# Patient Record
Sex: FEMALE | Race: WHITE | Employment: FULL TIME | URBAN - NONMETROPOLITAN AREA
[De-identification: names, ages, dates, MRNs, and addresses within clinical notes are randomized per-mention and may not be internally consistent; named-entity substitution may affect disease eponyms.]

---

## 2017-08-18 ENCOUNTER — APPOINTMENT (OUTPATIENT)
Dept: GENERAL RADIOLOGY | Age: 37
End: 2017-08-18
Payer: COMMERCIAL

## 2017-08-18 ENCOUNTER — HOSPITAL ENCOUNTER (EMERGENCY)
Age: 37
Discharge: HOME | End: 2017-08-18
Attending: EMERGENCY MEDICINE
Payer: COMMERCIAL

## 2017-08-18 VITALS
WEIGHT: 275 LBS | DIASTOLIC BLOOD PRESSURE: 100 MMHG | HEART RATE: 72 BPM | BODY MASS INDEX: 38.5 KG/M2 | HEIGHT: 71 IN | TEMPERATURE: 98.4 F | RESPIRATION RATE: 18 BRPM | OXYGEN SATURATION: 98 % | SYSTOLIC BLOOD PRESSURE: 160 MMHG

## 2017-08-18 DIAGNOSIS — S60.10XA SUBUNGUAL HEMATOMA OF DIGIT OF HAND, INITIAL ENCOUNTER: Primary | ICD-10-CM

## 2017-08-18 PROCEDURE — 11740 EVACUATION SUBUNGUAL HMTMA: CPT

## 2017-08-18 PROCEDURE — 99283 EMERGENCY DEPT VISIT LOW MDM: CPT

## 2017-08-18 PROCEDURE — 73140 X-RAY EXAM OF FINGER(S): CPT

## 2017-08-18 ASSESSMENT — PAIN DESCRIPTION - DESCRIPTORS: DESCRIPTORS: THROBBING

## 2017-08-18 ASSESSMENT — PAIN SCALES - GENERAL: PAINLEVEL_OUTOF10: 9

## 2017-08-18 ASSESSMENT — PAIN DESCRIPTION - ORIENTATION: ORIENTATION: RIGHT

## 2017-08-18 ASSESSMENT — PAIN DESCRIPTION - LOCATION: LOCATION: FINGER (COMMENT WHICH ONE)

## 2017-08-18 ASSESSMENT — PAIN DESCRIPTION - PAIN TYPE: TYPE: ACUTE PAIN

## 2017-08-18 ASSESSMENT — PAIN DESCRIPTION - ONSET: ONSET: SUDDEN

## 2018-01-30 ENCOUNTER — APPOINTMENT (OUTPATIENT)
Dept: GENERAL RADIOLOGY | Age: 38
End: 2018-01-30
Payer: COMMERCIAL

## 2018-01-30 ENCOUNTER — HOSPITAL ENCOUNTER (EMERGENCY)
Age: 38
Discharge: HOME | End: 2018-01-30
Attending: EMERGENCY MEDICINE
Payer: COMMERCIAL

## 2018-01-30 VITALS
HEIGHT: 71 IN | HEART RATE: 84 BPM | SYSTOLIC BLOOD PRESSURE: 172 MMHG | DIASTOLIC BLOOD PRESSURE: 91 MMHG | BODY MASS INDEX: 38.5 KG/M2 | RESPIRATION RATE: 20 BRPM | OXYGEN SATURATION: 100 % | TEMPERATURE: 98.1 F | WEIGHT: 275 LBS

## 2018-01-30 DIAGNOSIS — J10.1 INFLUENZA B: Primary | ICD-10-CM

## 2018-01-30 LAB
ALBUMIN SERPL-MCNC: 4 G/DL (ref 3.5–5.1)
ALP BLD-CCNC: 80 U/L (ref 38–126)
ALT SERPL-CCNC: 24 U/L (ref 11–66)
ANION GAP SERPL CALCULATED.3IONS-SCNC: 18 MEQ/L (ref 8–16)
AST SERPL-CCNC: 26 U/L (ref 5–40)
BASOPHILS # BLD: 0.3 %
BASOPHILS ABSOLUTE: 0 THOU/MM3 (ref 0–0.1)
BILIRUB SERPL-MCNC: 0.4 MG/DL (ref 0.3–1.2)
BILIRUBIN DIRECT: < 0.2 MG/DL (ref 0–0.3)
BUN BLDV-MCNC: 14 MG/DL (ref 7–22)
CALCIUM SERPL-MCNC: 9.3 MG/DL (ref 8.5–10.5)
CHLORIDE BLD-SCNC: 92 MEQ/L (ref 98–111)
CO2: 24 MEQ/L (ref 23–33)
CREAT SERPL-MCNC: 0.8 MG/DL (ref 0.4–1.2)
D-DIMER QUANTITATIVE: 458 NG/ML FEU (ref 0–500)
EKG ATRIAL RATE: 84 BPM
EKG P AXIS: 49 DEGREES
EKG P-R INTERVAL: 160 MS
EKG Q-T INTERVAL: 358 MS
EKG QRS DURATION: 82 MS
EKG QTC CALCULATION (BAZETT): 423 MS
EKG R AXIS: -10 DEGREES
EKG T AXIS: 38 DEGREES
EKG VENTRICULAR RATE: 84 BPM
EOSINOPHIL # BLD: 0.7 %
EOSINOPHILS ABSOLUTE: 0 THOU/MM3 (ref 0–0.4)
FLU A ANTIGEN: NEGATIVE
FLU B ANTIGEN: POSITIVE
GFR SERPL CREATININE-BSD FRML MDRD: 80 ML/MIN/1.73M2
GLUCOSE BLD-MCNC: 101 MG/DL (ref 70–108)
HCT VFR BLD CALC: 42.9 % (ref 37–47)
HEMOGLOBIN: 13.9 GM/DL (ref 12–16)
HYPOCHROMIA: ABNORMAL
LIPASE: 41.2 U/L (ref 5.6–51.3)
LYMPHOCYTES # BLD: 16.9 %
LYMPHOCYTES ABSOLUTE: 0.8 THOU/MM3 (ref 1–4.8)
MAGNESIUM: 2 MG/DL (ref 1.6–2.4)
MCH RBC QN AUTO: 26.6 PG (ref 27–31)
MCHC RBC AUTO-ENTMCNC: 32.3 GM/DL (ref 33–37)
MCV RBC AUTO: 82.4 FL (ref 81–99)
MONOCYTES # BLD: 9.6 %
MONOCYTES ABSOLUTE: 0.4 THOU/MM3 (ref 0.4–1.3)
NUCLEATED RED BLOOD CELLS: 0 /100 WBC
OSMOLALITY CALCULATION: 268.9 MOSMOL/KG (ref 275–300)
PDW BLD-RTO: 13.5 % (ref 11.5–14.5)
PLATELET # BLD: 135 THOU/MM3 (ref 130–400)
PMV BLD AUTO: 10.9 MCM (ref 7.4–10.4)
POTASSIUM SERPL-SCNC: 3.8 MEQ/L (ref 3.5–5.2)
RBC # BLD: 5.21 MILL/MM3 (ref 4.2–5.4)
SEG NEUTROPHILS: 72.5 %
SEGMENTED NEUTROPHILS ABSOLUTE COUNT: 3.3 THOU/MM3 (ref 1.8–7.7)
SODIUM BLD-SCNC: 134 MEQ/L (ref 135–145)
TOTAL PROTEIN: 7.3 G/DL (ref 6.1–8)
TROPONIN T: < 0.01 NG/ML
WBC # BLD: 4.5 THOU/MM3 (ref 4.8–10.8)

## 2018-01-30 PROCEDURE — 93000 ELECTROCARDIOGRAM COMPLETE: CPT | Performed by: EMERGENCY MEDICINE

## 2018-01-30 PROCEDURE — 82248 BILIRUBIN DIRECT: CPT

## 2018-01-30 PROCEDURE — 87804 INFLUENZA ASSAY W/OPTIC: CPT

## 2018-01-30 PROCEDURE — 36415 COLL VENOUS BLD VENIPUNCTURE: CPT

## 2018-01-30 PROCEDURE — 2580000003 HC RX 258: Performed by: EMERGENCY MEDICINE

## 2018-01-30 PROCEDURE — 99284 EMERGENCY DEPT VISIT MOD MDM: CPT

## 2018-01-30 PROCEDURE — 6370000000 HC RX 637 (ALT 250 FOR IP): Performed by: EMERGENCY MEDICINE

## 2018-01-30 PROCEDURE — 80053 COMPREHEN METABOLIC PANEL: CPT

## 2018-01-30 PROCEDURE — 83690 ASSAY OF LIPASE: CPT

## 2018-01-30 PROCEDURE — 85025 COMPLETE CBC W/AUTO DIFF WBC: CPT

## 2018-01-30 PROCEDURE — 83735 ASSAY OF MAGNESIUM: CPT

## 2018-01-30 PROCEDURE — 85379 FIBRIN DEGRADATION QUANT: CPT

## 2018-01-30 PROCEDURE — 71046 X-RAY EXAM CHEST 2 VIEWS: CPT

## 2018-01-30 PROCEDURE — 93005 ELECTROCARDIOGRAM TRACING: CPT

## 2018-01-30 PROCEDURE — 84484 ASSAY OF TROPONIN QUANT: CPT

## 2018-01-30 RX ORDER — GUAIFENESIN/DEXTROMETHORPHAN 100-10MG/5
5 SYRUP ORAL 3 TIMES DAILY PRN
Qty: 120 ML | Refills: 0 | Status: SHIPPED | OUTPATIENT
Start: 2018-01-30 | End: 2018-02-09

## 2018-01-30 RX ORDER — 0.9 % SODIUM CHLORIDE 0.9 %
1000 INTRAVENOUS SOLUTION INTRAVENOUS ONCE
Status: COMPLETED | OUTPATIENT
Start: 2018-01-30 | End: 2018-01-30

## 2018-01-30 RX ORDER — IBUPROFEN 400 MG/1
400 TABLET ORAL EVERY 6 HOURS PRN
COMMUNITY
End: 2018-03-05 | Stop reason: SDUPTHER

## 2018-01-30 RX ORDER — OSELTAMIVIR PHOSPHATE 75 MG/1
75 CAPSULE ORAL ONCE
Status: COMPLETED | OUTPATIENT
Start: 2018-01-30 | End: 2018-01-30

## 2018-01-30 RX ORDER — OSELTAMIVIR PHOSPHATE 75 MG/1
75 CAPSULE ORAL 2 TIMES DAILY
Qty: 10 CAPSULE | Refills: 0 | Status: SHIPPED | OUTPATIENT
Start: 2018-01-30 | End: 2018-02-04

## 2018-01-30 RX ADMIN — SODIUM CHLORIDE 1000 ML: 9 INJECTION, SOLUTION INTRAVENOUS at 03:17

## 2018-01-30 RX ADMIN — OSELTAMIVIR PHOSPHATE 75 MG: 75 CAPSULE ORAL at 04:56

## 2018-01-30 ASSESSMENT — ENCOUNTER SYMPTOMS
VOICE CHANGE: 0
PHOTOPHOBIA: 0
CHOKING: 0
WHEEZING: 0
ABDOMINAL PAIN: 0
NAUSEA: 0
VOMITING: 0
SINUS PRESSURE: 0
SORE THROAT: 1
TROUBLE SWALLOWING: 0
EYE PAIN: 0
CONSTIPATION: 0
BLOOD IN STOOL: 0
SHORTNESS OF BREATH: 0
EYE ITCHING: 0
BACK PAIN: 0
EYE REDNESS: 0
COUGH: 1
ABDOMINAL DISTENTION: 0
DIARRHEA: 1
CHEST TIGHTNESS: 0
RHINORRHEA: 0
EYE DISCHARGE: 0

## 2018-01-30 ASSESSMENT — PAIN DESCRIPTION - FREQUENCY: FREQUENCY: INTERMITTENT

## 2018-01-30 ASSESSMENT — PAIN SCALES - GENERAL
PAINLEVEL_OUTOF10: 8
PAINLEVEL_OUTOF10: 8

## 2018-01-30 ASSESSMENT — PAIN DESCRIPTION - ONSET: ONSET: GRADUAL

## 2018-01-30 ASSESSMENT — PAIN DESCRIPTION - PROGRESSION: CLINICAL_PROGRESSION: GRADUALLY WORSENING

## 2018-01-30 ASSESSMENT — PAIN DESCRIPTION - LOCATION: LOCATION: THROAT;CHEST

## 2018-01-30 ASSESSMENT — PAIN DESCRIPTION - PAIN TYPE
TYPE: ACUTE PAIN
TYPE: ACUTE PAIN

## 2018-01-30 ASSESSMENT — PAIN DESCRIPTION - DESCRIPTORS: DESCRIPTORS: SHARP

## 2018-01-30 NOTE — ED PROVIDER NOTES
[facility]  eMERGENCY dEPARTMENT eNCOUnter          CHIEF COMPLAINT       Chief Complaint   Patient presents with    Chest Pain    Dizziness       Nurses Notes reviewed and I agree except as noted in the HPI. HISTORY OF PRESENT ILLNESS    Onesimo Estrada is a 42 y.o. female who presents to the Emergency Department for the evaluation of syncope last night. The patient also complains of dizziness, chest pain (only with cough), cough (unproductive), sore throat, fever (102 degrees 3 days ago), urinary frequency, and diarrhea. The patient denies shortness of breath, abdominal pain, or headache. She reports 3 days ago she developed a fever, then yesterday she notices a sore throat and unproductive cough. Tonight, she reports she was just getting out of the shower when she felt dizzy, had chest pain, and passed out. The patient reports that her boyfriend is also sick with similar symptoms. He recently had an EGD exam. She denies any significant past medical history. The patient denies any other complaints or concerns at this time. All questions were addressed. Primary Care Provider: Meredith Branch DO    The HPI was provided by the patient. REVIEW OF SYSTEMS     Review of Systems   Constitutional: Positive for fever (102 degrees 2 days ago). Negative for activity change, appetite change, diaphoresis, fatigue and unexpected weight change. HENT: Positive for sore throat. Negative for congestion, ear discharge, ear pain, hearing loss, rhinorrhea, sinus pressure, trouble swallowing and voice change. Eyes: Negative for photophobia, pain, discharge, redness and itching. Respiratory: Positive for cough (unproductive). Negative for choking, chest tightness, shortness of breath and wheezing. Cardiovascular: Positive for chest pain (only with cough). Negative for palpitations and leg swelling. Gastrointestinal: Positive for diarrhea.  Negative for abdominal distention, abdominal pain, other components within normal limits   ANION GAP - Abnormal; Notable for the following: Anion Gap 18.0 (*)     All other components within normal limits   GLOMERULAR FILTRATION RATE, ESTIMATED - Abnormal; Notable for the following:     Est, Glom Filt Rate 80 (*)     All other components within normal limits   OSMOLALITY - Abnormal; Notable for the following:     Osmolality Calc 268.9 (*)     All other components within normal limits   HEPATIC FUNCTION PANEL   LIPASE   TROPONIN   MAGNESIUM   D-DIMER, QUANTITATIVE       EMERGENCY DEPARTMENT COURSE:   Vitals:    Vitals:    01/30/18 0251 01/30/18 0351 01/30/18 0455   BP: (!) 122/94  (!) 172/91   Pulse: 93  84   Resp: 20 20 20   Temp: 98.1 °F (36.7 °C)     TempSrc: Oral     SpO2: 100% 100% 100%   Weight: 275 lb (124.7 kg)     Height: 5' 11\" (1.803 m)       2:58 AM: The patient was seen and evaluated. Appropriate labs were ordered and reviewed. At bedside, the patient was treated with a sodium chloride bolus. The patient is positive for influenza. A negative troponin and d-dimer were reassuring for rule out of ACS and PE. Her chest x-ray showed no acute findings, further ruling out ACS and Pe. Based on these findings,as well as their stable and non-emergent condition, I will discharge the patient. Patient has what appears to be influenza B. Patient has been given a prescription for Tamiflu and cough medication she is instructed to take it as prescribed. She is instructed to take Motrin and Tylenol for any fevers or pain. She is instructed to follow-up with her primary care physician and to do so within the next 1-2 days and to return to the emergency room immediately for any new or worsening complaints. CRITICAL CARE:   None    CONSULTS:  None    PROCEDURES:  None    FINAL IMPRESSION      1.  Influenza B          DISPOSITION/PLAN   Discharge    PATIENT REFERRED TO:  Selena Mann [address]  [address] [address] [phone]    Call in 2 days  As needed      DISCHARGE MEDICATIONS:  New Prescriptions    GUAIFENESIN-DEXTROMETHORPHAN (ROBITUSSIN DM) 100-10 MG/5ML SYRUP    Take 5 mLs by mouth 3 times daily as needed for Cough    OSELTAMIVIR (TAMIFLU) 75 MG CAPSULE    Take 1 capsule by mouth 2 times daily for 5 days       (Please note that portions of this note were completed with a voice recognition program.  Efforts were made to edit the dictations but occasionally words are mis-transcribed.)    Scribe: Kimberli Cardoso [facility] 1/30/18 2:58 AM Scribing for and in the presence of Carrie Hernandez DO. Scribe: Kimberli Cardoso [facility] 1/30/18 2:58 AM    Provider:  I personally performed the services described in the documentation, reviewed and edited the documentation which was dictated to the scribe in my presence, and it accurately records my words and actions.     Carrie Hernandez DO 1/30/18 5:00 AM        Carrie Hernandez DO  01/30/18 [id number]

## 2018-03-01 ENCOUNTER — OFFICE VISIT (OUTPATIENT)
Dept: FAMILY MEDICINE CLINIC | Age: 38
End: 2018-03-01
Payer: COMMERCIAL

## 2018-03-01 VITALS
HEIGHT: 69 IN | HEART RATE: 80 BPM | SYSTOLIC BLOOD PRESSURE: 124 MMHG | WEIGHT: 278 LBS | RESPIRATION RATE: 14 BRPM | DIASTOLIC BLOOD PRESSURE: 87 MMHG | TEMPERATURE: 98.4 F | BODY MASS INDEX: 41.18 KG/M2

## 2018-03-01 DIAGNOSIS — R22.1 NECK MASS: Primary | ICD-10-CM

## 2018-03-01 PROCEDURE — 99213 OFFICE O/P EST LOW 20 MIN: CPT | Performed by: FAMILY MEDICINE

## 2018-03-01 ASSESSMENT — PATIENT HEALTH QUESTIONNAIRE - PHQ9
SUM OF ALL RESPONSES TO PHQ QUESTIONS 1-9: 0
SUM OF ALL RESPONSES TO PHQ9 QUESTIONS 1 & 2: 0
1. LITTLE INTEREST OR PLEASURE IN DOING THINGS: 0
2. FEELING DOWN, DEPRESSED OR HOPELESS: 0

## 2018-03-02 ENCOUNTER — TELEPHONE (OUTPATIENT)
Dept: FAMILY MEDICINE CLINIC | Age: 38
End: 2018-03-02

## 2018-03-05 ENCOUNTER — HOSPITAL ENCOUNTER (EMERGENCY)
Age: 38
Discharge: HOME | End: 2018-03-05
Attending: EMERGENCY MEDICINE
Payer: COMMERCIAL

## 2018-03-05 ENCOUNTER — APPOINTMENT (OUTPATIENT)
Dept: CT IMAGING | Age: 38
End: 2018-03-05
Payer: COMMERCIAL

## 2018-03-05 VITALS
SYSTOLIC BLOOD PRESSURE: 162 MMHG | OXYGEN SATURATION: 100 % | BODY MASS INDEX: 39.87 KG/M2 | WEIGHT: 270 LBS | HEART RATE: 75 BPM | RESPIRATION RATE: 20 BRPM | TEMPERATURE: 97.8 F | DIASTOLIC BLOOD PRESSURE: 95 MMHG

## 2018-03-05 DIAGNOSIS — I88.0 MESENTERIC ADENITIS: ICD-10-CM

## 2018-03-05 DIAGNOSIS — R10.31 RLQ ABDOMINAL PAIN: Primary | ICD-10-CM

## 2018-03-05 LAB
ALBUMIN SERPL-MCNC: 3.9 G/DL (ref 3.5–5.1)
ALP BLD-CCNC: 86 U/L (ref 38–126)
ALT SERPL-CCNC: 16 U/L (ref 11–66)
ANION GAP SERPL CALCULATED.3IONS-SCNC: 15 MEQ/L (ref 8–16)
AST SERPL-CCNC: 14 U/L (ref 5–40)
BASOPHILS # BLD: 0.6 %
BASOPHILS ABSOLUTE: 0.1 THOU/MM3 (ref 0–0.1)
BILIRUB SERPL-MCNC: 0.2 MG/DL (ref 0.3–1.2)
BILIRUBIN DIRECT: < 0.2 MG/DL (ref 0–0.3)
BILIRUBIN URINE: NEGATIVE
BLOOD, URINE: NEGATIVE
BUN BLDV-MCNC: 19 MG/DL (ref 7–22)
C-REACTIVE PROTEIN: 1.31 MG/DL (ref 0–1)
CALCIUM SERPL-MCNC: 9.6 MG/DL (ref 8.5–10.5)
CHARACTER, URINE: CLEAR
CHLORIDE BLD-SCNC: 99 MEQ/L (ref 98–111)
CO2: 26 MEQ/L (ref 23–33)
COLOR: YELLOW
CREAT SERPL-MCNC: 0.7 MG/DL (ref 0.4–1.2)
EOSINOPHIL # BLD: 1.8 %
EOSINOPHILS ABSOLUTE: 0.2 THOU/MM3 (ref 0–0.4)
GFR SERPL CREATININE-BSD FRML MDRD: > 90 ML/MIN/1.73M2
GLUCOSE BLD-MCNC: 113 MG/DL (ref 70–108)
GLUCOSE URINE: NEGATIVE MG/DL
HCT VFR BLD CALC: 38.9 % (ref 37–47)
HEMOGLOBIN: 13.2 GM/DL (ref 12–16)
KETONES, URINE: NEGATIVE
LACTIC ACID: 1.8 MMOL/L (ref 0.5–2.2)
LEUKOCYTE ESTERASE, URINE: NEGATIVE
LIPASE: 51.2 U/L (ref 5.6–51.3)
LYMPHOCYTES # BLD: 26.6 %
LYMPHOCYTES ABSOLUTE: 3.2 THOU/MM3 (ref 1–4.8)
MAGNESIUM: 2.1 MG/DL (ref 1.6–2.4)
MCH RBC QN AUTO: 28.1 PG (ref 27–31)
MCHC RBC AUTO-ENTMCNC: 33.9 GM/DL (ref 33–37)
MCV RBC AUTO: 82.8 FL (ref 81–99)
MONOCYTES # BLD: 5.8 %
MONOCYTES ABSOLUTE: 0.7 THOU/MM3 (ref 0.4–1.3)
NITRITE, URINE: NEGATIVE
NUCLEATED RED BLOOD CELLS: 0 /100 WBC
OSMOLALITY CALCULATION: 282.5 MOSMOL/KG (ref 275–300)
PDW BLD-RTO: 13.6 % (ref 11.5–14.5)
PH UA: 5.5
PHOSPHORUS: 4.4 MG/DL (ref 2.4–4.7)
PLATELET # BLD: 186 THOU/MM3 (ref 130–400)
PMV BLD AUTO: 10.2 FL (ref 7.4–10.4)
POTASSIUM SERPL-SCNC: 4.4 MEQ/L (ref 3.5–5.2)
PROTEIN UA: NEGATIVE
RBC # BLD: 4.69 MILL/MM3 (ref 4.2–5.4)
SEG NEUTROPHILS: 65.2 %
SEGMENTED NEUTROPHILS ABSOLUTE COUNT: 7.9 THOU/MM3 (ref 1.8–7.7)
SODIUM BLD-SCNC: 140 MEQ/L (ref 135–145)
SPECIFIC GRAVITY, URINE: 1.02 (ref 1–1.03)
TOTAL PROTEIN: 6.9 G/DL (ref 6.1–8)
UROBILINOGEN, URINE: 0.2 EU/DL
WBC # BLD: 12.1 THOU/MM3 (ref 4.8–10.8)

## 2018-03-05 PROCEDURE — 96376 TX/PRO/DX INJ SAME DRUG ADON: CPT

## 2018-03-05 PROCEDURE — 85025 COMPLETE CBC W/AUTO DIFF WBC: CPT

## 2018-03-05 PROCEDURE — 83690 ASSAY OF LIPASE: CPT

## 2018-03-05 PROCEDURE — 6360000004 HC RX CONTRAST MEDICATION: Performed by: PHYSICIAN ASSISTANT

## 2018-03-05 PROCEDURE — 86140 C-REACTIVE PROTEIN: CPT

## 2018-03-05 PROCEDURE — 99284 EMERGENCY DEPT VISIT MOD MDM: CPT

## 2018-03-05 PROCEDURE — 96374 THER/PROPH/DIAG INJ IV PUSH: CPT

## 2018-03-05 PROCEDURE — 83735 ASSAY OF MAGNESIUM: CPT

## 2018-03-05 PROCEDURE — 6360000002 HC RX W HCPCS: Performed by: PHYSICIAN ASSISTANT

## 2018-03-05 PROCEDURE — 36415 COLL VENOUS BLD VENIPUNCTURE: CPT

## 2018-03-05 PROCEDURE — 80053 COMPREHEN METABOLIC PANEL: CPT

## 2018-03-05 PROCEDURE — 96375 TX/PRO/DX INJ NEW DRUG ADDON: CPT

## 2018-03-05 PROCEDURE — 81003 URINALYSIS AUTO W/O SCOPE: CPT

## 2018-03-05 PROCEDURE — 74177 CT ABD & PELVIS W/CONTRAST: CPT

## 2018-03-05 PROCEDURE — 84100 ASSAY OF PHOSPHORUS: CPT

## 2018-03-05 PROCEDURE — 82248 BILIRUBIN DIRECT: CPT

## 2018-03-05 PROCEDURE — 83605 ASSAY OF LACTIC ACID: CPT

## 2018-03-05 RX ORDER — IBUPROFEN 600 MG/1
600 TABLET ORAL EVERY 6 HOURS PRN
Qty: 30 TABLET | Refills: 0 | Status: SHIPPED | OUTPATIENT
Start: 2018-03-05 | End: 2019-10-28

## 2018-03-05 RX ORDER — MORPHINE SULFATE 2 MG/ML
2 INJECTION, SOLUTION INTRAMUSCULAR; INTRAVENOUS ONCE
Status: COMPLETED | OUTPATIENT
Start: 2018-03-05 | End: 2018-03-05

## 2018-03-05 RX ORDER — ONDANSETRON 4 MG/1
4 TABLET, ORALLY DISINTEGRATING ORAL EVERY 8 HOURS PRN
Qty: 20 TABLET | Refills: 0 | Status: SHIPPED | OUTPATIENT
Start: 2018-03-05 | End: 2018-03-29

## 2018-03-05 RX ORDER — ONDANSETRON 2 MG/ML
4 INJECTION INTRAMUSCULAR; INTRAVENOUS ONCE
Status: COMPLETED | OUTPATIENT
Start: 2018-03-05 | End: 2018-03-05

## 2018-03-05 RX ORDER — DICYCLOMINE HYDROCHLORIDE 10 MG/1
10 CAPSULE ORAL EVERY 6 HOURS PRN
Qty: 20 CAPSULE | Refills: 0 | Status: SHIPPED | OUTPATIENT
Start: 2018-03-05 | End: 2018-03-29

## 2018-03-05 RX ADMIN — MORPHINE SULFATE 2 MG: 2 INJECTION, SOLUTION INTRAMUSCULAR; INTRAVENOUS at 21:08

## 2018-03-05 RX ADMIN — ONDANSETRON 4 MG: 2 INJECTION INTRAMUSCULAR; INTRAVENOUS at 21:08

## 2018-03-05 RX ADMIN — MORPHINE SULFATE 2 MG: 2 INJECTION, SOLUTION INTRAMUSCULAR; INTRAVENOUS at 23:15

## 2018-03-05 RX ADMIN — IOPAMIDOL 80 ML: 755 INJECTION, SOLUTION INTRAVENOUS at 22:51

## 2018-03-05 ASSESSMENT — PAIN DESCRIPTION - LOCATION: LOCATION: ABDOMEN

## 2018-03-05 ASSESSMENT — ENCOUNTER SYMPTOMS
COLOR CHANGE: 0
BACK PAIN: 0
EYE REDNESS: 0
VOMITING: 0
RHINORRHEA: 0
SORE THROAT: 0
ABDOMINAL PAIN: 1
WHEEZING: 0
DIARRHEA: 0
NAUSEA: 1
SHORTNESS OF BREATH: 0
CONSTIPATION: 0
EYE DISCHARGE: 0

## 2018-03-05 ASSESSMENT — PAIN DESCRIPTION - FREQUENCY: FREQUENCY: CONTINUOUS

## 2018-03-05 ASSESSMENT — PAIN SCALES - GENERAL
PAINLEVEL_OUTOF10: 8
PAINLEVEL_OUTOF10: 7
PAINLEVEL_OUTOF10: 6

## 2018-03-05 ASSESSMENT — PAIN DESCRIPTION - PAIN TYPE: TYPE: ACUTE PAIN

## 2018-03-05 ASSESSMENT — PAIN DESCRIPTION - ORIENTATION: ORIENTATION: RIGHT;LOWER

## 2018-03-05 ASSESSMENT — PAIN DESCRIPTION - ONSET: ONSET: ON-GOING

## 2018-03-05 ASSESSMENT — PAIN DESCRIPTION - DESCRIPTORS: DESCRIPTORS: SHARP

## 2018-03-06 ENCOUNTER — OFFICE VISIT (OUTPATIENT)
Dept: SURGERY | Age: 38
End: 2018-03-06
Payer: COMMERCIAL

## 2018-03-06 VITALS
BODY MASS INDEX: 39.2 KG/M2 | OXYGEN SATURATION: 97 % | WEIGHT: 280 LBS | HEIGHT: 71 IN | HEART RATE: 90 BPM | DIASTOLIC BLOOD PRESSURE: 84 MMHG | TEMPERATURE: 97.7 F | RESPIRATION RATE: 18 BRPM | SYSTOLIC BLOOD PRESSURE: 120 MMHG

## 2018-03-06 DIAGNOSIS — R10.31 RIGHT LOWER QUADRANT ABDOMINAL PAIN: Primary | ICD-10-CM

## 2018-03-06 PROCEDURE — 99204 OFFICE O/P NEW MOD 45 MIN: CPT | Performed by: SURGERY

## 2018-03-06 ASSESSMENT — ENCOUNTER SYMPTOMS
COUGH: 0
BLOOD IN STOOL: 0

## 2018-03-06 NOTE — ED PROVIDER NOTES
patient's vital signs to be within acceptable range, and she was afebrile. Patient was hypertensive during her stay, but this was believed to be secondary to pain. She will need to follow up with her PCP for further BP monitoring. On exam, I appreciated right lower quadrant abdominal tenderness in the McBurney's point region without guarding, rigidity, rebound tenderness. Mildly positive obturator, iliopsoas, and Rovsing signs. Radiologic studies within the department revealed possible mild right lower quadrant mesenteric adenitis but a normal appendix. Also noted was a mildly dilated stool filled distal terminal ileum, likely due to bowel stasis. Also noted were bilateral adrenal lesions consistent with probable adenomas, for which the radiologist recommends follow-up CT in 6-12 months or adrenal MRI. These findings were discussed with the patient. Laboratory work revealed a white blood cell count of 12.1, CRP of 1.31, and lactic acid of 1.8. Within the department, the patient was treated with Morphine and Zofran. I observed the patient's condition to improve during the duration of the stay. I explained my proposed course of treatment to the patient, who was amenable to my treatment and discharge decisions. She was discharged home in stable condition with prescriptions for Ibuprofen, Bentyl, and Zofran, and the patient will return to the ED if the symptoms become more severe in nature or otherwise change. The patient agreed to call Dr. Michelle Goode office tomorrow morning to schedule a follow-up appointment tomorrow. I estimate there is LOW risk for ACUTE APPENDICITIS, BOWEL OBSTRUCTION, CHOLECYSTITIS, DIVERTICULITIS, INCARCERATED HERNIA, PANCREATITIS, or PERFORATED BOWEL or ULCER, thus I consider the discharge disposition reasonable. Also, there is no evidence of peritonitis, sepsis, or toxicity.  The patient and I have discussed the diagnosis and risks, and we agree with discharging home to follow-up with mis-transcribed.)    Scribe:  Julissa Wilburn 3/5/18 8:53 PM Scribing for and in the presence of Benji Joshua PA-C. Signed by: Juan Morales, 3/5/18 12:06 AM    Provider:  I personally performed the services described in the documentation, reviewed and edited the documentation which was dictated to the scribe in my presence, and it accurately records my words and actions.     Benji Joshua PA-C 03/06/18 12:06 AM    DIANDRA Gaviria PA-C  03/06/18 0005       Benji Joshua PA-C  03/06/18 [id number]

## 2018-03-07 ENCOUNTER — OFFICE VISIT (OUTPATIENT)
Dept: FAMILY MEDICINE CLINIC | Age: 38
End: 2018-03-07
Payer: COMMERCIAL

## 2018-03-07 VITALS
SYSTOLIC BLOOD PRESSURE: 126 MMHG | BODY MASS INDEX: 39.48 KG/M2 | RESPIRATION RATE: 12 BRPM | DIASTOLIC BLOOD PRESSURE: 74 MMHG | HEART RATE: 64 BPM | WEIGHT: 282 LBS | HEIGHT: 71 IN | TEMPERATURE: 97.9 F

## 2018-03-07 DIAGNOSIS — D35.00 ADRENAL ADENOMA, UNSPECIFIED LATERALITY: ICD-10-CM

## 2018-03-07 DIAGNOSIS — R10.31 RLQ ABDOMINAL PAIN: Primary | ICD-10-CM

## 2018-03-07 PROCEDURE — 99213 OFFICE O/P EST LOW 20 MIN: CPT | Performed by: FAMILY MEDICINE

## 2018-03-07 NOTE — PROGRESS NOTES
Visit Information    Have you changed or started any medications since your last visit including any over-the-counter medicines, vitamins, or herbal medicines? yes - see med list    Are you having any side effects from any of your medications? -  no  Have you stopped taking any of your medications? Is so, why? -  yes - see med list     Have you seen any other physician or provider since your last visit? Yes - Records Obtained  Have you had any other diagnostic tests since your last visit? Yes - Records Obtained  Have you been seen in the emergency room and/or had an admission to a hospital since we last saw you? Yes - Records Obtained  Have you had your routine dental cleaning in the past 6 months? no    Have you activated your [employer] account? If not, what are your barriers?  No: pt declined      Patient Care Team:  Cassidy Nguyen DO as PCP - General (Family Medicine)    Medical History Review  Past Medical, Family, and Social History reviewed and does contribute to the patient presenting condition    Health Maintenance   Topic Date Due    HIV screen  04/12/1993    DTaP/Tdap/Td vaccine (1 - Tdap) 04/12/1997    Flu vaccine (1) 09/01/2017

## 2018-03-07 NOTE — PROGRESS NOTES
Chief Complaint   Patient presents with    Follow-up     RLQ pain, adrenal adenomas       History obtained from the patient. SUBJECTIVE:  Cole White is a 42 y.o.  that presents today for     RLQ pain: seen in ER over the weekend. Dx with mesenteric adenitis and sent home. Had f/u with gen surgeon yesterday, Dr. Lolita Carolina, recommending monitor sxs and if no better by tomorrow, he will take her back to the OR. Pain is the same today. Managed with motrin but not better. No fevers. Eating ok. Bowels moving well      Bilat adrenal adenomas: noted incidentally on CT. No flank pain. Age/Gender Health Maintenance    Lipid - age 37  DM Screen - age 37  Colon Cancer Screening - age 46  Lung Cancer Screening (Age 52 to [de-identified] with 30 pack year hx, current smoker or quit within past 15 years) - never smoker. Tetanus - UTD 2007  Influenza Vaccine - Candidate FALL 2018  Pneumonia Vaccine - age 70  Zostavax - age 59     Breast Cancer Screening - age 34  Cervical Cancer Screening - hyst for benign reasons  Osteoporosis Screening - age 59    Falls screening - n/a      Current Outpatient Prescriptions   Medication Sig Dispense Refill    ibuprofen (ADVIL;MOTRIN) 600 MG tablet Take 1 tablet by mouth every 6 hours as needed for Pain 30 tablet 0    dicyclomine (BENTYL) 10 MG capsule Take 1 capsule by mouth every 6 hours as needed (cramps) 20 capsule 0    ondansetron (ZOFRAN ODT) 4 MG disintegrating tablet Take 1 tablet by mouth every 8 hours as needed for Nausea 20 tablet 0    acetaminophen (TYLENOL) 500 MG tablet Take 1,000 mg by mouth every 6 hours as needed for Pain       No current facility-administered medications for this visit. No orders of the defined types were placed in this encounter. All medications reviewed and reconciled, including OTC and herbal medications. Updated list given to patient.        Patient Active Problem List   Diagnosis    Patellofemoral stress syndrome of right knee    return the ER. 2. Adrenal adenoma, bilateral    Will repeat CT in 6 months, in call-back cue. DISPOSITION    Return in 3 weeks (on 3/29/2018) for f/u neck swelling, sooner as needed. Nancy Mead released without restrictions. Future Appointments  Date Time Provider Junior Kim   3/9/2018 2:00 PM STR ULTRASOUND RM 2 STRZ US STR Radiolog   3/29/2018 3:40 PM Chiara Richards DO [address]    Patient given educational materials on: See Attached    Barriers to learning and self management: none    Discussed use, benefit, and side effects of prescribed medications. Barriers to medication compliance addressed. All patient questions answered. Pt voiced understanding.        Electronically signed by Chiara Richards DO on 3/7/2018 at 9:06 AM

## 2018-03-08 ENCOUNTER — TELEPHONE (OUTPATIENT)
Dept: SURGERY | Age: 38
End: 2018-03-08

## 2018-03-09 ENCOUNTER — HOSPITAL ENCOUNTER (OUTPATIENT)
Age: 38
Discharge: HOME | End: 2018-03-10
Attending: SURGERY | Admitting: SURGERY
Payer: COMMERCIAL

## 2018-03-09 ENCOUNTER — ANESTHESIA EVENT (OUTPATIENT)
Dept: OPERATING ROOM | Age: 38
End: 2018-03-09

## 2018-03-09 ENCOUNTER — ANESTHESIA (OUTPATIENT)
Dept: OPERATING ROOM | Age: 38
End: 2018-03-09

## 2018-03-09 VITALS
SYSTOLIC BLOOD PRESSURE: 156 MMHG | DIASTOLIC BLOOD PRESSURE: 93 MMHG | OXYGEN SATURATION: 100 % | TEMPERATURE: 98.6 F | RESPIRATION RATE: 11 BRPM

## 2018-03-09 DIAGNOSIS — Z98.890 S/P LAPAROSCOPY: Primary | ICD-10-CM

## 2018-03-09 PROCEDURE — 2580000003 HC RX 258

## 2018-03-09 PROCEDURE — 3700000001 HC ADD 15 MINUTES (ANESTHESIA): Performed by: SURGERY

## 2018-03-09 PROCEDURE — 7100000001 HC PACU RECOVERY - ADDTL 15 MIN: Performed by: SURGERY

## 2018-03-09 PROCEDURE — 6360000002 HC RX W HCPCS: Performed by: ANESTHESIOLOGY

## 2018-03-09 PROCEDURE — 96360 HYDRATION IV INFUSION INIT: CPT

## 2018-03-09 PROCEDURE — 96365 THER/PROPH/DIAG IV INF INIT: CPT

## 2018-03-09 PROCEDURE — 6360000002 HC RX W HCPCS: Performed by: SURGERY

## 2018-03-09 PROCEDURE — 7100000000 HC PACU RECOVERY - FIRST 15 MIN: Performed by: SURGERY

## 2018-03-09 PROCEDURE — 2700000000 HC OXYGEN THERAPY PER DAY

## 2018-03-09 PROCEDURE — 6370000000 HC RX 637 (ALT 250 FOR IP): Performed by: ANESTHESIOLOGY

## 2018-03-09 PROCEDURE — 2500000003 HC RX 250 WO HCPCS: Performed by: SURGERY

## 2018-03-09 PROCEDURE — 44970 LAPAROSCOPY APPENDECTOMY: CPT | Performed by: SURGERY

## 2018-03-09 PROCEDURE — 6360000002 HC RX W HCPCS: Performed by: NURSE ANESTHETIST, CERTIFIED REGISTERED

## 2018-03-09 PROCEDURE — 2500000003 HC RX 250 WO HCPCS: Performed by: ANESTHESIOLOGY

## 2018-03-09 PROCEDURE — 88304 TISSUE EXAM BY PATHOLOGIST: CPT

## 2018-03-09 PROCEDURE — 3600000014 HC SURGERY LEVEL 4 ADDTL 15MIN: Performed by: SURGERY

## 2018-03-09 PROCEDURE — 96374 THER/PROPH/DIAG INJ IV PUSH: CPT

## 2018-03-09 PROCEDURE — 2500000003 HC RX 250 WO HCPCS: Performed by: NURSE ANESTHETIST, CERTIFIED REGISTERED

## 2018-03-09 PROCEDURE — 96366 THER/PROPH/DIAG IV INF ADDON: CPT

## 2018-03-09 PROCEDURE — 3700000000 HC ANESTHESIA ATTENDED CARE: Performed by: SURGERY

## 2018-03-09 PROCEDURE — 96376 TX/PRO/DX INJ SAME DRUG ADON: CPT

## 2018-03-09 PROCEDURE — 2580000003 HC RX 258: Performed by: SURGERY

## 2018-03-09 PROCEDURE — 3600000004 HC SURGERY LEVEL 4 BASE: Performed by: SURGERY

## 2018-03-09 RX ORDER — SODIUM CHLORIDE 0.9 % (FLUSH) 0.9 %
10 SYRINGE (ML) INJECTION EVERY 12 HOURS SCHEDULED
Status: DISCONTINUED | OUTPATIENT
Start: 2018-03-09 | End: 2018-03-10 | Stop reason: HOSPADM

## 2018-03-09 RX ORDER — LABETALOL HYDROCHLORIDE 5 MG/ML
10 INJECTION, SOLUTION INTRAVENOUS EVERY 10 MIN PRN
Status: DISCONTINUED | OUTPATIENT
Start: 2018-03-09 | End: 2018-03-09 | Stop reason: HOSPADM

## 2018-03-09 RX ORDER — SUCCINYLCHOLINE CHLORIDE 20 MG/ML
INJECTION INTRAMUSCULAR; INTRAVENOUS PRN
Status: DISCONTINUED | OUTPATIENT
Start: 2018-03-09 | End: 2018-03-09 | Stop reason: SDUPTHER

## 2018-03-09 RX ORDER — DICYCLOMINE HYDROCHLORIDE 10 MG/1
10 CAPSULE ORAL EVERY 6 HOURS PRN
Status: DISCONTINUED | OUTPATIENT
Start: 2018-03-09 | End: 2018-03-10 | Stop reason: HOSPADM

## 2018-03-09 RX ORDER — ONDANSETRON 2 MG/ML
4 INJECTION INTRAMUSCULAR; INTRAVENOUS EVERY 6 HOURS PRN
Status: DISCONTINUED | OUTPATIENT
Start: 2018-03-09 | End: 2018-03-10 | Stop reason: HOSPADM

## 2018-03-09 RX ORDER — FENTANYL CITRATE 50 UG/ML
INJECTION, SOLUTION INTRAMUSCULAR; INTRAVENOUS PRN
Status: DISCONTINUED | OUTPATIENT
Start: 2018-03-09 | End: 2018-03-09 | Stop reason: SDUPTHER

## 2018-03-09 RX ORDER — SODIUM CHLORIDE 9 MG/ML
INJECTION, SOLUTION INTRAVENOUS CONTINUOUS
Status: DISCONTINUED | OUTPATIENT
Start: 2018-03-09 | End: 2018-03-10 | Stop reason: HOSPADM

## 2018-03-09 RX ORDER — PROMETHAZINE HYDROCHLORIDE 25 MG/ML
12.5 INJECTION, SOLUTION INTRAMUSCULAR; INTRAVENOUS
Status: DISCONTINUED | OUTPATIENT
Start: 2018-03-09 | End: 2018-03-09 | Stop reason: HOSPADM

## 2018-03-09 RX ORDER — SODIUM CHLORIDE 9 MG/ML
INJECTION, SOLUTION INTRAVENOUS CONTINUOUS
Status: DISCONTINUED | OUTPATIENT
Start: 2018-03-09 | End: 2018-03-09

## 2018-03-09 RX ORDER — LIDOCAINE HYDROCHLORIDE 10 MG/ML
INJECTION, SOLUTION INFILTRATION; PERINEURAL PRN
Status: DISCONTINUED | OUTPATIENT
Start: 2018-03-09 | End: 2018-03-09 | Stop reason: SDUPTHER

## 2018-03-09 RX ORDER — ACETAMINOPHEN 325 MG/1
650 TABLET ORAL EVERY 4 HOURS PRN
Status: DISCONTINUED | OUTPATIENT
Start: 2018-03-09 | End: 2018-03-10 | Stop reason: HOSPADM

## 2018-03-09 RX ORDER — CEFTRIAXONE 1 G/1
INJECTION, POWDER, FOR SOLUTION INTRAMUSCULAR; INTRAVENOUS PRN
Status: DISCONTINUED | OUTPATIENT
Start: 2018-03-09 | End: 2018-03-09 | Stop reason: SDUPTHER

## 2018-03-09 RX ORDER — OXYCODONE HYDROCHLORIDE AND ACETAMINOPHEN 5; 325 MG/1; MG/1
1 TABLET ORAL EVERY 4 HOURS PRN
Status: DISCONTINUED | OUTPATIENT
Start: 2018-03-09 | End: 2018-03-10 | Stop reason: HOSPADM

## 2018-03-09 RX ORDER — KETOROLAC TROMETHAMINE 30 MG/ML
INJECTION, SOLUTION INTRAMUSCULAR; INTRAVENOUS PRN
Status: DISCONTINUED | OUTPATIENT
Start: 2018-03-09 | End: 2018-03-09 | Stop reason: SDUPTHER

## 2018-03-09 RX ORDER — ONDANSETRON 2 MG/ML
INJECTION INTRAMUSCULAR; INTRAVENOUS PRN
Status: DISCONTINUED | OUTPATIENT
Start: 2018-03-09 | End: 2018-03-09 | Stop reason: SDUPTHER

## 2018-03-09 RX ORDER — PROPOFOL 10 MG/ML
INJECTION, EMULSION INTRAVENOUS PRN
Status: DISCONTINUED | OUTPATIENT
Start: 2018-03-09 | End: 2018-03-09 | Stop reason: SDUPTHER

## 2018-03-09 RX ORDER — BUPIVACAINE HYDROCHLORIDE AND EPINEPHRINE 5; 5 MG/ML; UG/ML
INJECTION, SOLUTION EPIDURAL; INTRACAUDAL; PERINEURAL PRN
Status: DISCONTINUED | OUTPATIENT
Start: 2018-03-09 | End: 2018-03-09 | Stop reason: HOSPADM

## 2018-03-09 RX ORDER — MIDAZOLAM HYDROCHLORIDE 1 MG/ML
INJECTION INTRAMUSCULAR; INTRAVENOUS PRN
Status: DISCONTINUED | OUTPATIENT
Start: 2018-03-09 | End: 2018-03-09 | Stop reason: SDUPTHER

## 2018-03-09 RX ORDER — ROCURONIUM BROMIDE 10 MG/ML
INJECTION, SOLUTION INTRAVENOUS PRN
Status: DISCONTINUED | OUTPATIENT
Start: 2018-03-09 | End: 2018-03-09 | Stop reason: SDUPTHER

## 2018-03-09 RX ORDER — CEFTRIAXONE 1 G/1
INJECTION, POWDER, FOR SOLUTION INTRAMUSCULAR; INTRAVENOUS PRN
Status: DISCONTINUED | OUTPATIENT
Start: 2018-03-09 | End: 2018-03-09

## 2018-03-09 RX ORDER — SCOLOPAMINE TRANSDERMAL SYSTEM 1 MG/1
PATCH, EXTENDED RELEASE TRANSDERMAL PRN
Status: DISCONTINUED | OUTPATIENT
Start: 2018-03-09 | End: 2018-03-09 | Stop reason: SDUPTHER

## 2018-03-09 RX ORDER — FENTANYL CITRATE 50 UG/ML
50 INJECTION, SOLUTION INTRAMUSCULAR; INTRAVENOUS EVERY 5 MIN PRN
Status: DISCONTINUED | OUTPATIENT
Start: 2018-03-09 | End: 2018-03-09 | Stop reason: HOSPADM

## 2018-03-09 RX ORDER — DEXAMETHASONE SODIUM PHOSPHATE 4 MG/ML
INJECTION, SOLUTION INTRA-ARTICULAR; INTRALESIONAL; INTRAMUSCULAR; INTRAVENOUS; SOFT TISSUE PRN
Status: DISCONTINUED | OUTPATIENT
Start: 2018-03-09 | End: 2018-03-09 | Stop reason: SDUPTHER

## 2018-03-09 RX ORDER — SODIUM CHLORIDE 0.9 % (FLUSH) 0.9 %
10 SYRINGE (ML) INJECTION PRN
Status: DISCONTINUED | OUTPATIENT
Start: 2018-03-09 | End: 2018-03-10 | Stop reason: HOSPADM

## 2018-03-09 RX ORDER — SCOLOPAMINE TRANSDERMAL SYSTEM 1 MG/1
PATCH, EXTENDED RELEASE TRANSDERMAL
Status: DISPENSED
Start: 2018-03-09 | End: 2018-03-10

## 2018-03-09 RX ADMIN — FENTANYL CITRATE 50 MCG: 50 INJECTION, SOLUTION INTRAMUSCULAR; INTRAVENOUS at 16:00

## 2018-03-09 RX ADMIN — FENTANYL CITRATE 50 MCG: 50 INJECTION INTRAMUSCULAR; INTRAVENOUS at 14:50

## 2018-03-09 RX ADMIN — FENTANYL CITRATE 50 MCG: 50 INJECTION INTRAMUSCULAR; INTRAVENOUS at 14:43

## 2018-03-09 RX ADMIN — SUCCINYLCHOLINE CHLORIDE 120 MG: 20 INJECTION, SOLUTION INTRAMUSCULAR; INTRAVENOUS at 14:43

## 2018-03-09 RX ADMIN — MIDAZOLAM HYDROCHLORIDE 2 MG: 1 INJECTION, SOLUTION INTRAMUSCULAR; INTRAVENOUS at 14:41

## 2018-03-09 RX ADMIN — FENTANYL CITRATE 50 MCG: 50 INJECTION INTRAMUSCULAR; INTRAVENOUS at 15:29

## 2018-03-09 RX ADMIN — SODIUM CHLORIDE: 9 INJECTION, SOLUTION INTRAVENOUS at 12:24

## 2018-03-09 RX ADMIN — SODIUM CHLORIDE: 9 INJECTION, SOLUTION INTRAVENOUS at 18:52

## 2018-03-09 RX ADMIN — HYDROMORPHONE HYDROCHLORIDE 0.5 MG: 1 INJECTION, SOLUTION INTRAMUSCULAR; INTRAVENOUS; SUBCUTANEOUS at 20:20

## 2018-03-09 RX ADMIN — CEFTRIAXONE 1 G: 1 INJECTION, POWDER, FOR SOLUTION INTRAMUSCULAR; INTRAVENOUS at 14:54

## 2018-03-09 RX ADMIN — SCOPALAMINE 1 PATCH: 1 PATCH, EXTENDED RELEASE TRANSDERMAL at 13:11

## 2018-03-09 RX ADMIN — HYDROMORPHONE HYDROCHLORIDE 0.5 MG: 1 INJECTION, SOLUTION INTRAMUSCULAR; INTRAVENOUS; SUBCUTANEOUS at 23:44

## 2018-03-09 RX ADMIN — ONDANSETRON 4 MG: 2 INJECTION INTRAMUSCULAR; INTRAVENOUS at 14:50

## 2018-03-09 RX ADMIN — LIDOCAINE HYDROCHLORIDE 50 MG: 10 INJECTION, SOLUTION INFILTRATION; PERINEURAL at 14:43

## 2018-03-09 RX ADMIN — PROPOFOL 200 MG: 10 INJECTION, EMULSION INTRAVENOUS at 14:43

## 2018-03-09 RX ADMIN — FENTANYL CITRATE 50 MCG: 50 INJECTION, SOLUTION INTRAMUSCULAR; INTRAVENOUS at 15:50

## 2018-03-09 RX ADMIN — HYDROMORPHONE HYDROCHLORIDE 0.5 MG: 1 INJECTION, SOLUTION INTRAMUSCULAR; INTRAVENOUS; SUBCUTANEOUS at 17:51

## 2018-03-09 RX ADMIN — Medication 50 MG: at 14:50

## 2018-03-09 RX ADMIN — HYDROMORPHONE HYDROCHLORIDE 0.25 MG: 1 INJECTION, SOLUTION INTRAMUSCULAR; INTRAVENOUS; SUBCUTANEOUS at 16:14

## 2018-03-09 RX ADMIN — LABETALOL HYDROCHLORIDE 10 MG: 5 INJECTION INTRAVENOUS at 15:58

## 2018-03-09 RX ADMIN — DEXAMETHASONE SODIUM PHOSPHATE 8 MG: 4 INJECTION, SOLUTION INTRAMUSCULAR; INTRAVENOUS at 14:50

## 2018-03-09 RX ADMIN — KETOROLAC TROMETHAMINE 30 MG: 30 INJECTION, SOLUTION INTRAMUSCULAR; INTRAVENOUS at 15:15

## 2018-03-09 RX ADMIN — FENTANYL CITRATE 50 MCG: 50 INJECTION INTRAMUSCULAR; INTRAVENOUS at 15:01

## 2018-03-09 RX ADMIN — SUGAMMADEX 260 MG: 100 INJECTION, SOLUTION INTRAVENOUS at 15:16

## 2018-03-09 ASSESSMENT — PULMONARY FUNCTION TESTS
PIF_VALUE: 22
PIF_VALUE: 28
PIF_VALUE: 24
PIF_VALUE: 29
PIF_VALUE: 23
PIF_VALUE: 19
PIF_VALUE: 20
PIF_VALUE: 29
PIF_VALUE: 20
PIF_VALUE: 21
PIF_VALUE: 0
PIF_VALUE: 22
PIF_VALUE: 30
PIF_VALUE: 1
PIF_VALUE: 25
PIF_VALUE: 19
PIF_VALUE: 19
PIF_VALUE: 22
PIF_VALUE: 26
PIF_VALUE: 26
PIF_VALUE: 4
PIF_VALUE: 25
PIF_VALUE: 28
PIF_VALUE: 29
PIF_VALUE: 30
PIF_VALUE: 29
PIF_VALUE: 20
PIF_VALUE: 28
PIF_VALUE: 28
PIF_VALUE: 27
PIF_VALUE: 20
PIF_VALUE: 26
PIF_VALUE: 22
PIF_VALUE: 27
PIF_VALUE: 19
PIF_VALUE: 3
PIF_VALUE: 22
PIF_VALUE: 28
PIF_VALUE: 18
PIF_VALUE: 22
PIF_VALUE: 21
PIF_VALUE: 30
PIF_VALUE: 22

## 2018-03-09 ASSESSMENT — PAIN DESCRIPTION - PAIN TYPE
TYPE: SURGICAL PAIN

## 2018-03-09 ASSESSMENT — PAIN SCALES - GENERAL
PAINLEVEL_OUTOF10: 6
PAINLEVEL_OUTOF10: 2
PAINLEVEL_OUTOF10: 7
PAINLEVEL_OUTOF10: 8
PAINLEVEL_OUTOF10: 7
PAINLEVEL_OUTOF10: 7
PAINLEVEL_OUTOF10: 6
PAINLEVEL_OUTOF10: 7
PAINLEVEL_OUTOF10: 5
PAINLEVEL_OUTOF10: 7
PAINLEVEL_OUTOF10: 5

## 2018-03-09 ASSESSMENT — PAIN DESCRIPTION - LOCATION
LOCATION: ABDOMEN

## 2018-03-09 ASSESSMENT — PAIN DESCRIPTION - FREQUENCY
FREQUENCY: CONTINUOUS
FREQUENCY: CONTINUOUS

## 2018-03-09 ASSESSMENT — PAIN DESCRIPTION - PROGRESSION
CLINICAL_PROGRESSION: NOT CHANGED
CLINICAL_PROGRESSION: NOT CHANGED

## 2018-03-09 ASSESSMENT — PAIN DESCRIPTION - DESCRIPTORS
DESCRIPTORS: SHARP
DESCRIPTORS: DISCOMFORT
DESCRIPTORS: SHARP
DESCRIPTORS: DISCOMFORT
DESCRIPTORS: DISCOMFORT

## 2018-03-09 ASSESSMENT — PAIN DESCRIPTION - ORIENTATION
ORIENTATION: RIGHT;LOWER
ORIENTATION: RIGHT;LOWER

## 2018-03-09 ASSESSMENT — PAIN DESCRIPTION - ONSET
ONSET: ON-GOING
ONSET: ON-GOING

## 2018-03-09 NOTE — ANESTHESIA POSTPROCEDURE EVALUATION
complications    Additional Follow-Up / Treatment / Comment:  None    Court Spence.  DO Crystal  March 9, 2018   4:33 PM

## 2018-03-09 NOTE — FLOWSHEET NOTE
03/09/18 1714   Vital Signs   Temp 97.6 °F (36.4 °C)   Temp Source Oral   Pulse 72   Heart Rate Source Monitor   Resp 16   /73   BP Location Right Arm   BP Upper/Lower Lower   MAP (mmHg) 94   Pain Assessment   Pain Level 7   Oxygen Therapy   SpO2 98 %   O2 Device Nasal cannula   Returned from [employer] via bed. Alert oriented. Resp easy nonlabored. Lung sounds clear. o2 at 2 l per n/c Bowel sounds active. Skin warm and dry. Abdominal surgical sites times 3 dry and intact. 0.9 infusing at 100 ml hour via left hand with 200 ml to credit. Bilateral scd on.

## 2018-03-09 NOTE — ANESTHESIA PRE PROCEDURE
Department of Anesthesiology  Preprocedure Note       Name:  Cole White   Age:  42 y.o.  :  1980                                          MRN:  [id number]         Date:  3/9/2018      Surgeon: Lucas Elizondo):  Kosta Lewis MD    Procedure: Procedure(s):  DIAGNOSTIC LAPAROSCOPY, APPENDECTOMY, POSSIBLE OPEN    Medications prior to admission:   Prior to Admission medications    Medication Sig Start Date End Date Taking? Authorizing Provider   ibuprofen (ADVIL;MOTRIN) 600 MG tablet Take 1 tablet by mouth every 6 hours as needed for Pain 3/5/18  Yes Magaly Mariano PA-C   dicyclomine (BENTYL) 10 MG capsule Take 1 capsule by mouth every 6 hours as needed (cramps) 3/5/18  Yes Magaly Mariano PA-C   ondansetron (ZOFRAN ODT) 4 MG disintegrating tablet Take 1 tablet by mouth every 8 hours as needed for Nausea 3/5/18  Yes Magaly Mariano PA-C   acetaminophen (TYLENOL) 500 MG tablet Take 1,000 mg by mouth every 6 hours as needed for Pain   Yes Historical Provider, MD       Current medications:    Current Facility-Administered Medications   Medication Dose Route Frequency Provider Last Rate Last Dose    0.9 % sodium chloride infusion   Intravenous Continuous Meredith Reveles  mL/hr at 43/ 1224         Allergies: Allergies   Allergen Reactions    Tape Wileen Maul Tape] Rash       Problem List:    Patient Active Problem List   Diagnosis Code    Patellofemoral stress syndrome of right knee M22.2X1    Right knee meniscal tear S83.206A    Osteochondral defect of patella, right M95.8    S/P laparoscopy D04.301       Past Medical History:  History reviewed. No pertinent past medical history. Past Surgical History:        Procedure Laterality Date    BACK SURGERY      post aa    CARPAL TUNNEL RELEASE Right     DILATION AND CURETTAGE OF UTERUS      KNEE ARTHROSCOPY Right 2015    Medial Menesectomy with Chrondroplasty - Dr Sofia Storm  age 19    endometriosis.      TONSILLECTOMY

## 2018-03-10 VITALS
WEIGHT: 292 LBS | TEMPERATURE: 96.5 F | DIASTOLIC BLOOD PRESSURE: 73 MMHG | RESPIRATION RATE: 16 BRPM | SYSTOLIC BLOOD PRESSURE: 136 MMHG | OXYGEN SATURATION: 97 % | HEART RATE: 66 BPM | BODY MASS INDEX: 40.74 KG/M2

## 2018-03-10 LAB
BASOPHILS # BLD: 0.3 %
BASOPHILS ABSOLUTE: 0 THOU/MM3 (ref 0–0.1)
EOSINOPHIL # BLD: 0 %
EOSINOPHILS ABSOLUTE: 0 THOU/MM3 (ref 0–0.4)
HCT VFR BLD CALC: 36.4 % (ref 37–47)
HEMOGLOBIN: 12.2 GM/DL (ref 12–16)
LYMPHOCYTES # BLD: 7.9 %
LYMPHOCYTES ABSOLUTE: 1.1 THOU/MM3 (ref 1–4.8)
MCH RBC QN AUTO: 27.9 PG (ref 27–31)
MCHC RBC AUTO-ENTMCNC: 33.4 GM/DL (ref 33–37)
MCV RBC AUTO: 83.5 FL (ref 81–99)
MONOCYTES # BLD: 4.2 %
MONOCYTES ABSOLUTE: 0.6 THOU/MM3 (ref 0.4–1.3)
NUCLEATED RED BLOOD CELLS: 0 /100 WBC
PDW BLD-RTO: 13.6 % (ref 11.5–14.5)
PLATELET # BLD: 184 THOU/MM3 (ref 130–400)
PMV BLD AUTO: 10 FL (ref 7.4–10.4)
RBC # BLD: 4.36 MILL/MM3 (ref 4.2–5.4)
SEG NEUTROPHILS: 87.6 %
SEGMENTED NEUTROPHILS ABSOLUTE COUNT: 11.8 THOU/MM3 (ref 1.8–7.7)
WBC # BLD: 13.5 THOU/MM3 (ref 4.8–10.8)

## 2018-03-10 PROCEDURE — 6360000002 HC RX W HCPCS: Performed by: SURGERY

## 2018-03-10 PROCEDURE — 96372 THER/PROPH/DIAG INJ SC/IM: CPT

## 2018-03-10 PROCEDURE — 99024 POSTOP FOLLOW-UP VISIT: CPT | Performed by: SURGERY

## 2018-03-10 PROCEDURE — 96376 TX/PRO/DX INJ SAME DRUG ADON: CPT

## 2018-03-10 PROCEDURE — 96360 HYDRATION IV INFUSION INIT: CPT

## 2018-03-10 PROCEDURE — 96361 HYDRATE IV INFUSION ADD-ON: CPT

## 2018-03-10 PROCEDURE — 85025 COMPLETE CBC W/AUTO DIFF WBC: CPT

## 2018-03-10 PROCEDURE — 2580000003 HC RX 258: Performed by: SURGERY

## 2018-03-10 PROCEDURE — 6370000000 HC RX 637 (ALT 250 FOR IP): Performed by: SURGERY

## 2018-03-10 PROCEDURE — 36415 COLL VENOUS BLD VENIPUNCTURE: CPT

## 2018-03-10 RX ORDER — OXYCODONE HYDROCHLORIDE AND ACETAMINOPHEN 5; 325 MG/1; MG/1
1 TABLET ORAL EVERY 6 HOURS PRN
Qty: 20 TABLET | Refills: 0 | Status: SHIPPED | OUTPATIENT
Start: 2018-03-10 | End: 2018-03-16

## 2018-03-10 RX ADMIN — SODIUM CHLORIDE: 9 INJECTION, SOLUTION INTRAVENOUS at 02:05

## 2018-03-10 RX ADMIN — HYDROMORPHONE HYDROCHLORIDE 0.5 MG: 1 INJECTION, SOLUTION INTRAMUSCULAR; INTRAVENOUS; SUBCUTANEOUS at 03:33

## 2018-03-10 RX ADMIN — OXYCODONE HYDROCHLORIDE AND ACETAMINOPHEN 1 TABLET: 5; 325 TABLET ORAL at 11:54

## 2018-03-10 RX ADMIN — ENOXAPARIN SODIUM 40 MG: 40 INJECTION SUBCUTANEOUS at 08:43

## 2018-03-10 RX ADMIN — OXYCODONE HYDROCHLORIDE AND ACETAMINOPHEN 1 TABLET: 5; 325 TABLET ORAL at 07:39

## 2018-03-10 ASSESSMENT — PAIN DESCRIPTION - FREQUENCY: FREQUENCY: CONTINUOUS

## 2018-03-10 ASSESSMENT — PAIN SCALES - GENERAL
PAINLEVEL_OUTOF10: 6
PAINLEVEL_OUTOF10: 5
PAINLEVEL_OUTOF10: 3
PAINLEVEL_OUTOF10: 7
PAINLEVEL_OUTOF10: 4
PAINLEVEL_OUTOF10: 3
PAINLEVEL_OUTOF10: 5
PAINLEVEL_OUTOF10: 7
PAINLEVEL_OUTOF10: 4

## 2018-03-10 ASSESSMENT — PAIN DESCRIPTION - ONSET: ONSET: ON-GOING

## 2018-03-10 ASSESSMENT — PAIN DESCRIPTION - PAIN TYPE: TYPE: SURGICAL PAIN

## 2018-03-10 ASSESSMENT — PAIN DESCRIPTION - LOCATION: LOCATION: ABDOMEN

## 2018-03-10 ASSESSMENT — PAIN DESCRIPTION - DESCRIPTORS: DESCRIPTORS: ACHING;DISCOMFORT

## 2018-03-10 NOTE — PLAN OF CARE
Problem: Pain:  Goal: Pain level will decrease  Pain level will decrease   Outcome: Ongoing  Patient stated pain goal 4. Patient rating pain 3/10 on 0-10 pain scale, and currently reaching pain goal. Dilaudid given for surgical pain rated 4-6, sharp, intermittent, gradual.    Problem: Cardiovascular  Goal: No DVT, peripheral vascular complications  Outcome: Ongoing  Patient free from DVT and peripheral vascular complications as evidence by no pain, redness, edema, or heat in extremities. Patient ambulating in room, use of bilateral SCD's on. Problem: Respiratory  Goal: O2 Sat > 90%  Outcome: Ongoing  Patient sating >90% on room air, tolerating well. Problem: GI  Goal: No bowel complications  Outcome: Ongoing  Bowel sounds active x4, soft upon palpation, tender, not currently passing flatus. No BM this shift. Problem:   Goal: Adequate urinary output  Outcome: Ongoing  Patient voided 400 clear yellow urine at 2345. No discomfort or burning noted by patient. Problem: Nutrition  Goal: Optimal nutrition therapy  Outcome: Ongoing  Patient tolerated clear liquids, advanced to general diet, tolerating well, no nausea or vomiting reported by patient this shift. Problem: Skin Integrity/Risk  Goal: Wound healing  Outcome: Ongoing  x3 surgical incision sites with steri strips, site on right side has old drainage, reinforced with 2x2    Problem: Musculor/Skeletal Functional Status  Goal: Absence of falls  Outcome: Ongoing  Patient free from falls this shift. Call light within reach, patient voices understanding of use for assistance. 2/4 bed rails up, non-skid socks on patient in bed. Care board updated, labs in the morning, fall prevention reviewed with protocols in place, up with assistance. ID, allergy and fall bands on, Ambulates with a steady gait. Plan of care reviewed with patient.        Problem: DISCHARGE BARRIERS  Goal: Patient's continuum of care needs are met  Outcome: Ongoing  Patient planning

## 2018-03-10 NOTE — OP NOTE
[address] [place] [address]                                 OPERATIVE REPORT    PATIENT NAME: Kiko Stanford                     :        1980  MED REC NO:   [id number]                           ROOM:       0450  ACCOUNT NO:   [de-identified]                           ADMIT DATE: 2018  PROVIDER:     Bakari Palmer MD    DATE OF PROCEDURE:  2018    PREOPERATIVE DIAGNOSIS:  Persistent right lower quadrant pain. POSTOPERATIVE DIAGNOSES:  1. Possible appendicitis. 2.  Infarcted appendix epiploica. SURGEON:  Bakari Lopez. MD Aga    ANESTHESIA:  General.    COMPLICATIONS:  None. INDICATIONS FOR PROCEDURE:  The patient is a 49-year-old white female who  has now had almost a week history of persistent right lower quadrant pain. She had been in the emergency room 4 days ago. A CT scan showed the  appendix to be normal, but did have an elevated white count at 12,000. The  patient was seen in the office, was felt that she might still possibly have  appendicitis but she was directed to call the office if the pain persisted. She basically called last evening stating that her pain was persistent. She is _____ to the surgery scheduled for laparoscopy. FINDINGS:  To me the appendix did appear to be mildly inflamed or  chronically firm, although just outside the cecum on the serosa of the  ascending colon was an infarcted appendix epiploica, it was quite small. Pictures were taken. It was removed. PROCEDURE:  The patient was brought to the operating suite, placed supine  on the operating room table. After adequate inhalational anesthesia was  administered, the patient's abdomen was prepped and draped in usual sterile  fashion. Incision was made above the umbilicus. A Veress needle was  placed and CO2 was insufflated to a pressure of 15. We then removed the  Veress needle.   A trocar was placed and a camera was placed through the  trocar verifying abdominal placement of the trocar. Again just underneath  the umbilicus and at the umbilicus with a loop of small bowel, it was  adhered up and we were fortunate that we had gone above the umbilicus to  place the trocar. We were able to see down in the midline and I placed a  5-mm port in the incision in the lower abdomen and then a 12-mm lateral  abdominal wall port. As reviewed over to the cecum, you could see this  purple structure on the serosa of the colon and initially I thought it  represented a blood clot, on further inspection it was infarcted appendix  epiploica. Pictures were taken. We went down and viewed the appendix. It  appeared to be dilated and firm and we fired an Endo-AP across the base of  the appendix  it from the cecum and two firings of the Endo-AP  divided the mesoappendix and the appendix was delivered out of the  abdominal cavity via the lateral abdominal wall trocar. We then went down,  grasped this appendix epiploica and trimmed it right at the serosa, care  being taken not to injure the bowel. It was likewise retrieved. The area  was irrigated. We went down to view the pelvis that there was some scar  tissue from her prior surgery, could not really see down into the pelvis. At this point in time, trocars were removed as closure was begun. Interrupted 4-0 Vicryl was used to close the skin and Steri-Strips were  applied. The patient tolerated the procedure well. LUDA DIEGO [facility] TREATMENT [facility], MD    D: 03/09/2018 18:02:47       T: 03/09/2018 18:15:06     [email or web address]  Job#: [id number]     Doc#: [id number]    CC:

## 2018-03-12 ENCOUNTER — TELEPHONE (OUTPATIENT)
Dept: SURGERY | Age: 38
End: 2018-03-12

## 2018-03-12 ENCOUNTER — TELEPHONE (OUTPATIENT)
Dept: FAMILY MEDICINE CLINIC | Age: 38
End: 2018-03-12

## 2018-03-14 NOTE — PROGRESS NOTES
[facility] PROFESSIONAL SERVS  [facility]  [address] [address] [address]  [address] [facility]  Dept: [phone]  Dept Fax: [phone]  Loc: [phone]    Visit Date: 3/6/2018    Levan Rubinstein is a 42 y.o. female who presents today for:  Chief Complaint   Patient presents with   [facility] Surgical Consult     new pt-was in ED yesterday-3/5-rt lower abdominal pain-labs and ct scan done        HPI:     HPI. 9 she she has no family history-year-old white female who's had about a one-week history of right lower quadrant pain. It essentially came on rather suddenly and persisted and she went to the emergency room yesterday and had a CT scan showing several findings but no definitive appendicitis. She had bilateral adrenal adenomas less than 2 cm also had stool in the ileum. She denies any change of bowel habits no diarrhea or constipation she has no family history of appendicitis she has had some nausea associated with this she denies any dysuria she also has had a total abdominal hysterectomy with bilateral salpingo-oophorectomy    History reviewed. No pertinent past medical history. Past Surgical History:   Procedure Laterality Date    BACK SURGERY      post aa    CARPAL TUNNEL RELEASE Right     DILATION AND CURETTAGE OF UTERUS      KNEE ARTHROSCOPY Right 11/05/2015    Medial Menesectomy with Chrondroplasty - Dr Eliana Coats N/A 3/9/2018    DIAGNOSTIC LAPAROSCOPY, APPENDECTOMY, infarcted appendix plancha performed by Can Pérez MD at [address] [place] [address] Street  age 19    endometriosis.      TONSILLECTOMY       Family History   Problem Relation Age of Onset    Colon Cancer Paternal Grandmother 80    Breast Cancer Maternal Grandmother 80     Social History   Substance Use Topics    Smoking status: Never Smoker    Smokeless tobacco: Never Used    Alcohol use No       Current Outpatient Prescriptions   Medication Sig Dispense Refill    ibuprofen (ADVIL;MOTRIN) 600 MG myalgias, neck pain and neck stiffness. Skin: Negative for color change, pallor, rash and wound. Allergic/Immunologic: Negative for environmental allergies, food allergies and immunocompromised state. Neurological: Positive for light-headedness. Negative for dizziness, tremors, seizures, syncope, facial asymmetry, speech difficulty, weakness, numbness and headaches. Hematological: Negative for adenopathy. Bruises/bleeds easily. Psychiatric/Behavioral: Negative for agitation, behavioral problems, confusion, decreased concentration, dysphoric mood, hallucinations, self-injury, sleep disturbance and suicidal ideas. The patient is not nervous/anxious and is not hyperactive. Objective:   /84 (Site: Right Arm, Position: Sitting, Cuff Size: Medium Adult)   Pulse 90   Temp 97.7 °F (36.5 °C) (Tympanic)   Resp 18   Ht 5' 11\" (1.803 m)   Wt 280 lb (127 kg)   SpO2 97%   Breastfeeding? No   BMI 39.05 kg/m²     Physical Exam   Constitutional: She is oriented to person, place, and time. She appears well-developed and well-nourished. HENT:   Head: Normocephalic and atraumatic. Eyes: Conjunctivae and EOM are normal. Pupils are equal, round, and reactive to light. Right eye exhibits no discharge. Left eye exhibits no discharge. No scleral icterus. Neck: Normal range of motion. Neck supple. No JVD present. No thyromegaly present. Cardiovascular: Normal rate and regular rhythm. Exam reveals no gallop and no friction rub. No murmur heard. Pulmonary/Chest: Effort normal and breath sounds normal. No stridor. No respiratory distress. She has no wheezes. She exhibits no tenderness. Abdominal: She exhibits no distension. There is tenderness. There is guarding. Musculoskeletal: Normal range of motion. Neurological: She is alert and oriented to person, place, and time. Skin: Skin is warm and dry. No rash noted. No erythema. No pallor. Psychiatric: She has a normal mood and affect.  Her CLEAR-SL C   C-reactive protein   Result Value Ref Range    CRP 1.31 (H) 0.00 - 1.00 mg/dl   Magnesium   Result Value Ref Range    Magnesium 2.1 1.6 - 2.4 mg/dL   Phosphorus   Result Value Ref Range    Phosphorus 4.4 2.4 - 4.7 mg/dL   Lactic acid, plasma   Result Value Ref Range    Lactic Acid 1.8 0.5 - 2.2 mmol/L   Anion Gap   Result Value Ref Range    Anion Gap 15.0 8.0 - 16.0 meq/L   Osmolality   Result Value Ref Range    Osmolality Calc 282.5 275.0 - 300 mOsmol/kg   Glomerular Filtration Rate, Estimated   Result Value Ref Range    Est, Glom Filt Rate >90 ml/min/1.73m2       Assessment:     Persistent right lower quadrant pain over one week with mild leukocytosis. However CT scan was not confirmatory of appendicitis. On examination she does have tenderness in the right lower quadrant we did discuss the adrenal adenomas and need for repeat  CT scan in 6 months to a year.   We did discuss appendicitis and the fact that it can be chronic at this time we will continue to monitor the patient but I informed her that if she continues to hurt over the next 48  hours to call me and we will consider laparoscopy she voices understanding    Plan:     Patient will call if pain persists if she calls we'll consider laparoscopy appendectomy      Electronically signed by Pushpa Sy MD on 3/13/2018 at 10:30 PM

## 2018-03-14 NOTE — H&P
[address] [place] [address]                         PREOPERATIVE HISTORY AND PHYSICAL    PATIENT NAME: Sudhir Drake                     :        1980  MED REC NO:   [id number]                           ROOM:       0224  ACCOUNT NO:   [de-identified]                           ADMIT DATE: 2018  PROVIDER:     Sheri Rios. Homero Charles MD    CHIEF COMPLAINT:  Persistent right lower quadrant pain. HISTORY OF PRESENT ILLNESS:  The patient is a 68-year-old white female who  was just seen in the office on the . She has had basically several day  history of right lower quadrant pain. She presented to the emergency room  on the  with this pain, had a CT scan done that was read as normal.  Did  have a mild elevation of white count and then was seen in my office on the  . The patient was still having some mild pain. No real nausea or  vomiting and it was pain right over McBurney's point. I indicated to the  patient that if it persisted she should call me and we would consider a  laparoscopy. The patient called this morning, is persisting with the pain  in the right lower quadrant. She is being admitted at this time for  laparoscopy, appendectomy. PAST MEDICAL HISTORY:  Negative for any known hypertension, diabetes, or  coronary artery disease. PAST SURGICAL HISTORY:  Includes carpal tunnel. She has had a D and C. She has had a right medial meniscectomy and chondroplasty. She has had a  diagnostic laparoscopy in the past.  She has had total abdominal  hysterectomy, BSO for endometriosis. She has had a tonsillectomy. FAMILY HISTORY:  Positive for colon cancer and breast cancer in  grandparents. SOCIAL HISTORY:  She is a nondrinker, nonsmoker. MEDICATIONS:  Motrin, Bentyl, Zofran, Tylenol. PHYSICAL EXAMINATION:  GENERAL:  The patient is a 68-year-old obese white female, appears her age.   HEAD, EARS, EYES, NOSE AND THROAT:  Show no scleral icterus. CARDIOVASCULAR:  S1, S2.  LUNGS:  The respirations are clear. ABDOMEN:  Soft. Bowel sounds are positive except in the right lower  quadrant where she has clear-cut pain to palpation over the McBurney's  point. ASSESSMENT AND PLAN:  Possible chronic mild appendicitis. The patient is  being admitted for diagnostic laparoscopy, appendectomy. LUDA DIEGO [facility] TREATMENT [facility], MD      D: 03/13/2018 22:50:28       T: 03/13/2018 22:52:04     [email or web address]  Job#: [id number]     Doc#: [id number]

## 2018-03-22 ENCOUNTER — HOSPITAL ENCOUNTER (OUTPATIENT)
Age: 38
Discharge: HOME | End: 2018-03-22

## 2018-03-22 ENCOUNTER — HOSPITAL ENCOUNTER (OUTPATIENT)
Dept: ULTRASOUND IMAGING | Age: 38
Discharge: HOME | End: 2018-03-22

## 2018-03-22 DIAGNOSIS — R22.1 NECK MASS: ICD-10-CM

## 2018-03-22 LAB
ALBUMIN SERPL-MCNC: 3.8 G/DL (ref 3.5–5.1)
ALP BLD-CCNC: 91 U/L (ref 38–126)
ALT SERPL-CCNC: 19 U/L (ref 11–66)
ANION GAP SERPL CALCULATED.3IONS-SCNC: 16 MEQ/L (ref 8–16)
AST SERPL-CCNC: 14 U/L (ref 5–40)
BASOPHILS # BLD: 1 %
BASOPHILS ABSOLUTE: 0.1 THOU/MM3 (ref 0–0.1)
BILIRUB SERPL-MCNC: 0.3 MG/DL (ref 0.3–1.2)
BUN BLDV-MCNC: 15 MG/DL (ref 7–22)
CALCIUM SERPL-MCNC: 9.3 MG/DL (ref 8.5–10.5)
CHLORIDE BLD-SCNC: 102 MEQ/L (ref 98–111)
CO2: 22 MEQ/L (ref 23–33)
CREAT SERPL-MCNC: 0.7 MG/DL (ref 0.4–1.2)
EOSINOPHIL # BLD: 2.3 %
EOSINOPHILS ABSOLUTE: 0.3 THOU/MM3 (ref 0–0.4)
GFR SERPL CREATININE-BSD FRML MDRD: > 90 ML/MIN/1.73M2
GLUCOSE BLD-MCNC: 98 MG/DL (ref 70–108)
HCT VFR BLD CALC: 38.5 % (ref 37–47)
HEMOGLOBIN: 13 GM/DL (ref 12–16)
LYMPHOCYTES # BLD: 30.4 %
LYMPHOCYTES ABSOLUTE: 3.3 THOU/MM3 (ref 1–4.8)
MCH RBC QN AUTO: 27.8 PG (ref 27–31)
MCHC RBC AUTO-ENTMCNC: 33.8 GM/DL (ref 33–37)
MCV RBC AUTO: 82.3 FL (ref 81–99)
MONOCYTES # BLD: 5.3 %
MONOCYTES ABSOLUTE: 0.6 THOU/MM3 (ref 0.4–1.3)
NUCLEATED RED BLOOD CELLS: 0 /100 WBC
PDW BLD-RTO: 13.6 % (ref 11.5–14.5)
PLATELET # BLD: 193 THOU/MM3 (ref 130–400)
PLATELET ESTIMATE: ADEQUATE
PMV BLD AUTO: 10.2 FL (ref 7.4–10.4)
POTASSIUM SERPL-SCNC: 4.2 MEQ/L (ref 3.5–5.2)
RBC # BLD: 4.69 MILL/MM3 (ref 4.2–5.4)
SCAN OF BLOOD SMEAR: NORMAL
SEG NEUTROPHILS: 61 %
SEGMENTED NEUTROPHILS ABSOLUTE COUNT: 6.6 THOU/MM3 (ref 1.8–7.7)
SODIUM BLD-SCNC: 140 MEQ/L (ref 135–145)
TOTAL PROTEIN: 6.8 G/DL (ref 6.1–8)
TSH SERPL DL<=0.05 MIU/L-ACNC: 2.19 UIU/ML (ref 0.4–4.2)
WBC # BLD: 10.9 THOU/MM3 (ref 4.8–10.8)

## 2018-03-22 PROCEDURE — 85025 COMPLETE CBC W/AUTO DIFF WBC: CPT

## 2018-03-22 PROCEDURE — 84443 ASSAY THYROID STIM HORMONE: CPT

## 2018-03-22 PROCEDURE — 76536 US EXAM OF HEAD AND NECK: CPT

## 2018-03-22 PROCEDURE — 36415 COLL VENOUS BLD VENIPUNCTURE: CPT

## 2018-03-22 PROCEDURE — 80053 COMPREHEN METABOLIC PANEL: CPT

## 2018-03-23 ENCOUNTER — TELEPHONE (OUTPATIENT)
Dept: FAMILY MEDICINE CLINIC | Age: 38
End: 2018-03-23

## 2018-03-23 DIAGNOSIS — R22.1 MASS OF THYROID REGION: Primary | ICD-10-CM

## 2018-03-23 ASSESSMENT — ENCOUNTER SYMPTOMS
WHEEZING: 0
EYE PAIN: 0
FACIAL SWELLING: 0
SHORTNESS OF BREATH: 0
STRIDOR: 0
ABDOMINAL PAIN: 1
VOMITING: 1
COUGH: 0
SINUS PAIN: 0
SORE THROAT: 0
EYE DISCHARGE: 0
CHOKING: 0
RHINORRHEA: 0
BACK PAIN: 0
COLOR CHANGE: 0
PHOTOPHOBIA: 0
TROUBLE SWALLOWING: 1
APNEA: 0
NAUSEA: 1
EYE ITCHING: 0
VOICE CHANGE: 0
SINUS PRESSURE: 0
EYE REDNESS: 0
CHEST TIGHTNESS: 0

## 2018-03-23 NOTE — TELEPHONE ENCOUNTER
----- Message from Doretha Brock DO sent at 3/23/2018  6:34 AM EDT -----  Please let pt know that thyroid US showed 2 nodules. Larger one in the R thyroid lobe, which I think correlates to what we were feeling at her office visit. Also there is a much smaller nodule on L side, likely can't feel that one. Radiologist is recommending we get a biopsy of the nodule on the R part of the gland. This is done through radiology, I will order that. The nodule on the L side is too small to biopsy, it is recommended we repeat the US in 6 months. Will call her to schedule when time gets closer. Her labs were normal. Let me know if questions, thanks!

## 2018-03-27 ENCOUNTER — OFFICE VISIT (OUTPATIENT)
Dept: SURGERY | Age: 38
End: 2018-03-27

## 2018-03-27 ENCOUNTER — TELEPHONE (OUTPATIENT)
Dept: SURGERY | Age: 38
End: 2018-03-27

## 2018-03-27 VITALS
WEIGHT: 288 LBS | DIASTOLIC BLOOD PRESSURE: 84 MMHG | SYSTOLIC BLOOD PRESSURE: 124 MMHG | OXYGEN SATURATION: 98 % | HEART RATE: 86 BPM | RESPIRATION RATE: 18 BRPM | TEMPERATURE: 96.9 F | BODY MASS INDEX: 40.32 KG/M2 | HEIGHT: 71 IN

## 2018-03-27 DIAGNOSIS — Z90.49 S/P LAPAROSCOPIC APPENDECTOMY: ICD-10-CM

## 2018-03-27 DIAGNOSIS — Z98.890 S/P EXPLORATORY LAPAROTOMY: Primary | ICD-10-CM

## 2018-03-27 PROCEDURE — 99024 POSTOP FOLLOW-UP VISIT: CPT | Performed by: NURSE PRACTITIONER

## 2018-03-27 ASSESSMENT — ENCOUNTER SYMPTOMS
BLOOD IN STOOL: 0
DIARRHEA: 0
COLOR CHANGE: 0
NAUSEA: 0
TROUBLE SWALLOWING: 0
SINUS PRESSURE: 0
RECTAL PAIN: 0
CONSTIPATION: 0
CHEST TIGHTNESS: 0
ABDOMINAL PAIN: 0
ABDOMINAL DISTENTION: 0
ANAL BLEEDING: 0
VOMITING: 0
EYE REDNESS: 0
RHINORRHEA: 0
SORE THROAT: 0
PHOTOPHOBIA: 0
WHEEZING: 0
COUGH: 0
EYE ITCHING: 0
EYE DISCHARGE: 0
SHORTNESS OF BREATH: 0
VOICE CHANGE: 0
STRIDOR: 0
EYE PAIN: 0
CHOKING: 0
FACIAL SWELLING: 0
APNEA: 0
BACK PAIN: 0

## 2018-03-27 NOTE — PROGRESS NOTES
[facility] PROFESSIONAL SERVS  [facility]  [address] [address]  [address]  Dept: [phone]  Dept Fax: [phone]  Loc: [phone]    Visit Date: 3/27/2018       Kendell Gibbs is a 42 y.o. female who presents today for:  Chief Complaint   Patient presents with    Post-Op Check     s/p diagnostic lap, appendectomy 3/9/18       HPI:     Ivania Merino presents today for a 2 week post op check. Today She complains of increased hot flashes since surgery. Otherwise doing well. She states the abdominal pain she was having prior to the surgery has resolved and she has had no issues since. She is tolerating meals and having bowel movements. The surgical incisions look good. Pathology has been reviewed and discussed. Patient is scheduled to return to work on April 2. No follow up needed at this time. Call if needed. FINAL DIAGNOSIS:  A. Appendix, resection:   Acute appendicitis. B.  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 History reviewed. No pertinent past medical history. Past Surgical History:   Procedure Laterality Date    BACK SURGERY      post aa    CARPAL TUNNEL RELEASE Right     DILATION AND CURETTAGE OF UTERUS      KNEE ARTHROSCOPY Right 11/05/2015    Medial Menesectomy with Chrondroplasty - Dr Salmon Mask N/A 3/9/2018    DIAGNOSTIC LAPAROSCOPY, APPENDECTOMY, infarcted appendix plancha performed by Diandra Perez MD at [address] [place] [address]  age 19    endometriosis.      TONSILLECTOMY       Family History   Problem Relation Age of Onset    Colon Cancer Paternal Grandmother 80    Breast Cancer Maternal Grandmother 80     Social History   Substance Use Topics    Smoking status: Never Smoker    Smokeless tobacco: Never Used    Alcohol use No        Current Outpatient Prescriptions   Medication Sig Dispense Refill    ibuprofen (ADVIL;MOTRIN) 600 MG tablet Take

## 2018-03-28 ENCOUNTER — HOSPITAL ENCOUNTER (OUTPATIENT)
Dept: ULTRASOUND IMAGING | Age: 38
Discharge: HOME | End: 2018-03-28

## 2018-03-28 DIAGNOSIS — R22.1 MASS OF THYROID REGION: ICD-10-CM

## 2018-03-28 PROCEDURE — 88177 CYTP FNA EVAL EA ADDL: CPT

## 2018-03-28 PROCEDURE — 60300 ASPIR/INJ THYROID CYST: CPT

## 2018-03-28 PROCEDURE — 88173 CYTOPATH EVAL FNA REPORT: CPT

## 2018-03-28 PROCEDURE — 88172 CYTP DX EVAL FNA 1ST EA SITE: CPT

## 2018-03-29 ENCOUNTER — OFFICE VISIT (OUTPATIENT)
Dept: FAMILY MEDICINE CLINIC | Age: 38
End: 2018-03-29
Payer: COMMERCIAL

## 2018-03-29 VITALS
DIASTOLIC BLOOD PRESSURE: 75 MMHG | WEIGHT: 291.4 LBS | SYSTOLIC BLOOD PRESSURE: 134 MMHG | TEMPERATURE: 98.5 F | RESPIRATION RATE: 16 BRPM | HEIGHT: 71 IN | HEART RATE: 72 BPM | BODY MASS INDEX: 40.8 KG/M2

## 2018-03-29 DIAGNOSIS — R23.2 HOT FLASHES: ICD-10-CM

## 2018-03-29 DIAGNOSIS — E66.01 MORBID OBESITY (HCC): ICD-10-CM

## 2018-03-29 DIAGNOSIS — K63.89 EPIPLOIC APPENDAGITIS: ICD-10-CM

## 2018-03-29 DIAGNOSIS — E07.89 PAINFUL THYROID: ICD-10-CM

## 2018-03-29 DIAGNOSIS — E04.1 THYROID NODULE: Primary | ICD-10-CM

## 2018-03-29 PROBLEM — R22.1 MASS OF THYROID REGION: Status: RESOLVED | Noted: 2018-03-23 | Resolved: 2018-03-29

## 2018-03-29 PROCEDURE — 99214 OFFICE O/P EST MOD 30 MIN: CPT | Performed by: FAMILY MEDICINE

## 2018-03-29 NOTE — PROGRESS NOTES
13.0  12.0 - 16.0 gm/dl Final    Hematocrit 03/22/2018 38.5  37.0 - 47.0 % Final    MCV 03/22/2018 82.3  81.0 - 99.0 fL Final    MCH 03/22/2018 27.8  27.0 - 31.0 pg Final    MCHC 03/22/2018 33.8  33.0 - 37.0 gm/dl Final    RDW 03/22/2018 13.6  11.5 - 14.5 % Final    Platelets 74/44/0268 193  130 - 400 thou/mm3 Final    MPV 03/22/2018 10.2  7.4 - 10.4 fL Final    Seg Neutrophils 03/22/2018 61.0  % Final    Lymphocytes 03/22/2018 30.4  % Final    Monocytes 03/22/2018 5.3  % Final    Eosinophils 03/22/2018 2.3  % Final    Basophils 03/22/2018 1.0  % Final    nRBC 03/22/2018 0  /100 wbc Final    Platelet Estimate 28/93/2637 ADEQUATE  Adequate Final    Segs Absolute 03/22/2018 6.6  1.8 - 7.7 thou/mm3 Final    Lymphocytes # 03/22/2018 3.3  1.0 - 4.8 thou/mm3 Final    Monocytes # 03/22/2018 0.6  0.4 - 1.3 thou/mm3 Final    Eosinophils # 03/22/2018 0.3  0.0 - 0.4 thou/mm3 Final    Basophils # 03/22/2018 0.1  0.0 - 0.1 thou/mm3 Final    Glucose 03/22/2018 98  70 - 108 mg/dL Final    CREATININE 03/22/2018 0.7  0.4 - 1.2 mg/dL Final    BUN 03/22/2018 15  7 - 22 mg/dL Final    Sodium 03/22/2018 140  135 - 145 meq/L Final    Potassium 03/22/2018 4.2  3.5 - 5.2 meq/L Final    Chloride 03/22/2018 102  98 - 111 meq/L Final    CO2 03/22/2018 22* 23 - 33 meq/L Final    Calcium 03/22/2018 9.3  8.5 - 10.5 mg/dL Final    AST 03/22/2018 14  5 - 40 U/L Final    Alkaline Phosphatase 03/22/2018 91  38 - 126 U/L Final    Total Protein 03/22/2018 6.8  6.1 - 8.0 g/dL Final    Alb 03/22/2018 3.8  3.5 - 5.1 g/dL Final    Total Bilirubin 03/22/2018 0.3  0.3 - 1.2 mg/dL Final    ALT 03/22/2018 19  11 - 66 U/L Final    TSH 03/22/2018 2.190  0.400 - 4.20 uIU/mL Final    Anion Gap 03/22/2018 16.0  8.0 - 16.0 meq/L Final    Est, Glom Filt Rate 03/22/2018 > 90  ml/min/1.73m2 Final    SCAN OF BLOOD SMEAR 03/22/2018 see below   Final         Narrative   PROCEDURE: US HEAD NECK SOFT TISSUE THYROID       CLINICAL INFORMATION: Neck mass .       COMPARISON: No prior study.       TECHNIQUE: Grayscale and color Doppler ultrasound.       FINDINGS: Large right thyroid lobe which contains a 3.5 cm heterogeneous isoechoic and sonolucent mass with the minimal associated vascularity. Mixed density 7 mm ovoid hypoechoic lesion superior mid left thyroid lobe. This lesion is in the intermediate   suspicion category.       Right Thyroid - 5.6 x 1.8 x 2.1 cm   Left Thyroid -4.5 x 1.7 x 1.2 cm   Isthmus -0.2 cm               Impression   3.5 cm mixed density lesion right thyroid lobe. By size criteria alone this fulfills criteria for ultrasound-guided fine-needle aspiration biopsy. 7 mm intermediate suspicion ARIANA category nodule left thyroid lobe. This does not fulfill criteria for biopsy and six-month follow-up is recommended for that lesion.                   **This report has been created using voice recognition software.  It may contain minor errors which are inherent in voice recognition technology. **       Final report electronically signed by Dr. Justin Pool on 3/22/2018 11:10 AM       Narrative   PROCEDURE: US ASP/INJ THYROID CYST       CLINICAL INFORMATION: Mass of thyroid region.       COMPARISON: Ultrasound of the thyroid dated March 22, 2018.       CONSENT: The risks, benefits and alternatives of the procedure were discussed with the patient. Verbal and signed informed consent was obtained.        TIMEOUT: A timeout was performed to confirm the correct patient, procedure and site prior to performing this procedure.        LOCALIZATION: With the patient in the supine position, the target nodule was identified by ultrasound. A skin site was marked and the skin was prepped and draped in the usual sterile fashion.  Local anesthesia was obtained with 2% local lidocaine.       LOCATION: A heterogeneous nodule within the superior right thyroid lobe measuring 3.6 x 2.5 x 2.5 cm was targeted for biopsy.       NEEDLE(s): 25-gauge fine-needle       FINDINGS:       2 needle passes were performed of the target lesion within the superior right thyroid lobe under ultrasound guidance. A cytopathologist was present on-site to determine the sample to be adequate.           Impression       Status post ultrasound-guided fine needle aspiration of a nodule within the right thyroid.                   **This report has been created using voice recognition software. It may contain minor errors which are inherent in voice recognition technology. **       Final report electronically signed by Dr. Elli Ross on 3/28/2018 4:01 PM         ASSESSMENT & PLAN  1. Thyroid nodule    Benign  However, causing pain, so will have her see ENT to see if partial thyroidectomy would be a reasonable option. Labs neg  If no surgery indicated, will repeat thyroid US in 6 months, she is in the call-back cherry Kruse MD    2. Painful thyroid    As above  con't prn motrin    - Yany Kruse MD    3. Epiploic appendagitis    Improved  Doing better  Returned to work  Incision healed well. 4. Hot flashes    Likely d/t healing process  Labs ok  Recommend monitor sxs and f/u in 4 to 6 wks if symptoms do not kulwinder    5. Morbid obesity ([facility] [address]    Discussed wt loss strategies. DISPOSITION    Return if symptoms worsen or fail to improve. Aziza Gonzalez released without restrictions. PATIENT COUNSELING    Patient given educational materials on: See Attached    Barriers to learning and self management: none    Discussed use, benefit, and side effects of prescribed medications. Barriers to medication compliance addressed. All patient questions answered. Pt voiced understanding.        Electronically signed by Wolf Barnes DO on 3/29/2018 at 4:14 PM

## 2018-04-23 ENCOUNTER — TELEPHONE (OUTPATIENT)
Dept: FAMILY MEDICINE CLINIC | Age: 38
End: 2018-04-23

## 2018-04-27 ENCOUNTER — TELEPHONE (OUTPATIENT)
Dept: ENT CLINIC | Age: 38
End: 2018-04-27

## 2018-04-30 ENCOUNTER — TELEPHONE (OUTPATIENT)
Dept: FAMILY MEDICINE CLINIC | Age: 38
End: 2018-04-30

## 2018-05-05 ENCOUNTER — HOSPITAL ENCOUNTER (EMERGENCY)
Age: 38
Discharge: HOME | End: 2018-05-05
Payer: COMMERCIAL

## 2018-05-05 ENCOUNTER — HOSPITAL ENCOUNTER (EMERGENCY)
Dept: GENERAL RADIOLOGY | Age: 38
Discharge: HOME | End: 2018-05-05
Payer: COMMERCIAL

## 2018-05-05 VITALS
TEMPERATURE: 98.2 F | OXYGEN SATURATION: 98 % | DIASTOLIC BLOOD PRESSURE: 98 MMHG | HEART RATE: 75 BPM | WEIGHT: 293 LBS | SYSTOLIC BLOOD PRESSURE: 168 MMHG | RESPIRATION RATE: 16 BRPM | BODY MASS INDEX: 41.56 KG/M2

## 2018-05-05 DIAGNOSIS — Z98.890 HX OF ARTHROSCOPY OF RIGHT KNEE: ICD-10-CM

## 2018-05-05 DIAGNOSIS — M25.561 ACUTE PAIN OF RIGHT KNEE: Primary | ICD-10-CM

## 2018-05-05 PROCEDURE — 99214 OFFICE O/P EST MOD 30 MIN: CPT

## 2018-05-05 PROCEDURE — 73564 X-RAY EXAM KNEE 4 OR MORE: CPT

## 2018-05-05 PROCEDURE — 99213 OFFICE O/P EST LOW 20 MIN: CPT | Performed by: NURSE PRACTITIONER

## 2018-05-05 RX ORDER — METHYLPREDNISOLONE 4 MG/1
TABLET ORAL
Qty: 1 KIT | Refills: 0 | Status: SHIPPED | OUTPATIENT
Start: 2018-05-05 | End: 2018-05-11

## 2018-05-05 ASSESSMENT — PAIN SCALES - GENERAL: PAINLEVEL_OUTOF10: 6

## 2018-05-05 ASSESSMENT — ENCOUNTER SYMPTOMS
STRIDOR: 0
ALLERGIC/IMMUNOLOGIC NEGATIVE: 1
EYES NEGATIVE: 1
CHEST TIGHTNESS: 0
GASTROINTESTINAL NEGATIVE: 1
COUGH: 0

## 2018-05-05 ASSESSMENT — PAIN DESCRIPTION - DESCRIPTORS: DESCRIPTORS: ACHING

## 2018-05-05 ASSESSMENT — PAIN DESCRIPTION - PAIN TYPE: TYPE: ACUTE PAIN

## 2018-05-05 ASSESSMENT — PAIN DESCRIPTION - ORIENTATION: ORIENTATION: RIGHT

## 2018-05-05 ASSESSMENT — PAIN DESCRIPTION - LOCATION: LOCATION: LEG

## 2018-05-14 ENCOUNTER — HOSPITAL ENCOUNTER (EMERGENCY)
Age: 38
Discharge: HOME | End: 2018-05-14
Payer: COMMERCIAL

## 2018-05-14 ENCOUNTER — APPOINTMENT (OUTPATIENT)
Dept: GENERAL RADIOLOGY | Age: 38
End: 2018-05-14
Payer: COMMERCIAL

## 2018-05-14 VITALS
SYSTOLIC BLOOD PRESSURE: 148 MMHG | DIASTOLIC BLOOD PRESSURE: 98 MMHG | HEIGHT: 71 IN | TEMPERATURE: 98.5 F | RESPIRATION RATE: 16 BRPM | OXYGEN SATURATION: 98 % | BODY MASS INDEX: 39.2 KG/M2 | WEIGHT: 280 LBS | HEART RATE: 91 BPM

## 2018-05-14 DIAGNOSIS — S61.217A LACERATION OF LEFT LITTLE FINGER WITHOUT FOREIGN BODY WITHOUT DAMAGE TO NAIL, INITIAL ENCOUNTER: Primary | ICD-10-CM

## 2018-05-14 PROCEDURE — 90715 TDAP VACCINE 7 YRS/> IM: CPT | Performed by: STUDENT IN AN ORGANIZED HEALTH CARE EDUCATION/TRAINING PROGRAM

## 2018-05-14 PROCEDURE — 99283 EMERGENCY DEPT VISIT LOW MDM: CPT

## 2018-05-14 PROCEDURE — 90471 IMMUNIZATION ADMIN: CPT | Performed by: STUDENT IN AN ORGANIZED HEALTH CARE EDUCATION/TRAINING PROGRAM

## 2018-05-14 PROCEDURE — 6360000002 HC RX W HCPCS: Performed by: STUDENT IN AN ORGANIZED HEALTH CARE EDUCATION/TRAINING PROGRAM

## 2018-05-14 PROCEDURE — 6370000000 HC RX 637 (ALT 250 FOR IP): Performed by: STUDENT IN AN ORGANIZED HEALTH CARE EDUCATION/TRAINING PROGRAM

## 2018-05-14 PROCEDURE — 73140 X-RAY EXAM OF FINGER(S): CPT

## 2018-05-14 PROCEDURE — 12001 RPR S/N/AX/GEN/TRNK 2.5CM/<: CPT

## 2018-05-14 RX ORDER — IBUPROFEN 200 MG
TABLET ORAL ONCE
Status: COMPLETED | OUTPATIENT
Start: 2018-05-14 | End: 2018-05-14

## 2018-05-14 RX ORDER — LIDOCAINE HYDROCHLORIDE 10 MG/ML
5 INJECTION, SOLUTION INFILTRATION; PERINEURAL ONCE
Status: DISCONTINUED | OUTPATIENT
Start: 2018-05-14 | End: 2018-05-14 | Stop reason: HOSPADM

## 2018-05-14 RX ORDER — CEPHALEXIN 500 MG/1
500 CAPSULE ORAL 4 TIMES DAILY
Qty: 28 CAPSULE | Refills: 0 | Status: SHIPPED | OUTPATIENT
Start: 2018-05-14 | End: 2018-05-21

## 2018-05-14 RX ADMIN — TETANUS TOXOID, REDUCED DIPHTHERIA TOXOID AND ACELLULAR PERTUSSIS VACCINE, ADSORBED 0.5 ML: 5; 2.5; 8; 8; 2.5 SUSPENSION INTRAMUSCULAR at 11:14

## 2018-05-14 RX ADMIN — NEOMYCIN AND POLYMYXIN B SULFATES AND BACITRACIN ZINC: 400; 3.5; 5 OINTMENT TOPICAL at 12:18

## 2018-05-14 ASSESSMENT — ENCOUNTER SYMPTOMS
COUGH: 0
CONSTIPATION: 0
SORE THROAT: 0
SHORTNESS OF BREATH: 0
BACK PAIN: 0
CHEST TIGHTNESS: 0
WHEEZING: 0
NAUSEA: 0
DIARRHEA: 0
BLOOD IN STOOL: 0
RHINORRHEA: 0
VOMITING: 0
ABDOMINAL PAIN: 0

## 2018-05-14 ASSESSMENT — PAIN DESCRIPTION - PAIN TYPE: TYPE: ACUTE PAIN

## 2018-05-14 ASSESSMENT — PAIN SCALES - GENERAL: PAINLEVEL_OUTOF10: 5

## 2018-05-14 ASSESSMENT — PAIN DESCRIPTION - ORIENTATION: ORIENTATION: LEFT

## 2018-05-14 ASSESSMENT — PAIN DESCRIPTION - LOCATION: LOCATION: FINGER (COMMENT WHICH ONE)

## 2018-05-16 ENCOUNTER — HOSPITAL ENCOUNTER (EMERGENCY)
Age: 38
Discharge: HOME | End: 2018-05-16
Attending: FAMILY MEDICINE
Payer: COMMERCIAL

## 2018-05-16 ENCOUNTER — APPOINTMENT (OUTPATIENT)
Dept: GENERAL RADIOLOGY | Age: 38
End: 2018-05-16
Payer: COMMERCIAL

## 2018-05-16 VITALS
OXYGEN SATURATION: 99 % | RESPIRATION RATE: 18 BRPM | SYSTOLIC BLOOD PRESSURE: 133 MMHG | HEART RATE: 86 BPM | DIASTOLIC BLOOD PRESSURE: 81 MMHG | TEMPERATURE: 98.1 F | HEIGHT: 71 IN | BODY MASS INDEX: 39.2 KG/M2 | WEIGHT: 280 LBS

## 2018-05-16 DIAGNOSIS — S93.402A SPRAIN OF LEFT ANKLE, UNSPECIFIED LIGAMENT, INITIAL ENCOUNTER: Primary | ICD-10-CM

## 2018-05-16 PROCEDURE — 73610 X-RAY EXAM OF ANKLE: CPT

## 2018-05-16 PROCEDURE — 99283 EMERGENCY DEPT VISIT LOW MDM: CPT

## 2018-05-16 ASSESSMENT — ENCOUNTER SYMPTOMS
WHEEZING: 0
COUGH: 0
ABDOMINAL PAIN: 0
NAUSEA: 0
VOMITING: 0
BACK PAIN: 0
EYE DISCHARGE: 0
RHINORRHEA: 0
SHORTNESS OF BREATH: 0
DIARRHEA: 0
EYE PAIN: 0
SORE THROAT: 0

## 2018-05-16 ASSESSMENT — PAIN SCALES - GENERAL: PAINLEVEL_OUTOF10: 8

## 2018-05-16 ASSESSMENT — PAIN DESCRIPTION - ORIENTATION: ORIENTATION: LEFT

## 2018-05-16 ASSESSMENT — PAIN DESCRIPTION - PAIN TYPE: TYPE: ACUTE PAIN

## 2018-05-16 ASSESSMENT — PAIN DESCRIPTION - LOCATION: LOCATION: ANKLE

## 2018-05-16 ASSESSMENT — PAIN DESCRIPTION - PROGRESSION: CLINICAL_PROGRESSION: NOT CHANGED

## 2018-05-16 ASSESSMENT — PAIN DESCRIPTION - DESCRIPTORS: DESCRIPTORS: CONSTANT;SHARP

## 2018-05-16 ASSESSMENT — PAIN DESCRIPTION - ONSET: ONSET: SUDDEN

## 2018-05-16 ASSESSMENT — PAIN DESCRIPTION - FREQUENCY: FREQUENCY: CONTINUOUS

## 2018-08-27 ENCOUNTER — TELEPHONE (OUTPATIENT)
Dept: FAMILY MEDICINE CLINIC | Age: 38
End: 2018-08-27

## 2018-08-27 DIAGNOSIS — E27.8 MASS OF BOTH ADRENAL GLANDS (HCC): ICD-10-CM

## 2018-08-27 DIAGNOSIS — E04.2 MULTIPLE THYROID NODULES: Primary | ICD-10-CM

## 2018-08-29 NOTE — TELEPHONE ENCOUNTER
Pt scheduled for US thyroid and CT Abd wo contrast at [place] [facility] on 9/12/18 at 1:40, pt to be in [facility] at 1:10. Pt's  informed.   (on signed HIPAA)

## 2018-09-12 ENCOUNTER — HOSPITAL ENCOUNTER (OUTPATIENT)
Dept: ULTRASOUND IMAGING | Age: 38
Discharge: HOME | End: 2018-09-12
Payer: COMMERCIAL

## 2018-09-12 ENCOUNTER — HOSPITAL ENCOUNTER (OUTPATIENT)
Dept: CT IMAGING | Age: 38
Discharge: HOME | End: 2018-09-12
Payer: COMMERCIAL

## 2018-09-12 DIAGNOSIS — E04.2 MULTIPLE THYROID NODULES: ICD-10-CM

## 2018-09-12 DIAGNOSIS — E27.8 MASS OF BOTH ADRENAL GLANDS (HCC): ICD-10-CM

## 2018-09-12 PROCEDURE — 74176 CT ABD & PELVIS W/O CONTRAST: CPT

## 2018-09-12 PROCEDURE — 76536 US EXAM OF HEAD AND NECK: CPT

## 2018-09-13 ENCOUNTER — TELEPHONE (OUTPATIENT)
Dept: FAMILY MEDICINE CLINIC | Age: 38
End: 2018-09-13

## 2018-09-13 NOTE — TELEPHONE ENCOUNTER
----- Message from Carlos Navarro DO sent at 9/13/2018  6:40 AM EDT -----  Please let pt know that thyroid US showed that nodule that was previously biopsied why slightly larger than before, but overall stable. The nodule on the L side of the gland appears stable. They did also see a new enlarged lymph node on the R side of the neck that was not there on prior ultrasound. This may just be reacting to fighting off an infection, the radiologist could not tell for sure. Has she been having any URI type symptoms, cough, cold, sore throat or fevers? Let me know. As for CT of abdomen, that looked stable, no concerning findings. Did show some constipation, but otherwise fine. Let me know if questions, thanks!

## 2018-09-19 PROBLEM — E04.2 MULTIPLE THYROID NODULES: Status: ACTIVE | Noted: 2018-09-19

## 2018-09-19 NOTE — PROGRESS NOTES
ABDOMEN N/A 3/9/2018    DIAGNOSTIC LAPAROSCOPY, APPENDECTOMY, infarcted appendix plancha performed by Fareed Neri MD at [address] [place] [address] Street  age 19    endometriosis.  TONSILLECTOMY         Allergies   Allergen Reactions    Tape Twilla Leda Tape] Rash       Social History     Social History    Marital status:      Spouse name: N/A    Number of children: N/A    Years of education: N/A     Occupational History    Not on file. Social History Main Topics    Smoking status: Never Smoker    Smokeless tobacco: Never Used    Alcohol use No    Drug use: No    Sexual activity: Yes     Partners: Male     Other Topics Concern    Not on file     Social History Narrative    No narrative on file       Family History   Problem Relation Age of Onset    Colon Cancer Paternal Grandmother 80    Breast Cancer Maternal Grandmother 80         I have reviewed the patient's past medical history, past surgical history, allergies, medications, social and family history and I have made updates where appropriate.       Review of Systems  Positive responses are highlighted in bold    Constitutional:  Fever, Chills, Night Sweats, Fatigue, Unexpected changes in weight  HENT:  Ear pain, Tinnitus, Nosebleeds, Trouble swallowing, Hearing loss, Sore throat  Cardiovascular:  Chest Pain, Palpitations, Orthopnea, Paroxysmal Nocturnal Dyspnea  Respiratory:  Cough, Wheezing, Shortness of breath, Chest tightness, Apnea  Gastrointestinal:  Nausea, Vomiting, Diarrhea, Constipation, Heartburn, Blood in stool  Genitourinary:  Difficulty or painful urination, Flank pain, Change in frequency, Urgency  Skin:  Color change, Rash, Itching, Wound  Musculoskeletal:  Joint pain, Back pain, Gait problems, Joint swelling, Myalgias  Neurological:  Dizziness, Headaches, Presyncope, Numbness, Seizures, Tremors  Endocrine:  Heat Intolerance, Cold Intolerance, Polydipsia, Polyphagia, Polyuria      PHYSICAL EXAM:  Vitals:    09/20/18 1423 BP: 128/74   Pulse: 62   Resp: 14   Temp: 97.9 °F (36.6 °C)   Weight: 292 lb 9.6 oz (132.7 kg)   Height: 5' 11\" (1.803 m)     Body mass index is 40.81 kg/m². Pain Score:   2 (R Anterior neck)    VS Reviewed  General Appearance: A&O x 3, No acute distress,well developed and well- nourished  Eyes: pupils equal, round, and reactive to light, extraocular eye movements intact, conjunctivae and eye lids without erythema  ENT: external ear and ear canal clear bilaterally, TMs intact and regular, nose without deformity, nasal mucosa and turbinates normal without polyps, oropharynx normal, dentition is normal for age  Neck: supple and now tender mass on R anterior part of neck feels a bit larger than before, no thyromegaly. Pulmonary/Chest: clear to auscultation bilaterally- no wheezes, rales or rhonchi, normal air movement, no respiratory distress or retractions  Cardiovascular: S1 and S2 auscultated w/ RRR. No murmurs, rubs, clicks, or gallops, distal pulses intact. Abdomen: soft, non-tender, non-distended, bowl sounds physiologic,  no rebound or guarding, no masses or hernias noted. Liver and spleen without enlargement. Extremities: no cyanosis, clubbing or edema of the lower extremities. Skin: warm and dry, no rash or erythema. 0.8 cm x 0.8 cm pedunculated pigmented papule on lower back.        3/29/2018  2:33 PM - Elsa Lin Nine Incoming Lab Results From Soft     Narrative     [facility]      ASHISH SORENSEN                   [id number]  Assoc.                                              Page 1 of 1  [facility]  [address], [place] [address]                                                      PROC: 2018  [facility]                                    RECV: 2018  [address]                                    RPTD: 2018  [address], [place] [address]                      MRN:  [id number]    LOC:                       ACCT: [de-identified]  SEX: F                      : 1980  AGE: 37 Y                         PATHOLOGY REPORT                      ATTN: Nelly Zayas                      REQ: Nelly Zayas       Copies To:   PHILLIP MAC      Clinical Information:  RIGHT THYROID NODULE, 3.5 CM SUB POLE COMPLEX    SOURCE:  A) FINE NEEDLE ASPIRATE THYROID, RIGHT    Source Description:                 Materials Prepared & Examined:                                      Smear Slides. .............. 4  Color. .......... Pink               Monolayers. ............... Theadore Karina 1  Other. ............... Theadore Karina Less than 1   ml        RAPID ONSITE EVALUATION:  #8-1: Follicular cells, adequate. FINAL RESULTS:     BENIGN.     Benign follicular nodule, consistent with colloid nodule. Narrative   PROCEDURE: US HEAD NECK SOFT TISSUE THYROID       CLINICAL INFORMATION: Multiple thyroid nodules.       COMPARISON: Ultrasound of the thyroid dated March 22, 2018.       TECHNIQUE: Ultrasound images of the thyroid gland were obtained with a linear transducer.       FINDINGS:       The right thyroid lobe is at the upper limits of normal measuring 5.7 x 2.0 x 1.8 cm. There is redemonstration of a partially cystic nodule within the superior aspect which measures 3.6 x 3.1 x 2.7 cm. This appears stable to slightly increased in size    compared to the prior exam.       The left thyroid lobe is within normal limits and measures 4.3 x 1.6 x 1.0 cm. There is a hypoechoic nodule within the posterior aspect of the superior left thyroid which measures 0.8 x 0.4 x 0.4 cm. This appears stable compared to the prior exam.       The thyroid isthmus measures 0.2 cm and is unremarkable in appearance.       There is an enlarged lymph node within the right lateral neck which measures 2.2 x 0.9 x 0.9 cm. It contains a fatty hilum however, is slightly rounded. An enlarged lymph node within the left neck measures 1.6 x 0.6 x 0.3 cm and also contains a fatty    hilum. These were not visualized on the prior exam.           Impression       1.  There is an enlarged lymph node within the right lateral neck which measures up to 2.2 cm. It contains a fatty hilum however, is slightly rounded. This may be reactive however, further clinical correlation would be beneficial. Ultrasound-guided biopsy    may be obtained, if clinically warranted. A mildly enlarged lymph node within the left neck contains a fatty hilum and may be reactive.       2. There is redemonstration of a large, partially cystic nodule within the right thyroid lobe which was noted to be previously biopsied. This is slightly increased in size compared to the prior exam. Further clinical correlation with biopsy results would    be beneficial.       3. There is a stable, hypoechoic nodule within the posterior aspect of the superior left thyroid measuring up to 0.8 cm. This is of intermediate suspicion based on American thyroid Association guidelines. Follow-up ultrasound at 12-24 months is    recommended.                   **This report has been created using voice recognition software. It may contain minor errors which are inherent in voice recognition technology. **       Final report electronically signed by Dr. Refugio Hernandez on 9/12/2018 3:37 PM       Narrative   CT ABDOMEN AND PELVIS WITHOUT CONTRAST ENHANCEMENT:       CLINICAL INFORMATION: Mass of both adrenal glands [facility])       COMPARISON: 3/5/2018       TECHNIQUE: Multiple axial 5 mm images of the abdomen, pelvis, and lung bases were obtained without the administration of oral or intravenous contrast material. Computer generated sagittal and coronal images of the abdomen and pelvis were also    reconstructed. ALL CT SCANS AT THIS FACILITY use dose modulation, iterative reconstruction, and/or weight-based dosing when appropriate to reduce radiation dose to as low as reasonably achievable.           FINDINGS:        Lung bases: Unremarkable. No infiltrates. No effusions. No lung nodules.       Liver: Liver unremarkable.  Gallbladder unremarkable

## 2018-09-20 ENCOUNTER — OFFICE VISIT (OUTPATIENT)
Dept: FAMILY MEDICINE CLINIC | Age: 38
End: 2018-09-20
Payer: COMMERCIAL

## 2018-09-20 ENCOUNTER — TELEPHONE (OUTPATIENT)
Dept: FAMILY MEDICINE CLINIC | Age: 38
End: 2018-09-20

## 2018-09-20 VITALS
DIASTOLIC BLOOD PRESSURE: 74 MMHG | HEIGHT: 71 IN | WEIGHT: 292.6 LBS | TEMPERATURE: 97.9 F | HEART RATE: 62 BPM | BODY MASS INDEX: 40.96 KG/M2 | RESPIRATION RATE: 14 BRPM | SYSTOLIC BLOOD PRESSURE: 128 MMHG

## 2018-09-20 DIAGNOSIS — E04.2 MULTIPLE THYROID NODULES: ICD-10-CM

## 2018-09-20 DIAGNOSIS — E07.89 PAINFUL THYROID: ICD-10-CM

## 2018-09-20 DIAGNOSIS — R59.0 CERVICAL LYMPHADENOPATHY: Primary | ICD-10-CM

## 2018-09-20 DIAGNOSIS — Z23 NEED FOR INFLUENZA VACCINATION: ICD-10-CM

## 2018-09-20 DIAGNOSIS — E66.01 MORBID OBESITY (HCC): ICD-10-CM

## 2018-09-20 DIAGNOSIS — D35.00 ADRENAL ADENOMA, UNSPECIFIED LATERALITY: Chronic | ICD-10-CM

## 2018-09-20 DIAGNOSIS — D22.9 MELANOCYTIC NEVUS, UNSPECIFIED LOCATION: ICD-10-CM

## 2018-09-20 PROBLEM — Z98.890 S/P LAPAROSCOPY: Status: RESOLVED | Noted: 2018-03-09 | Resolved: 2018-09-20

## 2018-09-20 PROCEDURE — 90471 IMMUNIZATION ADMIN: CPT | Performed by: FAMILY MEDICINE

## 2018-09-20 PROCEDURE — 90686 IIV4 VACC NO PRSV 0.5 ML IM: CPT | Performed by: FAMILY MEDICINE

## 2018-09-20 PROCEDURE — 99214 OFFICE O/P EST MOD 30 MIN: CPT | Performed by: FAMILY MEDICINE

## 2018-09-20 NOTE — TELEPHONE ENCOUNTER
Pt needing needle guided biopsy cervical right sided. Called specials at [phone] and left msg to call our office back to scheduled. They close at 4:00 pm.  They requested order to be faxed to them at [phone] and they will call our office back next day to schedule. Order faxed. Order in phone #2 tray.

## 2018-09-20 NOTE — PATIENT INSTRUCTIONS
Motrin), or naproxen (Aleve). Read and follow all instructions on the label. · Do not take two or more pain medicines at the same time unless the doctor told you to. Many pain medicines have acetaminophen, which is Tylenol. Too much acetaminophen (Tylenol) can be harmful. When should you call for help? Call your doctor now or seek immediate medical care if:    · You have worse symptoms of infection, such as:  ¨ Increased pain, swelling, warmth, or redness. ¨ Red streaks leading from the area. ¨ Pus draining from the area. ¨ A fever.    Watch closely for changes in your health, and be sure to contact your doctor if:    · Your lymph nodes do not get smaller or do not return to normal.     · You do not get better as expected. Where can you learn more? Go to [email or web address]. org and sign in to your [employer] account. Enter Z476 in the iPeen box to learn more about \"Swollen Lymph Nodes: Care Instructions. \"     If you do not have an account, please click on the \"Sign Up Now\" link. Current as of: November 18, 2017  Content Version: 11.7  © 0812-9570 [employer]. Care instructions adapted under license by [facility] ([facility]). If you have questions about a medical condition or this instruction, always ask your healthcare professional. [address] any warranty or liability for your use of this information.

## 2018-09-26 ENCOUNTER — TELEPHONE (OUTPATIENT)
Dept: FAMILY MEDICINE CLINIC | Age: 38
End: 2018-09-26

## 2018-09-26 ENCOUNTER — HOSPITAL ENCOUNTER (OUTPATIENT)
Dept: ULTRASOUND IMAGING | Age: 38
Discharge: HOME | End: 2018-09-26
Payer: COMMERCIAL

## 2018-09-26 ENCOUNTER — NURSE ONLY (OUTPATIENT)
Dept: LAB | Age: 38
End: 2018-09-26

## 2018-09-26 DIAGNOSIS — R59.0 CERVICAL LYMPHADENOPATHY: ICD-10-CM

## 2018-09-26 DIAGNOSIS — E04.2 MULTIPLE THYROID NODULES: ICD-10-CM

## 2018-09-26 DIAGNOSIS — E07.89 PAINFUL THYROID: ICD-10-CM

## 2018-09-26 LAB
ALBUMIN SERPL-MCNC: 4.5 G/DL (ref 3.5–5.1)
ALP BLD-CCNC: 98 U/L (ref 38–126)
ALT SERPL-CCNC: 22 U/L (ref 11–66)
ANION GAP SERPL CALCULATED.3IONS-SCNC: 14 MEQ/L (ref 8–16)
AST SERPL-CCNC: 19 U/L (ref 5–40)
BASOPHILS # BLD: 0.3 %
BASOPHILS ABSOLUTE: 0 THOU/MM3 (ref 0–0.1)
BILIRUB SERPL-MCNC: 0.3 MG/DL (ref 0.3–1.2)
BUN BLDV-MCNC: 18 MG/DL (ref 7–22)
CALCIUM SERPL-MCNC: 10 MG/DL (ref 8.5–10.5)
CHLORIDE BLD-SCNC: 103 MEQ/L (ref 98–111)
CO2: 25 MEQ/L (ref 23–33)
CREAT SERPL-MCNC: 0.7 MG/DL (ref 0.4–1.2)
EOSINOPHIL # BLD: 1.9 %
EOSINOPHILS ABSOLUTE: 0.2 THOU/MM3 (ref 0–0.4)
ERYTHROCYTE [DISTWIDTH] IN BLOOD BY AUTOMATED COUNT: 13.2 % (ref 11.5–14.5)
ERYTHROCYTE [DISTWIDTH] IN BLOOD BY AUTOMATED COUNT: 40.3 FL (ref 35–45)
GFR SERPL CREATININE-BSD FRML MDRD: > 90 ML/MIN/1.73M2
GLUCOSE BLD-MCNC: 88 MG/DL (ref 70–108)
HCT VFR BLD CALC: 43.1 % (ref 37–47)
HEMOGLOBIN: 13.9 GM/DL (ref 12–16)
IMMATURE GRANS (ABS): 0.06 THOU/MM3 (ref 0–0.07)
IMMATURE GRANULOCYTES: 0.6 %
LYMPHOCYTES # BLD: 26.7 %
LYMPHOCYTES ABSOLUTE: 2.5 THOU/MM3 (ref 1–4.8)
MCH RBC QN AUTO: 27.6 PG (ref 26–33)
MCHC RBC AUTO-ENTMCNC: 32.3 GM/DL (ref 32.2–35.5)
MCV RBC AUTO: 85.5 FL (ref 81–99)
MONOCYTES # BLD: 4.8 %
MONOCYTES ABSOLUTE: 0.4 THOU/MM3 (ref 0.4–1.3)
NUCLEATED RED BLOOD CELLS: 0 /100 WBC
PLATELET # BLD: 232 THOU/MM3 (ref 130–400)
PMV BLD AUTO: 11.5 FL (ref 9.4–12.4)
POTASSIUM SERPL-SCNC: 4.7 MEQ/L (ref 3.5–5.2)
RBC # BLD: 5.04 MILL/MM3 (ref 4.2–5.4)
SEG NEUTROPHILS: 65.7 %
SEGMENTED NEUTROPHILS ABSOLUTE COUNT: 6 THOU/MM3 (ref 1.8–7.7)
SODIUM BLD-SCNC: 142 MEQ/L (ref 135–145)
TOTAL PROTEIN: 8 G/DL (ref 6.1–8)
TSH SERPL DL<=0.05 MIU/L-ACNC: 1.38 UIU/ML (ref 0.4–4.2)
WBC # BLD: 9.2 THOU/MM3 (ref 4.8–10.8)

## 2018-09-26 PROCEDURE — 60300 ASPIR/INJ THYROID CYST: CPT

## 2018-09-26 PROCEDURE — 88173 CYTOPATH EVAL FNA REPORT: CPT

## 2018-09-26 PROCEDURE — 88185 FLOWCYTOMETRY/TC ADD-ON: CPT

## 2018-09-26 PROCEDURE — 88172 CYTP DX EVAL FNA 1ST EA SITE: CPT

## 2018-09-26 PROCEDURE — 88184 FLOWCYTOMETRY/ TC 1 MARKER: CPT

## 2018-09-26 PROCEDURE — 38505 NEEDLE BIOPSY LYMPH NODES: CPT

## 2018-09-26 NOTE — TELEPHONE ENCOUNTER
----- Message from Adi Carias DO sent at 9/26/2018  3:59 PM EDT -----  Please let pt know that blood work is WNL. Will call with biopsy results once they are available. Let me know if questions, thanks!

## 2018-09-28 LAB — LEUKEMIA/LYMPHOMA PHENOTYPING MISC: NORMAL

## 2018-10-01 ENCOUNTER — TELEPHONE (OUTPATIENT)
Dept: FAMILY MEDICINE CLINIC | Age: 38
End: 2018-10-01

## 2018-10-01 DIAGNOSIS — I88.9 CERVICAL LYMPHADENITIS: ICD-10-CM

## 2018-10-01 DIAGNOSIS — J01.90 ACUTE RHINOSINUSITIS: Primary | ICD-10-CM

## 2018-10-01 RX ORDER — AMOXICILLIN AND CLAVULANATE POTASSIUM 875; 125 MG/1; MG/1
1 TABLET, FILM COATED ORAL 2 TIMES DAILY
Qty: 28 TABLET | Refills: 0 | Status: SHIPPED | OUTPATIENT
Start: 2018-10-01 | End: 2018-10-15

## 2018-10-17 NOTE — PROGRESS NOTES
(Throat)    VS Reviewed  General Appearance: A&O x 3, No acute distress,well developed and well- nourished  Eyes: pupils equal, round, and reactive to light, extraocular eye movements intact, conjunctivae and eye lids without erythema  ENT: external ear and ear canal clear bilaterally, TMs intact and regular, nose without deformity, nasal mucosa and turbinates normal without polyps, oropharynx normal, dentition is normal for age  Neck: supple and now tender mass on R anterior part of neck feels a bit larger than before, no thyromegaly. Pulmonary/Chest: clear to auscultation bilaterally- no wheezes, rales or rhonchi, normal air movement, no respiratory distress or retractions  Cardiovascular: S1 and S2 auscultated w/ RRR. No murmurs, rubs, clicks, or gallops, distal pulses intact. Abdomen: soft, non-tender, non-distended, bowl sounds physiologic,  no rebound or guarding, no masses or hernias noted. Liver and spleen without enlargement. Extremities: no cyanosis, clubbing or edema of the lower extremities. Skin: warm and dry, no rash or erythema.         2018  1:06 PM - Frankie Lin Incoming Lab Results From Soft     Narrative     [facility]      ASHISH SORENSEN                   [id number]  Assoc.                                              Page 1 of 1  [address]  [place] [address], [place] [address]                                                      PROC: 2018  [facility]                                    RECV: 2018  [address]                                    RPTD: 2018  [address], [place] [address]                      MRN:  [id number]    LOC: US                      ACCT: [de-identified]  SEX: F                      : 1980  AGE: 38 Y                         PATHOLOGY REPORT                      ATTN: TAISHA GARCIA                      REQ: TAISHA GARCIA       Copies To:   ELOY COLMENARES      Clinical Information:  RIGHT THYROID NODULE; THYROID, RIGHT, SOLITARY,  SOLID, CYSTIC, 3.6 X 3.1 X 2.7 CM    SOURCE:  A) FINE NEEDLE ASPIRATE THYROID, RIGHT    Source Description:                 Materials Prepared & Examined:                                      Smear Slides. .............. 4  Color. ......... Lorne Even Red                Monolayers. ............... Lorne Even 1  Consistency. .. Lorne Even Lorne Even Lorne Even Thin  Other. ................ Cloudy,  less than 1 ml added to fixative        RAPID ONSITE EVALUATION:   #1 - Adequate follicular cells.   #2 - Paps  only.   SMW      FINAL RESULTS:     BENIGN.                         Abundant colloid and rare follicular cells                          consistent with colloid nodule.       Limited cellularity. 2018 10:00 AM - Riley, Daksha Montana Incoming Lab Results From Soft     Narrative     [facility]      ASHISH SORENSEN                   [id number]  Assoc.                                              Page 1 of 1 [address]  [place], [place] [address]                                                      PROC: 2018  [facility]                                    RECV: 2018  [address]                                    RPTD: 2018  [address] [place], [place] [address]                      MRN:  [id number]    LOC: US                      ACCT: [de-identified]  SEX: F                      : 1980  AGE: 34 Y                         PATHOLOGY REPORT                      ATTN: TAISHA GARCIA                      REQ: TAISHA GARCIA       Copies To:   ELOY COLMENARES      Clinical Information:  CERVICAL LYMPHADENOPATHY; LYMPH NODE, RIGHT,  NECK, 2.2 CM    SOURCE:  A) FINE NEEDLE ASPIRATE, RIGHT NECK LYMPH NODE    Source Description:                 Materials Prepared & Examined:                                      Smear Slides. .............. 2  Color. .......... Pink               Monolayers. ............... Lorne Even 1  Consistency. .. Lorne Even Lorne Even Lorne Even Thin  Other. ............... Lorne Even Less than 1   ml added to fixative        RAPID ONSITE EVALUATION:  #1. Lymphoid sample.  SMW      FINAL RESULTS:   BENIGN.     Polymorphous lymphocyte

## 2018-10-18 ENCOUNTER — TELEPHONE (OUTPATIENT)
Dept: ENT CLINIC | Age: 38
End: 2018-10-18

## 2018-10-18 ENCOUNTER — OFFICE VISIT (OUTPATIENT)
Dept: FAMILY MEDICINE CLINIC | Age: 38
End: 2018-10-18
Payer: COMMERCIAL

## 2018-10-18 VITALS
RESPIRATION RATE: 16 BRPM | BODY MASS INDEX: 40.21 KG/M2 | HEART RATE: 80 BPM | SYSTOLIC BLOOD PRESSURE: 130 MMHG | WEIGHT: 287.2 LBS | DIASTOLIC BLOOD PRESSURE: 78 MMHG | HEIGHT: 71 IN | TEMPERATURE: 98 F

## 2018-10-18 DIAGNOSIS — R07.0 THROAT PAIN: ICD-10-CM

## 2018-10-18 DIAGNOSIS — E04.2 MULTIPLE THYROID NODULES: ICD-10-CM

## 2018-10-18 DIAGNOSIS — R59.0 CERVICAL LYMPHADENOPATHY: Primary | ICD-10-CM

## 2018-10-18 DIAGNOSIS — E07.89 PAINFUL THYROID: ICD-10-CM

## 2018-10-18 PROBLEM — D22.9 MELANOCYTIC NEVUS: Status: RESOLVED | Noted: 2018-09-20 | Resolved: 2018-10-18

## 2018-10-18 PROCEDURE — 99213 OFFICE O/P EST LOW 20 MIN: CPT | Performed by: FAMILY MEDICINE

## 2018-12-06 ENCOUNTER — OFFICE VISIT (OUTPATIENT)
Dept: ENT CLINIC | Age: 38
End: 2018-12-06
Payer: COMMERCIAL

## 2018-12-06 VITALS
WEIGHT: 290 LBS | SYSTOLIC BLOOD PRESSURE: 130 MMHG | DIASTOLIC BLOOD PRESSURE: 90 MMHG | HEART RATE: 88 BPM | BODY MASS INDEX: 40.6 KG/M2 | RESPIRATION RATE: 14 BRPM | HEIGHT: 71 IN | TEMPERATURE: 98 F

## 2018-12-06 DIAGNOSIS — M54.2 CERVICALGIA: Primary | ICD-10-CM

## 2018-12-06 DIAGNOSIS — E04.2 MULTIPLE THYROID NODULES: ICD-10-CM

## 2018-12-06 DIAGNOSIS — G90.01 CAROTIDYNIA: ICD-10-CM

## 2018-12-06 PROCEDURE — 99243 OFF/OP CNSLTJ NEW/EST LOW 30: CPT | Performed by: OTOLARYNGOLOGY

## 2018-12-06 RX ORDER — METHYLPREDNISOLONE 4 MG/1
TABLET ORAL
Qty: 1 KIT | Refills: 0 | Status: SHIPPED | OUTPATIENT
Start: 2018-12-06 | End: 2018-12-12

## 2018-12-06 ASSESSMENT — ENCOUNTER SYMPTOMS
SORE THROAT: 0
CHEST TIGHTNESS: 0
VOICE CHANGE: 0
TROUBLE SWALLOWING: 0
WHEEZING: 0
CHOKING: 0
COLOR CHANGE: 0
SHORTNESS OF BREATH: 0
RHINORRHEA: 0
APNEA: 0
FACIAL SWELLING: 0
DIARRHEA: 0
ABDOMINAL PAIN: 0
VOMITING: 0
NAUSEA: 0
SINUS PRESSURE: 0
COUGH: 0
STRIDOR: 0

## 2019-01-22 ENCOUNTER — OFFICE VISIT (OUTPATIENT)
Dept: FAMILY MEDICINE CLINIC | Age: 39
End: 2019-01-22
Payer: COMMERCIAL

## 2019-01-22 ENCOUNTER — TELEPHONE (OUTPATIENT)
Dept: FAMILY MEDICINE CLINIC | Age: 39
End: 2019-01-22

## 2019-01-22 ENCOUNTER — HOSPITAL ENCOUNTER (OUTPATIENT)
Age: 39
Discharge: HOME | End: 2019-01-22
Payer: COMMERCIAL

## 2019-01-22 ENCOUNTER — HOSPITAL ENCOUNTER (OUTPATIENT)
Dept: GENERAL RADIOLOGY | Age: 39
Discharge: HOME | End: 2019-01-22
Payer: COMMERCIAL

## 2019-01-22 VITALS
RESPIRATION RATE: 16 BRPM | SYSTOLIC BLOOD PRESSURE: 120 MMHG | HEIGHT: 70 IN | HEART RATE: 73 BPM | DIASTOLIC BLOOD PRESSURE: 78 MMHG | WEIGHT: 292 LBS | BODY MASS INDEX: 41.8 KG/M2 | TEMPERATURE: 97.9 F

## 2019-01-22 DIAGNOSIS — R07.0 THROAT PAIN: ICD-10-CM

## 2019-01-22 DIAGNOSIS — R59.0 CERVICAL LYMPHADENOPATHY: ICD-10-CM

## 2019-01-22 DIAGNOSIS — E04.2 MULTIPLE THYROID NODULES: ICD-10-CM

## 2019-01-22 DIAGNOSIS — E66.01 MORBID OBESITY (HCC): ICD-10-CM

## 2019-01-22 DIAGNOSIS — J40 BRONCHITIS: Primary | ICD-10-CM

## 2019-01-22 DIAGNOSIS — J40 BRONCHITIS: ICD-10-CM

## 2019-01-22 DIAGNOSIS — E07.89 PAINFUL THYROID: ICD-10-CM

## 2019-01-22 PROCEDURE — 71046 X-RAY EXAM CHEST 2 VIEWS: CPT

## 2019-01-22 PROCEDURE — 99214 OFFICE O/P EST MOD 30 MIN: CPT | Performed by: FAMILY MEDICINE

## 2019-01-22 RX ORDER — BENZONATATE 100 MG/1
CAPSULE ORAL
Qty: 60 CAPSULE | Refills: 0 | Status: SHIPPED | OUTPATIENT
Start: 2019-01-22 | End: 2019-02-01

## 2019-01-22 RX ORDER — ALBUTEROL SULFATE 90 UG/1
2 AEROSOL, METERED RESPIRATORY (INHALATION) EVERY 6 HOURS PRN
Qty: 1 INHALER | Refills: 0 | Status: SHIPPED | OUTPATIENT
Start: 2019-01-22 | End: 2019-02-12 | Stop reason: SDUPTHER

## 2019-01-22 RX ORDER — DOXYCYCLINE HYCLATE 100 MG/1
100 CAPSULE ORAL 2 TIMES DAILY
Qty: 20 CAPSULE | Refills: 0 | Status: SHIPPED | OUTPATIENT
Start: 2019-01-22 | End: 2019-02-01

## 2019-01-22 RX ORDER — PREDNISONE 20 MG/1
40 TABLET ORAL DAILY
Qty: 10 TABLET | Refills: 0 | Status: SHIPPED | OUTPATIENT
Start: 2019-01-22 | End: 2019-01-27

## 2019-01-29 ENCOUNTER — HOSPITAL ENCOUNTER (OUTPATIENT)
Dept: ULTRASOUND IMAGING | Age: 39
Discharge: HOME | End: 2019-01-29
Payer: COMMERCIAL

## 2019-01-29 DIAGNOSIS — E04.2 MULTIPLE THYROID NODULES: ICD-10-CM

## 2019-01-29 DIAGNOSIS — R07.0 THROAT PAIN: ICD-10-CM

## 2019-01-29 DIAGNOSIS — E07.89 PAINFUL THYROID: ICD-10-CM

## 2019-01-29 DIAGNOSIS — R59.0 CERVICAL LYMPHADENOPATHY: ICD-10-CM

## 2019-01-29 PROCEDURE — 76536 US EXAM OF HEAD AND NECK: CPT

## 2019-01-30 ENCOUNTER — TELEPHONE (OUTPATIENT)
Dept: FAMILY MEDICINE CLINIC | Age: 39
End: 2019-01-30

## 2019-02-12 DIAGNOSIS — J40 BRONCHITIS: ICD-10-CM

## 2019-02-19 ENCOUNTER — NURSE TRIAGE (OUTPATIENT)
Dept: ADMINISTRATIVE | Age: 39
End: 2019-02-19

## 2019-04-04 ENCOUNTER — TELEPHONE (OUTPATIENT)
Dept: FAMILY MEDICINE CLINIC | Age: 39
End: 2019-04-04

## 2019-04-04 NOTE — TELEPHONE ENCOUNTER
Pt came into the ofc w/FMLA paperwork stating our ofc had referred her to ENT (Dr Gino Muhammad) & she tried to have Dr Gino Muhammad fill the paperwork out for the times she's been seen in the ENT ofc as well as intermittent FMLA to cover her for any future ENT appts/any time she needs to take off due to the pain involved w/this issue. Pt states Dr Gino Muhammad told her she does not do FMLA because she does not believe in it. Pt states Dr Gino Muhammad went on to state she would only complete the paperwork in the event the pt was to have surgery. Pt states she has plenty of sick time at work to cover the appts she's already had as well as any in the future but she states whenever she needs time off her employer requests verification of the medical necessity of the time off. Pt states she would just feel better if she knew her job was covered by [employer]. Pt is requesting our ofc to complete the FMLA paperwork for intermittent leave to cover any future time she would need off for this issue. Please advise.

## 2019-04-04 NOTE — TELEPHONE ENCOUNTER
Pt was notified & she stated she really isn't sure how much time she'll need per mo. Pt will return to the ofc w/the forms & will complete the ofc forms sometime this evening, will let you know once the forms have been brought back in.

## 2019-04-09 ENCOUNTER — TELEPHONE (OUTPATIENT)
Dept: FAMILY MEDICINE CLINIC | Age: 39
End: 2019-04-09

## 2019-04-09 NOTE — TELEPHONE ENCOUNTER
Pt was notified our ofc has completed/faxed/scanned her FMLA paperwork & she can  her originals any time during ofc hrs. Paperwork was placed at the  for .

## 2019-10-28 ENCOUNTER — OFFICE VISIT (OUTPATIENT)
Dept: FAMILY MEDICINE CLINIC | Age: 39
End: 2019-10-28
Payer: COMMERCIAL

## 2019-10-28 VITALS
WEIGHT: 293 LBS | HEART RATE: 83 BPM | DIASTOLIC BLOOD PRESSURE: 94 MMHG | TEMPERATURE: 98.1 F | BODY MASS INDEX: 41.95 KG/M2 | SYSTOLIC BLOOD PRESSURE: 156 MMHG | RESPIRATION RATE: 15 BRPM | HEIGHT: 70 IN

## 2019-10-28 DIAGNOSIS — Z23 NEEDS FLU SHOT: ICD-10-CM

## 2019-10-28 DIAGNOSIS — R03.0 SINGLE EPISODE OF ELEVATED BLOOD PRESSURE: ICD-10-CM

## 2019-10-28 DIAGNOSIS — E04.2 MULTIPLE THYROID NODULES: Primary | ICD-10-CM

## 2019-10-28 DIAGNOSIS — E66.01 MORBID OBESITY (HCC): Chronic | ICD-10-CM

## 2019-10-28 DIAGNOSIS — E89.0 S/P PARTIAL THYROIDECTOMY: ICD-10-CM

## 2019-10-28 PROCEDURE — 90686 IIV4 VACC NO PRSV 0.5 ML IM: CPT | Performed by: FAMILY MEDICINE

## 2019-10-28 PROCEDURE — 99214 OFFICE O/P EST MOD 30 MIN: CPT | Performed by: FAMILY MEDICINE

## 2019-10-28 PROCEDURE — 90471 IMMUNIZATION ADMIN: CPT | Performed by: FAMILY MEDICINE

## 2019-10-28 RX ORDER — ACETAMINOPHEN 500 MG
500 TABLET ORAL EVERY 6 HOURS PRN
COMMUNITY
End: 2019-12-09

## 2019-10-28 ASSESSMENT — PATIENT HEALTH QUESTIONNAIRE - PHQ9
2. FEELING DOWN, DEPRESSED OR HOPELESS: 0
SUM OF ALL RESPONSES TO PHQ QUESTIONS 1-9: 0
SUM OF ALL RESPONSES TO PHQ QUESTIONS 1-9: 0
SUM OF ALL RESPONSES TO PHQ9 QUESTIONS 1 & 2: 0
1. LITTLE INTEREST OR PLEASURE IN DOING THINGS: 0

## 2019-11-06 ENCOUNTER — NURSE ONLY (OUTPATIENT)
Dept: LAB | Age: 39
End: 2019-11-06

## 2019-11-06 DIAGNOSIS — E89.0 S/P PARTIAL THYROIDECTOMY: ICD-10-CM

## 2019-11-06 DIAGNOSIS — E89.0 POSTOPERATIVE HYPOTHYROIDISM: Primary | Chronic | ICD-10-CM

## 2019-11-06 DIAGNOSIS — E04.2 MULTIPLE THYROID NODULES: ICD-10-CM

## 2019-11-06 DIAGNOSIS — R03.0 SINGLE EPISODE OF ELEVATED BLOOD PRESSURE: ICD-10-CM

## 2019-11-06 LAB
ALBUMIN SERPL-MCNC: 4.1 G/DL (ref 3.5–5.1)
ALP BLD-CCNC: 99 U/L (ref 38–126)
ALT SERPL-CCNC: 19 U/L (ref 11–66)
ANION GAP SERPL CALCULATED.3IONS-SCNC: 15 MEQ/L (ref 8–16)
AST SERPL-CCNC: 15 U/L (ref 5–40)
BASOPHILS # BLD: 0.5 %
BASOPHILS ABSOLUTE: 0 THOU/MM3 (ref 0–0.1)
BILIRUB SERPL-MCNC: 0.3 MG/DL (ref 0.3–1.2)
BUN BLDV-MCNC: 17 MG/DL (ref 7–22)
CALCIUM SERPL-MCNC: 9.3 MG/DL (ref 8.5–10.5)
CHLORIDE BLD-SCNC: 104 MEQ/L (ref 98–111)
CHOLESTEROL, TOTAL: 200 MG/DL (ref 100–199)
CO2: 24 MEQ/L (ref 23–33)
CREAT SERPL-MCNC: 0.8 MG/DL (ref 0.4–1.2)
CREATININE, URINE: 144.3 MG/DL
EOSINOPHIL # BLD: 3 %
EOSINOPHILS ABSOLUTE: 0.3 THOU/MM3 (ref 0–0.4)
ERYTHROCYTE [DISTWIDTH] IN BLOOD BY AUTOMATED COUNT: 13.4 % (ref 11.5–14.5)
ERYTHROCYTE [DISTWIDTH] IN BLOOD BY AUTOMATED COUNT: 42.1 FL (ref 35–45)
GFR SERPL CREATININE-BSD FRML MDRD: 79 ML/MIN/1.73M2
GLUCOSE BLD-MCNC: 95 MG/DL (ref 70–108)
HCT VFR BLD CALC: 42.6 % (ref 37–47)
HDLC SERPL-MCNC: 48 MG/DL
HEMOGLOBIN: 13.2 GM/DL (ref 12–16)
IMMATURE GRANS (ABS): 0.05 THOU/MM3 (ref 0–0.07)
IMMATURE GRANULOCYTES: 0.6 %
LDL CHOLESTEROL CALCULATED: 128 MG/DL
LYMPHOCYTES # BLD: 28.5 %
LYMPHOCYTES ABSOLUTE: 2.4 THOU/MM3 (ref 1–4.8)
MCH RBC QN AUTO: 26.9 PG (ref 26–33)
MCHC RBC AUTO-ENTMCNC: 31 GM/DL (ref 32.2–35.5)
MCV RBC AUTO: 86.9 FL (ref 81–99)
MICROALBUMIN UR-MCNC: < 1.2 MG/DL
MICROALBUMIN/CREAT UR-RTO: 8 MG/G (ref 0–30)
MONOCYTES # BLD: 5.6 %
MONOCYTES ABSOLUTE: 0.5 THOU/MM3 (ref 0.4–1.3)
NUCLEATED RED BLOOD CELLS: 0 /100 WBC
PLATELET # BLD: 204 THOU/MM3 (ref 130–400)
PMV BLD AUTO: 11.6 FL (ref 9.4–12.4)
POTASSIUM SERPL-SCNC: 3.9 MEQ/L (ref 3.5–5.2)
RBC # BLD: 4.9 MILL/MM3 (ref 4.2–5.4)
SEG NEUTROPHILS: 61.8 %
SEGMENTED NEUTROPHILS ABSOLUTE COUNT: 5.2 THOU/MM3 (ref 1.8–7.7)
SODIUM BLD-SCNC: 143 MEQ/L (ref 135–145)
T4 FREE: 1.12 NG/DL (ref 0.93–1.76)
TOTAL PROTEIN: 7.6 G/DL (ref 6.1–8)
TRIGL SERPL-MCNC: 122 MG/DL (ref 0–199)
TSH SERPL DL<=0.05 MIU/L-ACNC: 4.94 UIU/ML (ref 0.4–4.2)
WBC # BLD: 8.4 THOU/MM3 (ref 4.8–10.8)

## 2019-11-07 ENCOUNTER — TELEPHONE (OUTPATIENT)
Dept: FAMILY MEDICINE CLINIC | Age: 39
End: 2019-11-07

## 2019-11-07 RX ORDER — LEVOTHYROXINE SODIUM 0.03 MG/1
25 TABLET ORAL DAILY
Qty: 30 TABLET | Refills: 2 | Status: SHIPPED | OUTPATIENT
Start: 2019-11-07 | End: 2020-01-07 | Stop reason: SDUPTHER

## 2019-11-11 ENCOUNTER — OFFICE VISIT (OUTPATIENT)
Dept: FAMILY MEDICINE CLINIC | Age: 39
End: 2019-11-11
Payer: COMMERCIAL

## 2019-11-11 VITALS
SYSTOLIC BLOOD PRESSURE: 148 MMHG | TEMPERATURE: 97.9 F | DIASTOLIC BLOOD PRESSURE: 92 MMHG | HEART RATE: 98 BPM | WEIGHT: 292 LBS | BODY MASS INDEX: 43.25 KG/M2 | RESPIRATION RATE: 16 BRPM | HEIGHT: 69 IN

## 2019-11-11 DIAGNOSIS — T39.95XA ANALGESIC REBOUND HEADACHE: ICD-10-CM

## 2019-11-11 DIAGNOSIS — I10 HYPERTENSION, ESSENTIAL: ICD-10-CM

## 2019-11-11 DIAGNOSIS — E89.0 POSTOPERATIVE HYPOTHYROIDISM: ICD-10-CM

## 2019-11-11 DIAGNOSIS — R51.9 CHRONIC DAILY HEADACHE: ICD-10-CM

## 2019-11-11 DIAGNOSIS — E04.2 MULTIPLE THYROID NODULES: Primary | ICD-10-CM

## 2019-11-11 DIAGNOSIS — E89.0 S/P PARTIAL THYROIDECTOMY: ICD-10-CM

## 2019-11-11 DIAGNOSIS — G44.40 ANALGESIC REBOUND HEADACHE: ICD-10-CM

## 2019-11-11 PROCEDURE — 99214 OFFICE O/P EST MOD 30 MIN: CPT | Performed by: FAMILY MEDICINE

## 2019-11-11 RX ORDER — AMLODIPINE BESYLATE 5 MG/1
5 TABLET ORAL DAILY
Qty: 30 TABLET | Refills: 3 | Status: SHIPPED | OUTPATIENT
Start: 2019-11-11 | End: 2019-12-17 | Stop reason: DRUGHIGH

## 2019-12-09 ENCOUNTER — OFFICE VISIT (OUTPATIENT)
Dept: FAMILY MEDICINE CLINIC | Age: 39
End: 2019-12-09
Payer: COMMERCIAL

## 2019-12-09 VITALS
BODY MASS INDEX: 41.02 KG/M2 | RESPIRATION RATE: 14 BRPM | TEMPERATURE: 97.9 F | HEART RATE: 78 BPM | HEIGHT: 71 IN | SYSTOLIC BLOOD PRESSURE: 148 MMHG | WEIGHT: 293 LBS | DIASTOLIC BLOOD PRESSURE: 84 MMHG

## 2019-12-09 DIAGNOSIS — E89.0 S/P PARTIAL THYROIDECTOMY: ICD-10-CM

## 2019-12-09 DIAGNOSIS — E04.2 MULTIPLE THYROID NODULES: Primary | ICD-10-CM

## 2019-12-09 DIAGNOSIS — R07.81 RIB PAIN ON RIGHT SIDE: ICD-10-CM

## 2019-12-09 DIAGNOSIS — T39.95XA ANALGESIC REBOUND HEADACHE: ICD-10-CM

## 2019-12-09 DIAGNOSIS — I10 HYPERTENSION, ESSENTIAL: ICD-10-CM

## 2019-12-09 DIAGNOSIS — G44.40 ANALGESIC REBOUND HEADACHE: ICD-10-CM

## 2019-12-09 DIAGNOSIS — R51.9 CHRONIC DAILY HEADACHE: ICD-10-CM

## 2019-12-09 DIAGNOSIS — E89.0 POSTOPERATIVE HYPOTHYROIDISM: ICD-10-CM

## 2019-12-09 PROCEDURE — 99214 OFFICE O/P EST MOD 30 MIN: CPT | Performed by: FAMILY MEDICINE

## 2019-12-15 ENCOUNTER — TELEPHONE (OUTPATIENT)
Dept: FAMILY MEDICINE CLINIC | Age: 39
End: 2019-12-15

## 2019-12-15 DIAGNOSIS — I10 HYPERTENSION, ESSENTIAL: Primary | ICD-10-CM

## 2019-12-17 RX ORDER — AMLODIPINE BESYLATE 10 MG/1
10 TABLET ORAL DAILY
Qty: 90 TABLET | Refills: 1 | Status: SHIPPED | OUTPATIENT
Start: 2019-12-17 | End: 2020-07-13

## 2019-12-31 ENCOUNTER — HOSPITAL ENCOUNTER (OUTPATIENT)
Age: 39
Discharge: HOME | End: 2019-12-31
Payer: COMMERCIAL

## 2019-12-31 ENCOUNTER — TELEPHONE (OUTPATIENT)
Dept: FAMILY MEDICINE CLINIC | Age: 39
End: 2019-12-31

## 2019-12-31 LAB — TSH SERPL DL<=0.05 MIU/L-ACNC: 3.07 UIU/ML (ref 0.4–4.2)

## 2019-12-31 PROCEDURE — 84443 ASSAY THYROID STIM HORMONE: CPT

## 2019-12-31 PROCEDURE — 36415 COLL VENOUS BLD VENIPUNCTURE: CPT

## 2020-01-06 PROBLEM — E07.89 PAINFUL THYROID: Status: RESOLVED | Noted: 2018-03-29 | Resolved: 2020-01-06

## 2020-01-06 PROBLEM — R59.0 CERVICAL LYMPHADENOPATHY: Status: RESOLVED | Noted: 2018-09-20 | Resolved: 2020-01-06

## 2020-01-06 NOTE — PROGRESS NOTES
Chief Complaint   Patient presents with    Follow-up     as noted below       History obtained from the patient. SUBJECTIVE:  Xena Kuhn is a 37 y.o.  that presents today for     -PRIOR VISIT:  [facility]  D/c summary:  None available  Pathology was benign  Doing ok post-op  Ready to RTW this wk, but with restriction  Needs f/u labs  States can't work full time yet  Typically has to 16 hour shifts, 64 hours/wk  Wants 8 hour shifts, 5 days a week d/t fatigue from surgery  Throat a little sore yet    UPDATE PRIOR VISIT:   Doing ok overall  Not ready to go to full duty yet  Also noted that she can't be exposed to pepper spray d/t causing some throat issues. She needs restriction for that. Needs paperwork updated  Same restrictions as above, plus pepper spray  Had labs done, all ok other than inc TSH  Some fatigue     UPDATE LAST VISIT:   Still overall doing ok  Still not ready to return to full duty, she thinks  Throat still a bit scratchy, so she's worried about exposure to pepper spray  Still can't full raise her voice  Needs same restrictions as before extended  On synthroid yet, tolerating ok, fatigue doing a bit better  Will get labs in a few wks     UPDATE TODAY:   Doing better  Throat a bit scratchy, but no pain  Return to RTW, full duty  Tolerating synthroid  Last TSH was WNL       -BP PRIOR VISIT:  No hx of HTN  High since surgery  Typically no hx of HTN  140s/90s  No CP/SOB  High today    UPDATE PRIOR VISIT:   BPs 140-150/ since last visit  No CP/SOB  Strong fam hx  Labs ok. Willing to start meds     UPDATE LAST VISIT:   Started on norvasc last visit  Pt hasn't checked BP's once since starting  High on presentation today to office  No side effects from norvasc  No CP/SOB  No leg swelling. UPDATE TODAY:   On inc dose of norvasc, 10mg  Denies CP/SOB  Tolerating well.        -Chest wall pain LAST VISIT:  2 wks ago woke up Trouble swallowing, Hearing loss, Sore throat  Cardiovascular:  Chest Pain, Palpitations, Orthopnea, Paroxysmal Nocturnal Dyspnea  Respiratory:  Cough, Wheezing, Shortness of breath, Chest tightness, Apnea  Gastrointestinal:  Nausea, Vomiting, Diarrhea, Constipation, Heartburn, Blood in stool  Genitourinary:  Difficulty or painful urination, Flank pain, Change in frequency, Urgency  Skin:  Color change, Rash, Itching, Wound  Musculoskeletal:  Joint pain, Back pain, Gait problems, Joint swelling, Myalgias  Neurological:  Dizziness, Headaches, Presyncope, Numbness, Seizures, Tremors  Endocrine:  Heat Intolerance, Cold Intolerance, Polydipsia, Polyphagia, Polyuria      PHYSICAL EXAM:  Vitals:    01/07/20 0802   BP: 132/80   Pulse: 79   Resp: 12   Temp: 97.8 °F (36.6 °C)   TempSrc: Oral   Weight: 292 lb (132.5 kg)   Height: 5' 10.5\" (1.791 m)     Body mass index is 41.31 kg/m². Pain Score:   0 - No pain    VS Reviewed  General Appearance: A&O x 3, No acute distress,well developed and well- nourished  Eyes: pupils equal, round, and reactive to light, extraocular eye movements intact, conjunctivae and eye lids without erythema  ENT: external ear and ear canal clear bilaterally, TMs intact and regular, nose without deformity, nasal mucosa and turbinates normal without polyps, oropharynx normal, dentition is normal for age  Neck: supple and non-tender without mass, no thyromegaly or thyroid nodules, no cervical lymphadenopathy. Anterior incision healing well, no cellulitic chnages  Pulmonary/Chest: clear to auscultation bilaterally- no wheezes, rales or rhonchi, normal air movement, no respiratory distress or retractions  Cardiovascular: S1 and S2 auscultated w/ RRR. No murmurs, rubs, clicks, or gallops, distal pulses intact. Abdomen: soft, non-tender, non-distended, bowl sounds physiologic,  no rebound or guarding, no masses or hernias noted. Liver and spleen without enlargement.    Extremities: no cyanosis, clubbing or edema of the lower extremities. Skin: warm and dry, no rash or erythema      ASSESSMENT & PLAN  1. Multiple thyroid nodules    con't to do well  Ok to lift work restrictions on voice and exposure to pepper spray  F/u any changes    2. S/P partial thyroidectomy      3. Hypothyroidism, postoperative    Stable  TSH WNL  con't synthroid at present dose  Repeat TSH 6 months    - levothyroxine (SYNTHROID) 25 MCG tablet; Take 1 tablet by mouth daily  Dispense: 90 tablet; Refill: 3  - TSH with Reflex; Future    4. Hypertension, essential    At goal  con't meds  Labs UTD    5. Rib pain on right side    Improved  F/u prn    6. Morbid obesity ([address]    Discussed wt loss strategies      DISPOSITION    Return in about 1 year (around 1/7/2021) for follow-up on chronic medical conditions, sooner as needed. Adina Hensonia released without restrictions    PATIENT COUNSELING    Adina Lopes received counseling on the following healthy behaviors: nutrition, exercise, medication adherence    Patient given educational materials on: See Attached    I have instructed Adina Lopes to complete a self tracking handout on Blood Pressures  and instructed them to bring it with them to her next appointment. Barriers to learning and self management: none    Discussed use, benefit, and side effects of prescribed medications. Barriers to medication compliance addressed. All patient questions answered. Pt voiced understanding.        Electronically signed by Nikko Wiseman DO on 1/7/2020 at 8:23 AM

## 2020-01-07 ENCOUNTER — OFFICE VISIT (OUTPATIENT)
Dept: FAMILY MEDICINE CLINIC | Age: 40
End: 2020-01-07
Payer: COMMERCIAL

## 2020-01-07 VITALS
BODY MASS INDEX: 40.88 KG/M2 | TEMPERATURE: 97.8 F | HEART RATE: 79 BPM | DIASTOLIC BLOOD PRESSURE: 80 MMHG | WEIGHT: 292 LBS | HEIGHT: 71 IN | SYSTOLIC BLOOD PRESSURE: 132 MMHG | RESPIRATION RATE: 12 BRPM

## 2020-01-07 PROCEDURE — 99214 OFFICE O/P EST MOD 30 MIN: CPT | Performed by: FAMILY MEDICINE

## 2020-01-07 RX ORDER — LEVOTHYROXINE SODIUM 0.03 MG/1
25 TABLET ORAL DAILY
Qty: 90 TABLET | Refills: 3 | Status: SHIPPED | OUTPATIENT
Start: 2020-01-07 | End: 2020-02-07

## 2020-01-07 ASSESSMENT — PATIENT HEALTH QUESTIONNAIRE - PHQ9
2. FEELING DOWN, DEPRESSED OR HOPELESS: 0
SUM OF ALL RESPONSES TO PHQ QUESTIONS 1-9: 0
1. LITTLE INTEREST OR PLEASURE IN DOING THINGS: 0
SUM OF ALL RESPONSES TO PHQ9 QUESTIONS 1 & 2: 0
SUM OF ALL RESPONSES TO PHQ QUESTIONS 1-9: 0

## 2020-01-07 NOTE — PATIENT INSTRUCTIONS
may need to see the doctor more often at first or until your blood pressure comes down. · If you are taking blood pressure medicine, talk to your doctor before you take decongestants or anti-inflammatory medicine, such as ibuprofen. Some of these medicines can raise blood pressure. · Learn how to check your blood pressure at home. Lifestyle changes  · Stay at a healthy weight. This is especially important if you put on weight around the waist. Losing even 10 pounds can help you lower your blood pressure. · If your doctor recommends it, get more exercise. Walking is a good choice. Bit by bit, increase the amount you walk every day. Try for at least 30 minutes on most days of the week. You also may want to swim, bike, or do other activities. · Avoid or limit alcohol. Talk to your doctor about whether you can drink any alcohol. · Try to limit how much sodium you eat to less than 2,300 milligrams (mg) a day. Your doctor may ask you to try to eat less than 1,500 mg a day. · Eat plenty of fruits (such as bananas and oranges), vegetables, legumes, whole grains, and low-fat dairy products. · Lower the amount of saturated fat in your diet. Saturated fat is found in animal products such as milk, cheese, and meat. Limiting these foods may help you lose weight and also lower your risk for heart disease. · Do not smoke. Smoking increases your risk for heart attack and stroke. If you need help quitting, talk to your doctor about stop-smoking programs and medicines. These can increase your chances of quitting for good. When should you call for help? Call 911 anytime you think you may need emergency care. This may mean having symptoms that suggest that your blood pressure is causing a serious heart or blood vessel problem. Your blood pressure may be over 180/120.   For example, call 911 if:    · You have symptoms of a heart attack. These may include:  ?  Chest pain or pressure, or a strange feeling in the chest.  ? Sweating. ? Shortness of breath. ? Nausea or vomiting. ? Pain, pressure, or a strange feeling in the back, neck, jaw, or upper belly or in one or both shoulders or arms. ? Lightheadedness or sudden weakness. ? A fast or irregular heartbeat.     · You have symptoms of a stroke. These may include:  ? Sudden numbness, tingling, weakness, or loss of movement in your face, arm, or leg, especially on only one side of your body. ? Sudden vision changes. ? Sudden trouble speaking. ? Sudden confusion or trouble understanding simple statements. ? Sudden problems with walking or balance. ? A sudden, severe headache that is different from past headaches.     · You have severe back or belly pain.    Do not wait until your blood pressure comes down on its own. Get help right away.   Call your doctor now or seek immediate care if:    · Your blood pressure is much higher than normal (such as 180/120 or higher), but you don't have symptoms.     · You think high blood pressure is causing symptoms, such as:  ? Severe headache.  ? Blurry vision.    Watch closely for changes in your health, and be sure to contact your doctor if:    · Your blood pressure measures higher than your doctor recommends at least 2 times. That means the top number is higher or the bottom number is higher, or both.     · You think you may be having side effects from your blood pressure medicine. Where can you learn more? Go to [email or web address]://[email or web address].[email or web address] org and sign in to your [employer] account. Enter K366 in the [facility] box to learn more about \"High Blood Pressure: Care Instructions. \"     If you do not have an account, please click on the \"Sign Up Now\" link. Current as of: July 22, 2018  Content Version: 12.1  © 4965-4916 [employer]. Care instructions adapted under license by [facility] ([facility]).  If you have questions about a medical condition or this instruction, always ask your healthcare professional.

## 2020-01-23 ENCOUNTER — TELEPHONE (OUTPATIENT)
Dept: FAMILY MEDICINE CLINIC | Age: 40
End: 2020-01-23

## 2020-01-23 ENCOUNTER — HOSPITAL ENCOUNTER (OUTPATIENT)
Age: 40
Discharge: HOME | End: 2020-01-23
Payer: COMMERCIAL

## 2020-01-23 ENCOUNTER — HOSPITAL ENCOUNTER (OUTPATIENT)
Dept: GENERAL RADIOLOGY | Age: 40
Discharge: HOME | End: 2020-01-23
Payer: COMMERCIAL

## 2020-01-23 ENCOUNTER — OFFICE VISIT (OUTPATIENT)
Dept: FAMILY MEDICINE CLINIC | Age: 40
End: 2020-01-23
Payer: COMMERCIAL

## 2020-01-23 VITALS
SYSTOLIC BLOOD PRESSURE: 122 MMHG | HEART RATE: 68 BPM | RESPIRATION RATE: 16 BRPM | DIASTOLIC BLOOD PRESSURE: 82 MMHG | BODY MASS INDEX: 40.68 KG/M2 | TEMPERATURE: 98 F | WEIGHT: 290.6 LBS | HEIGHT: 71 IN

## 2020-01-23 PROCEDURE — 99213 OFFICE O/P EST LOW 20 MIN: CPT | Performed by: FAMILY MEDICINE

## 2020-01-23 PROCEDURE — 96372 THER/PROPH/DIAG INJ SC/IM: CPT | Performed by: FAMILY MEDICINE

## 2020-01-23 PROCEDURE — 71100 X-RAY EXAM RIBS UNI 2 VIEWS: CPT

## 2020-01-23 RX ORDER — KETOROLAC TROMETHAMINE 30 MG/ML
60 INJECTION, SOLUTION INTRAMUSCULAR; INTRAVENOUS ONCE
Status: COMPLETED | OUTPATIENT
Start: 2020-01-23 | End: 2020-01-23

## 2020-01-23 RX ORDER — TIZANIDINE 4 MG/1
4 TABLET ORAL EVERY 8 HOURS PRN
Qty: 30 TABLET | Refills: 0 | Status: SHIPPED | OUTPATIENT
Start: 2020-01-23 | End: 2020-01-30

## 2020-01-23 RX ADMIN — KETOROLAC TROMETHAMINE 60 MG: 30 INJECTION, SOLUTION INTRAMUSCULAR; INTRAVENOUS at 11:52

## 2020-01-23 NOTE — PROGRESS NOTES
Chief Complaint   Patient presents with    Rib Injury     R side       History obtained from the patient. SUBJECTIVE:  Bo Negro is a 37 y.o.  that presents today for     -Chest wall pain PRIOR VISIT:  2 wks ago woke up with sharp pain in R anterior lower ribs  Was worse 2 wks ago  Improved the last wk  Only comes and goes now  No pain today  Typically will only notice it when takes deep breath in  Overall feels improving  Not related to exertion  Was sharp, now dull and mild    UPDATE LAST VISIT:   Has improved  Only gets very occ at this point  Prefers to monitor  No SOB  No sxs, overall sig improved. UPDATE TODAY:   Had been doing well until last nigh  Pain recurred  Work her up  Lower R ribs, radiates around to the back  Moderate pain at rest  Worse with deep breath in  Severe last night  Doing better this AM, but still present  About a 4/10, 6 with deep breath  No abd pain  No SOB or cough  No trigger or injury  No rashes  Otherwise feels well    Age/Gender Health Maintenance    Lipid -   Lab Results   Component Value Date    CHOL 200 (H) 11/06/2019     Lab Results   Component Value Date    TRIG 122 11/06/2019     Lab Results   Component Value Date    HDL 48 11/06/2019     Lab Results   Component Value Date    LDLCALC 128 11/06/2019     No results found for: LABVLDL, VLDL  No results found for: CHOLHDLRATIO      DM Screen -   Lab Results   Component Value Date    GLUCOSE 95 11/06/2019     TSH -   Lab Results   Component Value Date    TSH 3.070 12/31/2019       Colon Cancer Screening - age 46  Lung Cancer Screening (Age 54 to [de-identified] with 30 pack year hx, current smoker or quit within past 15 years) - never smoker.      Tetanus - UTD 2007  Influenza Vaccine - UTD FALL 2019  Pneumonia Vaccine - age 70  Zoster - age 46     Breast Cancer Screening - wants to wait for now (OCT 2019)  Cervical Cancer Screening - hyst for benign reasons  Osteoporosis Screening - age 59      Current Outpatient Medications Medication Sig Dispense Refill    tiZANidine (ZANAFLEX) 4 MG tablet Take 1 tablet by mouth every 8 hours as needed (R sided rib pain/spasm) 30 tablet 0    levothyroxine (SYNTHROID) 25 MCG tablet Take 1 tablet by mouth daily 90 tablet 3    amLODIPine (NORVASC) 10 MG tablet Take 1 tablet by mouth daily 90 tablet 1     No current facility-administered medications for this visit. Orders Placed This Encounter   Medications    ketorolac (TORADOL) injection 60 mg    tiZANidine (ZANAFLEX) 4 MG tablet     Sig: Take 1 tablet by mouth every 8 hours as needed (R sided rib pain/spasm)     Dispense:  30 tablet     Refill:  0         All medications reviewed and reconciled, including OTC and herbal medications. Updated list given to patient. Patient Active Problem List   Diagnosis    Patellofemoral stress syndrome of right knee    Right knee meniscal tear    Osteochondral defect of patella, right    Morbid obesity ([address]    Multiple thyroid nodules    Adrenal adenoma; bilateral. Stable and benign on CT    Hypothyroidism, postoperative    Hypertension, essential    Chronic daily headache    Analgesic rebound headache    S/P partial thyroidectomy       Past Medical History:   Diagnosis Date    Adrenal adenoma; bilateral. Stable and benign on CT     Hypertension, essential 11/11/2019    Hypothyroidism, postoperative     Morbid obesity ([address]     S/P partial thyroidectomy 11/11/2019       Past Surgical History:   Procedure Laterality Date    BACK SURGERY      post aa    CARPAL TUNNEL RELEASE Right     DILATION AND CURETTAGE OF UTERUS      KNEE ARTHROSCOPY Right 11/05/2015    Medial Menesectomy with Chrondroplasty - Dr Jeb Macario N/A 3/9/2018    DIAGNOSTIC LAPAROSCOPY, APPENDECTOMY, infarcted appendix plancha performed by Irma Miranda MD at [address] [place] [address]  age 19    endometriosis.      TONSILLECTOMY         Allergies   Allergen Reactions    Tape Pippa Ma Tape] Rash       Social History     Tobacco Use    Smoking status: Never Smoker    Smokeless tobacco: Never Used   Substance Use Topics    Alcohol use: No       Family History   Problem Relation Age of Onset    Colon Cancer Paternal Grandmother 80    Breast Cancer Maternal Grandmother 72    High Blood Pressure Mother     Diabetes Father     Ovarian Cancer Neg Hx     Tuberculosis Neg Hx          I have reviewed the patient's past medical history, past surgical history, allergies, medications, social and family history and I have made updates where appropriate. Review of Systems  Positive responses are highlighted in bold    Constitutional:  Fever, Chills, Night Sweats, Fatigue, Unexpected changes in weight  HENT:  Ear pain, Tinnitus, Nosebleeds, Trouble swallowing, Hearing loss, Sore throat  Cardiovascular:  Chest Pain, Palpitations, Orthopnea, Paroxysmal Nocturnal Dyspnea  Respiratory:  Cough, Wheezing, Shortness of breath, Chest tightness, Apnea  Gastrointestinal:  Nausea, Vomiting, Diarrhea, Constipation, Heartburn, Blood in stool  Genitourinary:  Difficulty or painful urination, Flank pain, Change in frequency, Urgency  Skin:  Color change, Rash, Itching, Wound  Musculoskeletal:  Joint pain, Back pain, Gait problems, Joint swelling, Myalgias  Neurological:  Dizziness, Headaches, Presyncope, Numbness, Seizures, Tremors  Endocrine:  Heat Intolerance, Cold Intolerance, Polydipsia, Polyphagia, Polyuria      PHYSICAL EXAM:  Vitals:    01/23/20 1129   BP: 122/82   Pulse: 68   Resp: 16   Temp: 98 °F (36.7 °C)   TempSrc: Oral   Weight: 290 lb 9.6 oz (131.8 kg)   Height: 5' 10.5\" (1.791 m)     Body mass index is 41.11 kg/m².   Pain Score:   4(R lower ribs)    VS Reviewed  General Appearance: A&O x 3, No acute distress,well developed and well- nourished  Eyes: pupils equal, round, and reactive to light, extraocular eye movements intact, conjunctivae and eye lids without erythema  ENT: external ear and ear canal clear bilaterally, TMs intact and regular, nose without deformity, nasal mucosa and turbinates normal without polyps, oropharynx normal, dentition is normal for age  Neck: supple and non-tender without mass, no thyromegaly or thyroid nodules, no cervical lymphadenopathy. Anterior incision healing well, no cellulitic chnages  Pulmonary/Chest: clear to auscultation bilaterally- no wheezes, rales or rhonchi, normal air movement, no respiratory distress or retractions. + TTP on medial R 12th rib near xyvoid with decreased TTP along the length of that rib into the thoracic spine. Inc spasm and tissue tension along there. Cardiovascular: S1 and S2 auscultated w/ RRR. No murmurs, rubs, clicks, or gallops, distal pulses intact. Abdomen: soft, non-tender, non-distended, bowl sounds physiologic,  no rebound or guarding, no masses or hernias noted. Liver and spleen without enlargement. Extremities: no cyanosis, clubbing or edema of the lower extremities. Skin: warm and dry, no rash or erythema      ASSESSMENT & PLAN  1. Rib pain on right side    Toradol IM x1 with modest relief  Short course tizanadine  Warm compress/heating pad  Prn motrin  Xray rib  Off work today, RTW tomorrow  F/u if no better  Reviewed ER precautions, pt understands. - ketorolac (TORADOL) injection 60 mg  - tiZANidine (ZANAFLEX) 4 MG tablet; Take 1 tablet by mouth every 8 hours as needed (R sided rib pain/spasm)  Dispense: 30 tablet; Refill: 0  - XR RIBS RIGHT (2 VIEWS); Future      DISPOSITION    Return if symptoms worsen or fail to improve. Sandee released off work until tomorrow    [address]    Patient given educational materials on: See Attached    Barriers to learning and self management: none    Discussed use, benefit, and side effects of prescribed medications. Barriers to medication compliance addressed. All patient questions answered. Pt voiced understanding.        Electronically signed by Yee Leon DO on 1/23/2020 at 12:16 PM

## 2020-01-23 NOTE — PROGRESS NOTES
After obtaining consent, and per orders of Dr. Peña Gomez, injection of Ketorolac 60 mg was given IM in Right upper quad. gluteus by Dahlia Schmitz. Patient tolerated well and was instructed to remain in clinic for 20 minutes afterwards, and to report any adverse reaction to me immediately. Patient left office with no apparent reaction. Administrations This Visit     ketorolac (TORADOL) injection 60 mg     Admin Date  01/23/2020  11:52 Action  Given Dose  60 mg Route  Intramuscular Site  Dorsogluteal Right Administered By  Parul Topete    Ordering Provider:  DO Jocelyne Preston [address]:  [id number]    Lot#:  [id number]    :  Divya Moody    Patient Supplied?:  No    Comments:  Patient tolerated well. Given IM in Right Gluteal. Exp. 07/01/2020.

## 2020-01-23 NOTE — LETTER
[address] [facility]  [address] [facility] [facility]. [place] [address]  Phone: [phone]  Fax: [address] , DO      January 23, 2020    Patient: Bee Sawyer   YOB: 1980   Date of Visit: 1/23/2020       To Whom it May Concern:    Vania Justice was seen in my clinic on 1/23/2020. She should be off work today, and may return to work tomorrow [phone] without restrictions. If you have any questions or concerns, please don't hesitate to call.       Sincerely,         Jesse Ty, DO

## 2020-01-30 RX ORDER — TIZANIDINE 4 MG/1
TABLET ORAL
Qty: 30 TABLET | Refills: 0 | Status: SHIPPED | OUTPATIENT
Start: 2020-01-30 | End: 2020-02-10

## 2020-02-02 ENCOUNTER — TELEPHONE (OUTPATIENT)
Dept: FAMILY MEDICINE CLINIC | Age: 40
End: 2020-02-02

## 2020-02-07 RX ORDER — LEVOTHYROXINE SODIUM 0.03 MG/1
25 TABLET ORAL DAILY
Qty: 90 TABLET | Refills: 3 | Status: SHIPPED | OUTPATIENT
Start: 2020-02-07 | End: 2021-04-08 | Stop reason: SDUPTHER

## 2020-02-10 RX ORDER — TIZANIDINE 4 MG/1
TABLET ORAL
Qty: 30 TABLET | Refills: 0 | Status: SHIPPED | OUTPATIENT
Start: 2020-02-10 | End: 2020-08-20 | Stop reason: SDUPTHER

## 2020-02-20 ENCOUNTER — PATIENT MESSAGE (OUTPATIENT)
Dept: FAMILY MEDICINE CLINIC | Age: 40
End: 2020-02-20

## 2020-02-20 NOTE — TELEPHONE ENCOUNTER
From: Ki Harman  To: EMELY Montano  Sent: 2/19/2020 6:55 PM EST  Subject: RE:mammogram    I think it would be a good idea to do a mammogram. So please let me know what I need to do or go to get that set up. I also want to ask a few other questions. I've talked to the Dr about my right side having pain. The pain has gotten worse and the pills do not help. I've also been fighting a cold for the last 3 weeks on and off fever but the last three days my upper arabella has been numb. Any advise or should I come in to be checked again?  ----- Message -----  From: EMELY Montano  Sent: 2/13/2020 11:21 AM EST  To: Ki Harman  Subject: mammogram  Francesca Parent discussed mammo a few times. You were doing to think about it and get back to us. Are you ready for mammogram?  Let me know, thanks!

## 2020-02-28 ENCOUNTER — TELEPHONE (OUTPATIENT)
Dept: FAMILY MEDICINE CLINIC | Age: 40
End: 2020-02-28

## 2020-02-28 NOTE — TELEPHONE ENCOUNTER
Pt likely needs an apt to discuss her issues. I have an opening today and Monday. My chart message as follows:  \"I think it would be a good idea to do a mammogram. So please let me know what I need to do or go to get that set up. I also want to ask a few other questions. I've talked to the Dr about my right side having pain. The pain has gotten worse and the pills do not help. I've also been fighting a cold for the last 3 weeks on and off fever but the last three days my upper arabella has been numb. Any advise or should I come in to be checked again?  \"

## 2020-07-06 ENCOUNTER — TELEPHONE (OUTPATIENT)
Dept: FAMILY MEDICINE CLINIC | Age: 40
End: 2020-07-06

## 2020-07-06 NOTE — TELEPHONE ENCOUNTER
LMOM for patient to return a call back regarding reasoning for COVID-19 testing   Exposure, symptoms, work related testing etc...

## 2020-07-06 NOTE — TELEPHONE ENCOUNTER
Pt is calling and is wanting to get an order to get tested for Covid.   Please advise pt at [phone]

## 2020-07-06 NOTE — TELEPHONE ENCOUNTER
Pt returned phone call. She was exposed to [employer] about 2 weeks ago. Her sxs started a week ago with diarrhea, dizziness, SOB, sore throat and a temp of 100.5 off and on.

## 2020-07-07 ENCOUNTER — HOSPITAL ENCOUNTER (OUTPATIENT)
Age: 40
Discharge: HOME | End: 2020-07-07
Payer: COMMERCIAL

## 2020-07-07 DIAGNOSIS — Z20.822 SUSPECTED COVID-19 VIRUS INFECTION: ICD-10-CM

## 2020-07-07 PROCEDURE — 99211 OFF/OP EST MAY X REQ PHY/QHP: CPT

## 2020-07-07 PROCEDURE — U0003 INFECTIOUS AGENT DETECTION BY NUCLEIC ACID (DNA OR RNA); SEVERE ACUTE RESPIRATORY SYNDROME CORONAVIRUS 2 (SARS-COV-2) (CORONAVIRUS DISEASE [COVID-19]), AMPLIFIED PROBE TECHNIQUE, MAKING USE OF HIGH THROUGHPUT TECHNOLOGIES AS DESCRIBED BY CMS-2020-01-R: HCPCS

## 2020-07-09 ENCOUNTER — PATIENT MESSAGE (OUTPATIENT)
Dept: FAMILY MEDICINE CLINIC | Age: 40
End: 2020-07-09

## 2020-07-09 NOTE — LETTER
[address] [place] [address]  [address] [place] [address]. [place] [address]  Phone: [phone]  Fax: [address] , DO        July 9, 2020    Maddi Avery  [address] [address]      To Whom It May Concern:    Please excuse Marco Olson from work for the dates of June 30, 2020 to July 13,2020. Marco Olson may return to work on July 14,2020. If you have any questions or concerns, please don't hesitate to call.     Sincerely,        Satnam Mclaughlin, DO

## 2020-07-09 NOTE — TELEPHONE ENCOUNTER
From: Maria Teresa Calderon  To: Sumimelani Montero,   Sent: 7/9/2020 10:11 AM EDT  Subject: Visit Follow-Up Question    I talked to the office a couple days ago about having symptoms and being exposed to covid19. I got tested on Tuesday but due to me being exposed and having symptoms I have been off work and quaritened. I was exposed at the end of June and beginning of July. Can I get a Dr note to be off work? July 14th would be day 14 of the last day I was exposed.

## 2020-07-10 ENCOUNTER — TELEPHONE (OUTPATIENT)
Dept: FAMILY MEDICINE CLINIC | Age: 40
End: 2020-07-10

## 2020-07-10 LAB — SARS-COV-2: NOT DETECTED

## 2020-07-10 NOTE — TELEPHONE ENCOUNTER
----- Message from Myron Day DO sent at 7/10/2020  1:11 PM EDT -----  Please let pt know covid testing is negative  Let me know if questions, thanks!

## 2020-07-13 RX ORDER — AMLODIPINE BESYLATE 10 MG/1
10 TABLET ORAL DAILY
Qty: 90 TABLET | Refills: 1 | Status: SHIPPED | OUTPATIENT
Start: 2020-07-13 | End: 2020-10-06 | Stop reason: SINTOL

## 2020-08-06 ENCOUNTER — HOSPITAL ENCOUNTER (OUTPATIENT)
Dept: GENERAL RADIOLOGY | Age: 40
Discharge: HOME | End: 2020-08-06
Payer: COMMERCIAL

## 2020-08-06 ENCOUNTER — TELEPHONE (OUTPATIENT)
Dept: FAMILY MEDICINE CLINIC | Age: 40
End: 2020-08-06

## 2020-08-06 ENCOUNTER — HOSPITAL ENCOUNTER (OUTPATIENT)
Age: 40
Discharge: HOME | End: 2020-08-06
Payer: COMMERCIAL

## 2020-08-06 ENCOUNTER — OFFICE VISIT (OUTPATIENT)
Dept: FAMILY MEDICINE CLINIC | Age: 40
End: 2020-08-06
Payer: COMMERCIAL

## 2020-08-06 VITALS
DIASTOLIC BLOOD PRESSURE: 84 MMHG | SYSTOLIC BLOOD PRESSURE: 122 MMHG | TEMPERATURE: 98.3 F | RESPIRATION RATE: 18 BRPM | WEIGHT: 290 LBS | HEIGHT: 71 IN | HEART RATE: 73 BPM | BODY MASS INDEX: 40.6 KG/M2 | OXYGEN SATURATION: 98 %

## 2020-08-06 PROCEDURE — 73564 X-RAY EXAM KNEE 4 OR MORE: CPT

## 2020-08-06 PROCEDURE — 73130 X-RAY EXAM OF HAND: CPT

## 2020-08-06 PROCEDURE — 73590 X-RAY EXAM OF LOWER LEG: CPT

## 2020-08-06 PROCEDURE — 99213 OFFICE O/P EST LOW 20 MIN: CPT | Performed by: FAMILY MEDICINE

## 2020-08-06 PROCEDURE — 73110 X-RAY EXAM OF WRIST: CPT

## 2020-08-06 NOTE — PATIENT INSTRUCTIONS
Patient Education        Hand Pain: Care Instructions  Your Care Instructions     Common causes of hand pain are overuse and injuries, such as might happen during sports or home repair projects. Everyday wear and tear, especially as you get older, also can cause hand pain. Most minor hand injuries will heal on their own, and home treatment is usually all you need to do. If you have sudden and severe pain, you may need tests and treatment. Follow-up care is a key part of your treatment and safety. Be sure to make and go to all appointments, and call your doctor if you are having problems. It's also a good idea to know your test results and keep a list of the medicines you take. How can you care for yourself at home? · Take pain medicines exactly as directed. ? If the doctor gave you a prescription medicine for pain, take it as prescribed. ? If you are not taking a prescription pain medicine, ask your doctor if you can take an over-the-counter medicine. · Rest and protect your hand. Take a break from any activity that may cause pain. · Put ice or a cold pack on your hand for 10 to 20 minutes at a time. Put a thin cloth between the ice and your skin. · Prop up the sore hand on a pillow when you ice it or anytime you sit or lie down during the next 3 days. Try to keep it above the level of your heart. This will help reduce swelling. · If your doctor recommends a sling, splint, or elastic bandage to support your hand, wear it as directed. When should you call for help? [id number] anytime you think you may need emergency care. For example, call if:  · Your hand turns cool or pale or changes color. Call your doctor now or seek immediate medical care if:  · You cannot move your hand. · Your hand pops, moves out of its normal position, and then returns to its normal position. · You have signs of infection, such as:  ? Increased pain, swelling, warmth, or redness.   ? Red streaks leading from the sore area.  ? Pus draining from a place on your hand. ? A fever. · Your hand feels numb or tingly. Watch closely for changes in your health, and be sure to contact your doctor if:  · Your hand feels unstable when you try to use it. · You do not get better as expected. · You have any new symptoms, such as swelling. · Bruises from an injury to your hand last longer than 2 weeks. Where can you learn more? Go to https://OrthoSensor.raksul. org and sign in to your [employer] account. Enter R273 in the [facility] box to learn more about \"Hand Pain: Care Instructions. \"     If you do not have an account, please click on the \"Sign Up Now\" link. Current as of: June 26, 2019               Content Version: 12.5  © 6936-7687 [employer]. Care instructions adapted under license by [facility] ([facility]). If you have questions about a medical condition or this instruction, always ask your healthcare professional. [address] any warranty or liability for your use of this information.

## 2020-08-06 NOTE — PROGRESS NOTES
Chief Complaint   Patient presents with    Fall     Pt fell on friday. She has had pain in her right hand and right knee. Her toes started feeling numb last night. History obtained from the patient. SUBJECTIVE:  Patrica Morton is a 41 y.o.  that presents today for       -Fall:  Layman Marvel last Friday  Tripped over a curb  Landed on R hand, wrist, hip and knee  Having persistent R wrist, hand, R knee and R lower leg pain  Some welling and bruising  Not getting better. Pain worse with moving   Better with rest  Able to walk and bear wt on leg      Age/Gender Health Maintenance    Lipid -   Lab Results   Component Value Date    CHOL 200 (H) 11/06/2019     Lab Results   Component Value Date    TRIG 122 11/06/2019     Lab Results   Component Value Date    HDL 48 11/06/2019     Lab Results   Component Value Date    LDLCALC 128 11/06/2019     No results found for: LABVLDL, VLDL  No results found for: CHOLHDLRATIO      DM Screen -   Lab Results   Component Value Date    GLUCOSE 95 11/06/2019     TSH -   Lab Results   Component Value Date    TSH 3.070 12/31/2019       Colon Cancer Screening - age 46  Lung Cancer Screening (Age 52 to [de-identified] with 30 pack year hx, current smoker or quit within past 15 years) - never smoker. Tetanus - UTD 2007  Influenza Vaccine - UTD FALL 2019  Pneumonia Vaccine - age 70  Zoster - age 46     Breast Cancer Screening - wants to wait for now (OCT 2019)  Cervical Cancer Screening - hyst for benign reasons  Osteoporosis Screening - age 59      Current Outpatient Medications   Medication Sig Dispense Refill    amLODIPine (NORVASC) 10 MG tablet TAKE 1 TABLET BY MOUTH DAILY 90 tablet 1    tiZANidine (ZANAFLEX) 4 MG tablet TAKE 1 TABLET BY MOUTH EVERY 8 HOURS AS NEEDED FOR RIGHT SIDED RIB PAIN OR SPASM 30 tablet 0    levothyroxine (SYNTHROID) 25 MCG tablet TAKE 1 TABLET BY MOUTH DAILY 90 tablet 3     No current facility-administered medications for this visit.       No orders of the defined types were placed in this encounter. All medications reviewed and reconciled, including OTC and herbal medications. Updated list given to patient. Patient Active Problem List   Diagnosis    Patellofemoral stress syndrome of right knee    Right knee meniscal tear    Osteochondral defect of patella, right    Morbid obesity ([address]    Multiple thyroid nodules    Adrenal adenoma; bilateral. Stable and benign on CT    Hypothyroidism, postoperative    Hypertension, essential    Chronic daily headache    Analgesic rebound headache    S/P partial thyroidectomy       Past Medical History:   Diagnosis Date    Adrenal adenoma; bilateral. Stable and benign on CT     Hypertension, essential 11/11/2019    Hypothyroidism, postoperative     Morbid obesity ([address]     S/P partial thyroidectomy 11/11/2019       Past Surgical History:   Procedure Laterality Date    BACK SURGERY      post aa    CARPAL TUNNEL RELEASE Right     DILATION AND CURETTAGE OF UTERUS      KNEE ARTHROSCOPY Right 11/05/2015    Medial Menesectomy with Chrondroplasty - Dr Kanwal Palacio N/A 3/9/2018    DIAGNOSTIC LAPAROSCOPY, APPENDECTOMY, infarcted appendix plancha performed by Kimberly Ko MD at [address] [place] [address] Avenue  age 19    endometriosis.  TONSILLECTOMY         Allergies   Allergen Reactions    Tape Yvonnie Paci Tape] Rash       Social History     Tobacco Use    Smoking status: Never Smoker    Smokeless tobacco: Never Used   Substance Use Topics    Alcohol use: No       Family History   Problem Relation Age of Onset    Colon Cancer Paternal Grandmother 80    Breast Cancer Maternal Grandmother 72    High Blood Pressure Mother     Diabetes Father     Ovarian Cancer Neg Hx     Tuberculosis Neg Hx          I have reviewed the patient's past medical history, past surgical history, allergies, medications, social and family history and I have made updates where appropriate.       Review of Systems  Positive responses are highlighted in bold    Constitutional:  Fever, Chills, Night Sweats, Fatigue, Unexpected changes in weight  HENT:  Ear pain, Tinnitus, Nosebleeds, Trouble swallowing, Hearing loss, Sore throat  Cardiovascular:  Chest Pain, Palpitations, Orthopnea, Paroxysmal Nocturnal Dyspnea  Respiratory:  Cough, Wheezing, Shortness of breath, Chest tightness, Apnea  Gastrointestinal:  Nausea, Vomiting, Diarrhea, Constipation, Heartburn, Blood in stool  Genitourinary:  Difficulty or painful urination, Flank pain, Change in frequency, Urgency  Skin:  Color change, Rash, Itching, Wound  Musculoskeletal:  Joint pain, Back pain, Gait problems, Joint swelling, Myalgias  Neurological:  Dizziness, Headaches, Presyncope, Numbness, Seizures, Tremors  Endocrine:  Heat Intolerance, Cold Intolerance, Polydipsia, Polyphagia, Polyuria      PHYSICAL EXAM:  Vitals:    08/06/20 1044   BP: 122/84   Pulse: 73   Resp: 18   Temp: 98.3 °F (36.8 °C)   SpO2: 98%   Weight: 290 lb (131.5 kg)   Height: 5' 10.5\" (1.791 m)     Body mass index is 41.02 kg/m². Pain Score:   4(R hand, wrist, knee and leg)    VS Reviewed  General Appearance: A&O x 3, No acute distress,well developed and well- nourished  Eyes: pupils equal, round, and reactive to light, extraocular eye movements intact, conjunctivae and eye lids without erythema  ENT: external ear and ear canal clear bilaterally, TMs intact and regular, nose without deformity, nasal mucosa and turbinates normal without polyps, oropharynx normal, dentition is normal for age  Neck: supple and non-tender without mass, no thyromegaly or thyroid nodules, no cervical lymphadenopathy  Pulmonary/Chest: clear to auscultation bilaterally- no wheezes, rales or rhonchi, normal air movement, no respiratory distress or retractions  Cardiovascular: S1 and S2 auscultated w/ RRR. No murmurs, rubs, clicks, or gallops, distal pulses intact.   Abdomen: soft, non-tender, non-distended, bowel sounds physiologic,  no rebound or guarding, no masses or hernias noted. Liver and spleen without enlargement. Extremities: no cyanosis, clubbing or edema of the lower extremities. Skin: warm and dry, no rash or erythema  Right Wrist and Hand Exam:  There is mild  swelling. There is not skeletal deformity. Wrist range of motion is  limited by pain. Digital range of motion is not limited by pain and swelling.   + TTP at [employer]. FDS,FDP and Common Extensor tendon function is intact to each digit. FPL and EPL tendon function is intact to the thumb. Phalen's test negative. Tinnel's test negative. Finkelstein's test negative. Skin color, texture, turgor is normal.  No rashes or lesions found of the injured extremity. Vascular exam shows normal  And good capillary refill bilaterally. Sensation is subjectively normal in the whole hand. Digits are normally sensate. R Knee Exam:  5/5 strength in the LEs bilaterally  No gross deformity, edema or discoloration of the knees bilaterally  Joint Line tenderness - positive  Valgus/Varus testing is negative  Lachman's test is negative  Roya test is negative   R lower leg: + TTP anterior tibia      ASSESSMENT & PLAN  1. Pain of right hand    Will get xrays of R wrist, hand, knee and lower leg  Place thumb spicca  If xray wrist/hand neg, keep in splint x 2 wks and repeat xray  Ok to return to work    - XR WRIST RIGHT (MIN 3 VIEWS); Future  - XR HAND RIGHT (MIN 3 VIEWS); Future    2. Acute pain of right wrist    - XR WRIST RIGHT (MIN 3 VIEWS); Future  - XR HAND RIGHT (MIN 3 VIEWS); Future    3. Acute pain of right knee    - XR KNEE RIGHT (MIN 4 VIEWS); Future  - XR TIBIA FIBULA RIGHT (2 VIEWS); Future    4. Pain in right lower leg    - XR KNEE RIGHT (MIN 4 VIEWS); Future  - XR TIBIA FIBULA RIGHT (2 VIEWS); Future    5. Fall, initial encounter    - XR WRIST RIGHT (MIN 3 VIEWS); Future  - XR KNEE RIGHT (MIN 4 VIEWS);  Future  - XR TIBIA FIBULA RIGHT (2 VIEWS); Future      DISPOSITION    Return if symptoms worsen or fail to improve. Dione Parker released without restrictions. Future Appointments   Date Time Provider Junior Kim   1/12/2021  8:00 AM Evelyne Chapman DO [facility] [address]    Patient given educational materials on: See Attached    Barriers to learning and self management: none    Discussed use, benefit, and side effects of prescribed medications. Barriers to medication compliance addressed. All patient questions answered. Pt voiced understanding.        Electronically signed by Evelyne Chapman DO on 8/6/2020 at 11:05 AM

## 2020-08-06 NOTE — TELEPHONE ENCOUNTER
Pt informed. She will go to [facility] in 2 weeks to have xray repeated. Order in [place].

## 2020-08-06 NOTE — TELEPHONE ENCOUNTER
----- Message from Satnam Mclaughlin, DO sent at 8/6/2020  3:29 PM EDT -----  Please let pt know that xrays show no fracture  As discussed in clinic, stay in splint x 2 wks  Will repeat xray of hand in 2 wks, ordered today  Let me know if questions, thanks!

## 2020-08-13 ENCOUNTER — TELEPHONE (OUTPATIENT)
Dept: FAMILY MEDICINE CLINIC | Age: 40
End: 2020-08-13

## 2020-08-13 NOTE — TELEPHONE ENCOUNTER
lmom for enrique to let us know which correction's office it is since they never let us know the number or name of corrections office is.

## 2020-08-13 NOTE — TELEPHONE ENCOUNTER
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 However, they said if pt is wearing a brace, that is a restriction. That date needs to be changed to when pt wont have to wear the wrist brace anymore in order for pt to be able to go back to work. Please advise.     Fax is [phone]

## 2020-08-13 NOTE — TELEPHONE ENCOUNTER
Jacqui Keene from corrections office just needs an estimated date. So elizabeth said the estimated date of when pt gets the xray done would work. So the date would be from 2 weeks until today.  Since that would be the follow up date for the xray

## 2020-08-19 ENCOUNTER — HOSPITAL ENCOUNTER (OUTPATIENT)
Age: 40
Discharge: HOME | End: 2020-08-19
Payer: COMMERCIAL

## 2020-08-19 ENCOUNTER — HOSPITAL ENCOUNTER (OUTPATIENT)
Dept: GENERAL RADIOLOGY | Age: 40
Discharge: HOME | End: 2020-08-19
Payer: COMMERCIAL

## 2020-08-19 PROBLEM — T39.95XA ANALGESIC REBOUND HEADACHE: Status: RESOLVED | Noted: 2019-11-11 | Resolved: 2020-08-19

## 2020-08-19 PROBLEM — G44.40 ANALGESIC REBOUND HEADACHE: Status: RESOLVED | Noted: 2019-11-11 | Resolved: 2020-08-19

## 2020-08-19 PROBLEM — R51.9 CHRONIC DAILY HEADACHE: Status: RESOLVED | Noted: 2019-11-11 | Resolved: 2020-08-19

## 2020-08-19 PROCEDURE — 73130 X-RAY EXAM OF HAND: CPT

## 2020-08-19 NOTE — PROGRESS NOTES
tablet 3    tiZANidine (ZANAFLEX) 4 MG tablet TAKE 1 TABLET BY MOUTH EVERY 8 HOURS AS NEEDED FOR RIGHT SIDED RIB PAIN OR SPASM 30 tablet 0     No current facility-administered medications for this visit. No orders of the defined types were placed in this encounter. All medications reviewed and reconciled, including OTC and herbal medications. Updated list given to patient. Patient Active Problem List   Diagnosis    Patellofemoral stress syndrome of right knee    Right knee meniscal tear    Osteochondral defect of patella, right    Morbid obesity ([address]    Multiple thyroid nodules    Adrenal adenoma; bilateral. Stable and benign on CT    Hypothyroidism, postoperative    Hypertension, essential    S/P partial thyroidectomy       Past Medical History:   Diagnosis Date    Adrenal adenoma; bilateral. Stable and benign on CT     Hypertension, essential 11/11/2019    Hypothyroidism, postoperative     Morbid obesity ([address]     S/P partial thyroidectomy 11/11/2019       Past Surgical History:   Procedure Laterality Date    BACK SURGERY      post aa    CARPAL TUNNEL RELEASE Right     DILATION AND CURETTAGE OF UTERUS      KNEE ARTHROSCOPY Right 11/05/2015    Medial Menesectomy with Chrondroplasty - Dr Rufino Perez N/A 3/9/2018    DIAGNOSTIC LAPAROSCOPY, APPENDECTOMY, infarcted appendix plancha performed by Melissa Stewart MD at [address] [place] [address]  age 19    endometriosis.      TH[facility]CTOMY [facility] 10/2019    [facility]    TONSILLECTOMY         Allergies   Allergen Reactions    Tape Hazle Oswaldo Tape] Rash       Social History     Tobacco Use    Smoking status: Never Smoker    Smokeless tobacco: Never Used   Substance Use Topics    Alcohol use: No       Family History   Problem Relation Age of Onset    Colon Cancer Paternal Grandmother 80    Breast Cancer Maternal Grandmother 72    High Blood Pressure Mother     Diabetes Father     Ovarian Cancer Neg Hx     Tuberculosis Neg Hx          I have reviewed the patient's past medical history, past surgical history, allergies, medications, social and family history and I have made updates where appropriate. Review of Systems  Positive responses are highlighted in bold    Constitutional:  Fever, Chills, Night Sweats, Fatigue, Unexpected changes in weight  HENT:  Ear pain, Tinnitus, Nosebleeds, Trouble swallowing, Hearing loss, Sore throat  Cardiovascular:  Chest Pain, Palpitations, Orthopnea, Paroxysmal Nocturnal Dyspnea  Respiratory:  Cough, Wheezing, Shortness of breath, Chest tightness, Apnea  Gastrointestinal:  Nausea, Vomiting, Diarrhea, Constipation, Heartburn, Blood in stool  Genitourinary:  Difficulty or painful urination, Flank pain, Change in frequency, Urgency  Skin:  Color change, Rash, Itching, Wound  Musculoskeletal:  Joint pain, Back pain, Gait problems, Joint swelling, Myalgias  Neurological:  Dizziness, Headaches, Presyncope, Numbness, Seizures, Tremors  Endocrine:  Heat Intolerance, Cold Intolerance, Polydipsia, Polyphagia, Polyuria      PHYSICAL EXAM:  Vitals:    08/20/20 0900   BP: 136/78   Pulse: 82   Resp: 16   Temp: 97.3 °F (36.3 °C)   TempSrc: Temporal   SpO2: 98%   Weight: 297 lb 3.2 oz (134.8 kg)   Height: 5' 10.5\" (1.791 m)     Body mass index is 42.04 kg/m².   Pain Score:   3(R hand and knee)    VS Reviewed  General Appearance: A&O x 3, No acute distress,well developed and well- nourished  Eyes: pupils equal, round, and reactive to light, extraocular eye movements intact, conjunctivae and eye lids without erythema  ENT: external ear and ear canal clear bilaterally, TMs intact and regular, nose without deformity, nasal mucosa and turbinates normal without polyps, oropharynx normal, dentition is normal for age  Neck: supple and non-tender without mass, no thyromegaly or thyroid nodules, no cervical lymphadenopathy  Pulmonary/Chest: clear to auscultation bilaterally- no wheezes, rales or rhonchi, normal air movement, no respiratory distress or retractions  Cardiovascular: S1 and S2 auscultated w/ RRR. No murmurs, rubs, clicks, or gallops, distal pulses intact. Abdomen: soft, non-tender, non-distended, bowel sounds physiologic,  no rebound or guarding, no masses or hernias noted. Liver and spleen without enlargement. Extremities: no cyanosis, clubbing or edema of the lower extremities. Skin: warm and dry, no rash or erythema  Right Wrist and Hand Exam:  There is mild  swelling. There is not skeletal deformity. Wrist range of motion is  limited by pain. Digital range of motion is not limited by pain and swelling. Neg TTP at [employer]. FDS,FDP and Common Extensor tendon function is intact to each digit. FPL and EPL tendon function is intact to the thumb. Phalen's test negative. Tinnel's test negative. Finkelstein's test negative. Skin color, texture, turgor is normal.  No rashes or lesions found of the injured extremity. Vascular exam shows normal  And good capillary refill bilaterally. Sensation is subjectively normal in the whole hand. Digits are normally sensate. R Knee Exam:  5/5 strength in the LEs bilaterally  No gross deformity, edema or discoloration of the knees bilaterally  Joint Line tenderness - positive  Valgus/Varus testing is negative  Lachman's test is negative  Roya test is negative   R lower leg: + TTP anterior tibia but less so then last visit      Narrative    PROCEDURE: XR HAND RIGHT (MIN 3 VIEWS)         CLINICAL INFORMATION: Right hand pain following a fall 2 weeks ago         TECHNIQUE: 3 views of the right hand         COMPARISON: Right hand 8/6/2020         FINDINGS: There is no fracture or dislocation. Joint spaces are preserved. No soft tissue abnormalities are identified.  A well-defined sclerotic lesion in the scaphoid bone is likely a benign bone island.              Impression         No fracture or dislocation.         Final report electronically signed by Dr. Zia Diallo on 8/19/2020 1:22 PM       Narrative    PROCEDURE: XR HAND RIGHT (MIN 3 VIEWS), XR WRIST RIGHT (MIN 3 VIEWS)         CLINICAL INFORMATION: Pain of right hand, Acute pain of right wrist. Fell on outstretched hand         COMPARISON: No prior study.         TECHNIQUE: 3 standard views of the right hand and 4 standard views of the right wrist were obtained.         FINDINGS:         RIGHT WRIST: No fracture or dislocation is seen. There are mild degenerative changes involving the greater multangular/first metacarpal joint. No soft tissue swelling is seen.         RIGHT HAND: Mild degenerative changes involve the DIP joint of the second, third, and fifth digits. Mild degenerative changes involve the greater multangular/first metacarpal joint. No fracture or dislocation is seen. There is suggestion of slight soft    tissue swelling overlying the metacarpal heads.              Impression    1. Mild degenerative changes. Mild soft tissue swelling overlying the metacarpal heads dorsally. 2. Study otherwise unremarkable. No fracture is seen. .                   **This report has been created using voice recognition software.  It may contain minor errors which are inherent in voice recognition technology. **         Final report electronically signed by Dr. Leeanne Bazan on 8/6/2020 3:21 PM        Narrative    PROCEDURE: XR TIBIA FIBULA RIGHT (2 VIEWS)         CLINICAL INFORMATION: Acute pain of right knee, Pain in right lower leg, Fall, initial encounter         COMPARISON: No prior study.         TECHNIQUE: PA and lateral views of the right tibia and fibula were obtained         FINDINGS: No fracture or dislocation is seen. There is mild soft tissue swelling overlying the tibial shaft anteriorly. Moderate degenerative changes involve the knee. There are moderate size Achilles and plantar calcaneal spurs.              Impression    Soft tissue swelling.  No fracture.                   **This report has been created using voice recognition software.  It may contain minor errors which are inherent in voice recognition technology. **         Final report electronically signed by Dr. Eleazar Avalos on 8/6/2020 3:17 PM        Narrative    PROCEDURE: XR KNEE RIGHT (MIN 4 VIEWS)         CLINICAL INFORMATION: Acute pain of right knee, Pain in right lower leg, Fall, initial encounter         COMPARISON: 5/8/2018         TECHNIQUE: 4 views of the right knee were obtained.         FINDINGS: No fracture or dislocation is seen. There is no foreign body. There is moderate degenerative spurring of the knee medially. Mild degenerative spurring is seen laterally. Is also moderate spurring along the posterior aspect of the patella as    well as the posterior aspect distal femur proximal tibia. . There is no joint effusion.              Impression    Mild degenerative changes right knee. Overall appearance of knee has slightly worsened since prior study.                   **This report has been created using voice recognition software.  It may contain minor errors which are inherent in voice recognition technology. **                   Final report electronically signed by Dr. Eleazar Avalos on 8/6/2020 2:22 PM        ASSESSMENT & PLAN  1. Pain of right hand    Xray neg x 2  No fxr  Tracy Ruse to come out of splint  Likely sprain at this point  con't HEP  Prn tylenol  rtw w/o restrictions  If not sig improved by 4 to 6 wks, call and will get setup with [facility]    2. Acute pain of right wrist      3. Acute pain of right knee    Improving  Xray neg  Plan as above    4. Pain in right lower leg      5. Fall, initial encounter      DISPOSITION    Return if symptoms worsen or fail to improve. Leocadia Nadine released without restrictions.     Future Appointments   Date Time Provider Junior Kim   1/12/2021  8:00 AM Vaishnavi Mcdonnell DO [facility] [address]     PATIENT COUNSELING    Patient given educational materials on: See

## 2020-08-20 ENCOUNTER — OFFICE VISIT (OUTPATIENT)
Dept: FAMILY MEDICINE CLINIC | Age: 40
End: 2020-08-20
Payer: COMMERCIAL

## 2020-08-20 VITALS
DIASTOLIC BLOOD PRESSURE: 78 MMHG | TEMPERATURE: 97.3 F | HEIGHT: 71 IN | HEART RATE: 82 BPM | OXYGEN SATURATION: 98 % | BODY MASS INDEX: 41.02 KG/M2 | WEIGHT: 293 LBS | RESPIRATION RATE: 16 BRPM | SYSTOLIC BLOOD PRESSURE: 136 MMHG

## 2020-08-20 PROCEDURE — 99213 OFFICE O/P EST LOW 20 MIN: CPT | Performed by: FAMILY MEDICINE

## 2020-08-20 RX ORDER — TIZANIDINE 4 MG/1
TABLET ORAL
Qty: 30 TABLET | Refills: 0 | Status: SHIPPED | OUTPATIENT
Start: 2020-08-20 | End: 2020-11-17 | Stop reason: SDUPTHER

## 2020-08-20 NOTE — PATIENT INSTRUCTIONS
Patient Education        Wrist Sprain: Care Instructions  Your Care Instructions     Your wrist hurts because you have stretched or torn ligaments, which connect the bones in your wrist.  Wrist sprains usually take from 2 to 10 weeks to heal, but some take longer. Usually, the more pain you have, the more severe your wrist sprain is and the longer it will take to heal. You can heal faster and regain strength in your wrist with good home treatment. Follow-up care is a key part of your treatment and safety. Be sure to make and go to all appointments, and call your doctor if you are having problems. It's also a good idea to know your test results and keep a list of the medicines you take. How can you care for yourself at home? · Prop up your arm on a pillow when you ice it or anytime you sit or lie down for the next 3 days. Try to keep your wrist above the level of your heart. This will help reduce swelling. · Put ice or cold packs on your wrist for 10 to 20 minutes at a time. Try to do this every 1 to 2 hours for the next 3 days (when you are awake) or until the swelling goes down. Put a thin cloth between the ice pack and your skin. · After 2 or 3 days, if your swelling is gone, apply a heating pad set on low or a warm cloth to your wrist. This helps keep your wrist flexible. Some doctors suggest that you go back and forth between hot and cold. · If you have an elastic bandage, keep it on for the next 24 to 36 hours. The bandage should be snug but not so tight that it causes numbness or tingling. To rewrap the wrist, wrap the bandage around the hand a few times, beginning at the fingers. Then wrap it around the hand between the thumb and index finger, ending by circling the wrist several times. · If your doctor gave you a splint or brace, wear it as directed to protect your wrist until it has healed. · Take pain medicines exactly as directed.   ? If the doctor gave you a prescription medicine for pain, take it as prescribed. ? If you are not taking a prescription pain medicine, ask your doctor if you can take an over-the-counter medicine. · Try not to use your injured wrist and hand. When should you call for help? Call your doctor now or seek immediate medical care if:  · Your hand or fingers are cool or pale or change color. Watch closely for changes in your health, and be sure to contact your doctor if:  · Your pain gets worse. · Your wrist has not improved after 1 week. Where can you learn more? Go to https://GiveGabpechristianaewjanell.Peers App. org and sign in to your RightAnswers account. Enter V101 in the ISH box to learn more about \"Wrist Sprain: Care Instructions. \"     If you do not have an account, please click on the \"Sign Up Now\" link. Current as of: March 2, 2020               Content Version: 12.5  © 2006-2020 [employer]. Care instructions adapted under license by [facility] ([facility]). If you have questions about a medical condition or this instruction, always ask your healthcare professional. [address] any warranty or liability for your use of this information. Patient Education        Wrist Sprain: Rehab Exercises  Introduction  Here are some examples of exercises for you to try. The exercises may be suggested for a condition or for rehabilitation. Start each exercise slowly. Ease off the exercises if you start to have pain. You will be told when to start these exercises and which ones will work best for you. How to do the exercises  Resisted wrist extension   1. Sit leaning forward with your legs slightly spread. Then place your forearm on your thigh with your affected hand and wrist in front of your knee. 2. Grasp one end of an exercise band with your palm down. Step on the other end.  3. Slowly bend your wrist upward for a count of 2. Then lower your wrist slowly to a count of 5.  4. Repeat 8 to 12 times. Resisted wrist flexion   1.  Sit the other end. 3. Keeping your wrist straight, roll your palm outward and away from your thigh for a count of 2. Then slowly move your wrist back to the starting position to a count of 5.  4. Repeat 8 to 12 times. Follow-up care is a key part of your treatment and safety. Be sure to make and go to all appointments, and call your doctor if you are having problems. It's also a good idea to know your test results and keep a list of the medicines you take. Where can you learn more? Go to https://Music NationpeLoandesk.Zedmo. org and sign in to your [facility] account. Enter S110 in the emo2 Inc box to learn more about \"Wrist Sprain: Rehab Exercises. \"     If you do not have an account, please click on the \"Sign Up Now\" link. Current as of: March 2, 2020               Content Version: 12.5  © 6823-6377 Healthwise, Incorporated. Care instructions adapted under license by [facility] ([facility]). If you have questions about a medical condition or this instruction, always ask your healthcare professional. [address] any warranty or liability for your use of this information.

## 2020-08-20 NOTE — LETTER
[address] [facility]  [address] [facility] [place] [address]  Phone: [phone]  Fax: [address] , DO      August 20, 2020    Patient: Caprice Laurent   YOB: 1980   Date of Visit: 8/20/2020       To Whom it May Concern:    Danielle Torres was seen in my clinic on 8/20/2020. She may return to work today and she may return to work w/o restrictions w/o her brace. If you have any questions or concerns, please don't hesitate to call.       Sincerely,         Rhys Devi, DO

## 2020-09-16 ENCOUNTER — TELEPHONE (OUTPATIENT)
Dept: FAMILY MEDICINE CLINIC | Age: 40
End: 2020-09-16

## 2020-09-16 NOTE — TELEPHONE ENCOUNTER
2nd attempt to contact the pt re:overdue labs Dr Jory Shah ordered on 1/7/20. HIPAA form is up to date, order mailed.

## 2020-09-30 ENCOUNTER — NURSE ONLY (OUTPATIENT)
Dept: LAB | Age: 40
End: 2020-09-30

## 2020-09-30 LAB — TSH SERPL DL<=0.05 MIU/L-ACNC: 3.89 UIU/ML (ref 0.4–4.2)

## 2020-10-01 ENCOUNTER — TELEPHONE (OUTPATIENT)
Dept: FAMILY MEDICINE CLINIC | Age: 40
End: 2020-10-01

## 2020-10-01 NOTE — TELEPHONE ENCOUNTER
----- Message from Jolynn Garcia DO sent at 9/30/2020  7:13 PM EDT -----  Please let pt know that thyroid fxn remains WNL  Let me know if questions, thanks!

## 2020-10-06 ENCOUNTER — OFFICE VISIT (OUTPATIENT)
Dept: FAMILY MEDICINE CLINIC | Age: 40
End: 2020-10-06
Payer: COMMERCIAL

## 2020-10-06 VITALS
TEMPERATURE: 97.9 F | WEIGHT: 291 LBS | HEIGHT: 69 IN | HEART RATE: 83 BPM | SYSTOLIC BLOOD PRESSURE: 130 MMHG | DIASTOLIC BLOOD PRESSURE: 80 MMHG | RESPIRATION RATE: 16 BRPM | BODY MASS INDEX: 43.1 KG/M2

## 2020-10-06 PROCEDURE — 99214 OFFICE O/P EST MOD 30 MIN: CPT | Performed by: FAMILY MEDICINE

## 2020-10-06 RX ORDER — LOSARTAN POTASSIUM 50 MG/1
50 TABLET ORAL DAILY
Qty: 30 TABLET | Refills: 3 | Status: SHIPPED | OUTPATIENT
Start: 2020-10-06 | End: 2020-11-17 | Stop reason: SDUPTHER

## 2020-10-06 NOTE — PATIENT INSTRUCTIONS
changes. ? Sudden trouble speaking. ? Sudden confusion or trouble understanding simple statements. ? Sudden problems with walking or balance. ? A sudden, severe headache that is different from past headaches. Watch closely for changes in your health, and be sure to contact your doctor if you have any problems, or if:  · You do not get better as expected. Where can you learn more? Go to https://Personal Life Mediapepiceweb.Portfolium. org and sign in to your Pharminox account. Enter X207 in the Teikhos Tech box to learn more about \"Numbness and Tingling: Care Instructions. \"     If you do not have an account, please click on the \"Sign Up Now\" link. Current as of: November 20, 2019               Content Version: 12.5  © 0157-6395 Healthwise, Incorporated. Care instructions adapted under license by [facility] ([facility]). If you have questions about a medical condition or this instruction, always ask your healthcare professional. [address] any warranty or liability for your use of this information.

## 2020-10-06 NOTE — LETTER
[address] [place] [address]  Phone: [phone]  Fax: [address] , DO      October 6, 2020    Patient: Francine Teran   YOB: 1980   Date of Visit: 10/6/2020       To Whom it May Concern:    Shannon Bunch was seen in my clinic on 10/6/2020. She should be excused from work for 10/5/2020 and 10/06/2020. May return to work tomorrow, 10/7/2020 w/o restrictions. If you have any questions or concerns, please don't hesitate to call.       Sincerely,         Prieto Graf, DO

## 2020-10-06 NOTE — PROGRESS NOTES
TABLET BY MOUTH EVERY 8 HOURS AS NEEDED FOR RIGHT SIDED RIB PAIN OR SPASM (Patient not taking: Reported on 10/6/2020) 30 tablet 0     No current facility-administered medications for this visit. Orders Placed This Encounter   Medications    losartan (COZAAR) 50 MG tablet     Sig: Take 1 tablet by mouth daily     Dispense:  30 tablet     Refill:  3         All medications reviewed and reconciled, including OTC and herbal medications. Updated list given to patient. Patient Active Problem List   Diagnosis    Patellofemoral stress syndrome of right knee    Right knee meniscal tear    Osteochondral defect of patella, right    Morbid obesity ([address]    Multiple thyroid nodules    Adrenal adenoma; bilateral. Stable and benign on CT    Hypothyroidism, postoperative    Hypertension, essential    S/P partial thyroidectomy       Past Medical History:   Diagnosis Date    Adrenal adenoma; bilateral. Stable and benign on CT     Hypertension, essential 11/11/2019    Hypothyroidism, postoperative     Morbid obesity ([address]     S/P partial thyroidectomy 11/11/2019       Past Surgical History:   Procedure Laterality Date    BACK SURGERY      post aa    CARPAL TUNNEL RELEASE Right     DILATION AND CURETTAGE OF UTERUS      KNEE ARTHROSCOPY Right 11/05/2015    Medial Menesectomy with Chrondroplasty - Dr Rahel Weir N/A 3/9/2018    DIAGNOSTIC LAPAROSCOPY, APPENDECTOMY, infarcted appendix plancha performed by Juliane Syed MD at [address] [place] [address]  age 19    endometriosis.      THYROID LOBECTOMY Right 10/2019    [facility]    TONSILLECTOMY         Allergies   Allergen Reactions    Tape Bro Blanca Tape] Rash       Social History     Tobacco Use    Smoking status: Never Smoker    Smokeless tobacco: Never Used   Substance Use Topics    Alcohol use: No       Family History   Problem Relation Age of Onset    Colon Cancer Paternal Grandmother 80    Breast Cancer Maternal Grandmother 72    High Blood Pressure Mother     Diabetes Father     Ovarian Cancer Neg Hx     Tuberculosis Neg Hx          I have reviewed the patient's past medical history, past surgical history, allergies, medications, social and family history and I have made updates where appropriate. Review of Systems  Positive responses are highlighted in bold    Constitutional:  Fever, Chills, Night Sweats, Fatigue, Unexpected changes in weight  HENT:  Ear pain, Tinnitus, Nosebleeds, Trouble swallowing, Hearing loss, Sore throat  Cardiovascular:  Chest Pain, Palpitations, Orthopnea, Paroxysmal Nocturnal Dyspnea  Respiratory:  Cough, Wheezing, Shortness of breath, Chest tightness, Apnea  Gastrointestinal:  Nausea, Vomiting, Diarrhea, Constipation, Heartburn, Blood in stool  Genitourinary:  Difficulty or painful urination, Flank pain, Change in frequency, Urgency  Skin:  Color change, Rash, Itching, Wound  Musculoskeletal:  Joint pain, Back pain, Gait problems, Joint swelling, Myalgias  Neurological:  Dizziness, Headaches, Presyncope, Numbness, Seizures, Tremors  Endocrine:  Heat Intolerance, Cold Intolerance, Polydipsia, Polyphagia, Polyuria      PHYSICAL EXAM:  Vitals:    10/06/20 1344 10/06/20 1347 10/06/20 1400   BP: (!) 150/100 (!) 140/86 130/80   Pulse: 83     Resp: 16     Temp: 97.9 °F (36.6 °C)     Weight: 291 lb (132 kg)     Height: 5' 9\" (1.753 m)       Body mass index is 42.97 kg/m².   Pain Score:   0 - No pain    VS Reviewed  General Appearance: A&O x 3, No acute distress,well developed and well- nourished  Eyes: pupils equal, round, and reactive to light, extraocular eye movements intact, conjunctivae and eye lids without erythema  ENT: external ear and ear canal clear bilaterally, TMs intact and regular, nose without deformity, nasal mucosa and turbinates normal without polyps, oropharynx normal, dentition is normal for age  Neck: supple and non-tender without mass, no thyromegaly or thyroid nodules, no cervical lymphadenopathy  Pulmonary/Chest: clear to auscultation bilaterally- no wheezes, rales or rhonchi, normal air movement, no respiratory distress or retractions  Cardiovascular: S1 and S2 auscultated w/ RRR. No murmurs, rubs, clicks, or gallops, distal pulses intact. Abdomen: soft, non-tender, non-distended, bowel sounds physiologic,  no rebound or guarding, no masses or hernias noted. Liver and spleen without enlargement. Extremities: no cyanosis, clubbing of the lower extremities. Trace bilat pedal edema. Dec sensation R 2nd toe. Skin: warm and dry, no rash or erythema      ASSESSMENT & PLAN  1. Numbness of toes; R lesser toes, 2nd digit the worst    Unclear etiology  May be made worse by edema  Will get labs, xray and EMG  F/u 6 wks    - Hemoglobin A1C; Future  - Ferritin; Future  - Iron; Future  - Iron Binding Capacity; Future  - Vitamin B12; Future  - XR FOOT RIGHT (MIN 3 VIEWS); Future  - [facility] (EMG) - Lynda Che MD    2. Pedal edema    Suspect mild CVI made worse by norvasc    Plan:  D/c norvasc  Start cozaar  F/u 6 wks    - XR FOOT RIGHT (MIN 3 VIEWS); Future  - losartan (COZAAR) 50 MG tablet; Take 1 tablet by mouth daily  Dispense: 30 tablet; Refill: 3    3. Hypertension, essential    As per # 2  Call in 2 wks if BP's not at goal with change    - CBC Auto Differential; Future  - Comprehensive Metabolic Panel; Future  - Lipid Panel; Future  - Microalbumin / Creatinine Urine Ratio; Future  - TSH with Reflex; Future  - losartan (COZAAR) 50 MG tablet; Take 1 tablet by mouth daily  Dispense: 30 tablet; Refill: 3      DISPOSITION    Return in about 6 weeks (around 11/17/2020) for f/u foot problems/HTN, sooner as needed. King Kaplan released without restrictions.     Future Appointments   Date Time Provider Junior Kim   11/17/2020  8:00 AM Richie [facility]   1/12/2021  8:00 AM Richie Mead COUNSELING    Patient given educational materials on: See Attached    Barriers to learning and self management: none    Discussed use, benefit, and side effects of prescribed medications. Barriers to medication compliance addressed. All patient questions answered. Pt voiced understanding.        Electronically signed by Aline Lopez DO on 10/6/2020 at 2:06 PM

## 2020-10-21 ENCOUNTER — NURSE ONLY (OUTPATIENT)
Dept: LAB | Age: 40
End: 2020-10-21

## 2020-10-21 ENCOUNTER — PROCEDURE VISIT (OUTPATIENT)
Dept: NEUROLOGY | Age: 40
End: 2020-10-21
Payer: COMMERCIAL

## 2020-10-21 LAB
ALBUMIN SERPL-MCNC: 4.3 G/DL (ref 3.5–5.1)
ALP BLD-CCNC: 88 U/L (ref 38–126)
ALT SERPL-CCNC: 17 U/L (ref 11–66)
ANION GAP SERPL CALCULATED.3IONS-SCNC: 12 MEQ/L (ref 8–16)
AST SERPL-CCNC: 13 U/L (ref 5–40)
AVERAGE GLUCOSE: 108 MG/DL (ref 70–126)
BASOPHILS # BLD: 0.6 %
BASOPHILS ABSOLUTE: 0.1 THOU/MM3 (ref 0–0.1)
BILIRUB SERPL-MCNC: 0.4 MG/DL (ref 0.3–1.2)
BUN BLDV-MCNC: 14 MG/DL (ref 7–22)
CALCIUM SERPL-MCNC: 9.4 MG/DL (ref 8.5–10.5)
CHLORIDE BLD-SCNC: 106 MEQ/L (ref 98–111)
CHOLESTEROL, TOTAL: 170 MG/DL (ref 100–199)
CO2: 23 MEQ/L (ref 23–33)
CREAT SERPL-MCNC: 0.7 MG/DL (ref 0.4–1.2)
CREATININE, URINE: 96.7 MG/DL
EOSINOPHIL # BLD: 1.9 %
EOSINOPHILS ABSOLUTE: 0.2 THOU/MM3 (ref 0–0.4)
ERYTHROCYTE [DISTWIDTH] IN BLOOD BY AUTOMATED COUNT: 13.7 % (ref 11.5–14.5)
ERYTHROCYTE [DISTWIDTH] IN BLOOD BY AUTOMATED COUNT: 42.5 FL (ref 35–45)
FERRITIN: 95 NG/ML (ref 10–291)
GFR SERPL CREATININE-BSD FRML MDRD: > 90 ML/MIN/1.73M2
GLUCOSE BLD-MCNC: 87 MG/DL (ref 70–108)
HBA1C MFR BLD: 5.6 % (ref 4.4–6.4)
HCT VFR BLD CALC: 42.2 % (ref 37–47)
HDLC SERPL-MCNC: 45 MG/DL
HEMOGLOBIN: 13.5 GM/DL (ref 12–16)
IMMATURE GRANS (ABS): 0.03 THOU/MM3 (ref 0–0.07)
IMMATURE GRANULOCYTES: 0.3 %
IRON: 56 UG/DL (ref 50–170)
LDL CHOLESTEROL CALCULATED: 106 MG/DL
LYMPHOCYTES # BLD: 28.8 %
LYMPHOCYTES ABSOLUTE: 2.6 THOU/MM3 (ref 1–4.8)
MCH RBC QN AUTO: 27.2 PG (ref 26–33)
MCHC RBC AUTO-ENTMCNC: 32 GM/DL (ref 32.2–35.5)
MCV RBC AUTO: 84.9 FL (ref 81–99)
MICROALBUMIN UR-MCNC: < 1.2 MG/DL
MICROALBUMIN/CREAT UR-RTO: 12 MG/G (ref 0–30)
MONOCYTES # BLD: 4.8 %
MONOCYTES ABSOLUTE: 0.4 THOU/MM3 (ref 0.4–1.3)
NUCLEATED RED BLOOD CELLS: 0 /100 WBC
PLATELET # BLD: 204 THOU/MM3 (ref 130–400)
PMV BLD AUTO: 11.7 FL (ref 9.4–12.4)
POTASSIUM SERPL-SCNC: 4.1 MEQ/L (ref 3.5–5.2)
RBC # BLD: 4.97 MILL/MM3 (ref 4.2–5.4)
SEG NEUTROPHILS: 63.6 %
SEGMENTED NEUTROPHILS ABSOLUTE COUNT: 5.7 THOU/MM3 (ref 1.8–7.7)
SODIUM BLD-SCNC: 141 MEQ/L (ref 135–145)
TOTAL IRON BINDING CAPACITY: 312 UG/DL (ref 171–450)
TOTAL PROTEIN: 7.4 G/DL (ref 6.1–8)
TRIGL SERPL-MCNC: 97 MG/DL (ref 0–199)
TSH SERPL DL<=0.05 MIU/L-ACNC: 2.35 UIU/ML (ref 0.4–4.2)
VITAMIN B-12: 409 PG/ML (ref 211–911)
WBC # BLD: 9 THOU/MM3 (ref 4.8–10.8)

## 2020-10-21 PROCEDURE — 95910 NRV CNDJ TEST 7-8 STUDIES: CPT | Performed by: PSYCHIATRY & NEUROLOGY

## 2020-10-21 PROCEDURE — 95886 MUSC TEST DONE W/N TEST COMP: CPT | Performed by: PSYCHIATRY & NEUROLOGY

## 2020-10-22 ENCOUNTER — TELEPHONE (OUTPATIENT)
Dept: FAMILY MEDICINE CLINIC | Age: 40
End: 2020-10-22

## 2020-10-22 NOTE — TELEPHONE ENCOUNTER
----- Message from Claude Pilot, DO sent at 10/21/2020  6:19 PM EDT -----  Please let pt know that labs look good. Let me know if questions, thanks!

## 2020-10-25 ENCOUNTER — TELEPHONE (OUTPATIENT)
Dept: FAMILY MEDICINE CLINIC | Age: 40
End: 2020-10-25

## 2020-10-25 NOTE — TELEPHONE ENCOUNTER
Please let pt know that EMG shows mild nerve irritation on R side coming from her back    This may explain her symptoms    EMG otherwise looked ok    F/u as planned    Let me know if questions, thanks!     Future Appointments   Date Time Provider Junior Kim   11/17/2020  8:00 AM Lady Stephani DO [facility] [place] [facility]   1/12/2021  8:00 AM Richie ROCA [address] [place] [address],[address] [facility]

## 2020-10-26 NOTE — TELEPHONE ENCOUNTER
Left detailed message, ok per hipaa, advised to call back for any additional questions or concerns     Mychart message sent

## 2020-11-17 ENCOUNTER — OFFICE VISIT (OUTPATIENT)
Dept: FAMILY MEDICINE CLINIC | Age: 40
End: 2020-11-17
Payer: COMMERCIAL

## 2020-11-17 VITALS
RESPIRATION RATE: 14 BRPM | TEMPERATURE: 97.8 F | WEIGHT: 293 LBS | DIASTOLIC BLOOD PRESSURE: 70 MMHG | OXYGEN SATURATION: 100 % | HEART RATE: 76 BPM | SYSTOLIC BLOOD PRESSURE: 104 MMHG | BODY MASS INDEX: 43.4 KG/M2 | HEIGHT: 69 IN

## 2020-11-17 PROCEDURE — 90471 IMMUNIZATION ADMIN: CPT | Performed by: FAMILY MEDICINE

## 2020-11-17 PROCEDURE — 99214 OFFICE O/P EST MOD 30 MIN: CPT | Performed by: FAMILY MEDICINE

## 2020-11-17 PROCEDURE — 90686 IIV4 VACC NO PRSV 0.5 ML IM: CPT | Performed by: FAMILY MEDICINE

## 2020-11-17 RX ORDER — TIZANIDINE 4 MG/1
TABLET ORAL
Qty: 30 TABLET | Refills: 0 | Status: SHIPPED | OUTPATIENT
Start: 2020-11-17 | End: 2020-11-19 | Stop reason: ALTCHOICE

## 2020-11-17 RX ORDER — LOSARTAN POTASSIUM 50 MG/1
50 TABLET ORAL DAILY
Qty: 90 TABLET | Refills: 3 | Status: SHIPPED | OUTPATIENT
Start: 2020-11-17 | End: 2021-08-19 | Stop reason: SINTOL

## 2020-11-17 NOTE — PATIENT INSTRUCTIONS

## 2020-11-17 NOTE — PROGRESS NOTES
Immunization(s) given during visit:    Immunizations Administered     Name Date Dose Route    Influenza, Quadv, IM, PF (6 mo and older Fluzone, Flulaval, Fluarix, and 3 yrs and older Afluria) 11/17/2020 0.5 mL Intramuscular    Site: Deltoid- Right    Lot: [id number]    NDC: 43344-433-80          Most recent Vaccine Information Sheet dated 8/15/19 given to [address] [place] [address], \"Influenza - Inactivated\"  given to Neftaly Dunne, or parent/legal guardian of  Neftaly Dunne and verbalized understanding. Patient responses:    Have you ever had a reaction to a flu vaccine? No  Do you have an allergy to eggs, neomycin or polymixin? No  Do you have an allergy to Thimerosal, contact lens solution, or Merthiolate? No  Have you ever had Guillian Schertz Syndrome? No  Do you have any current illness? No  Do you have a temperature above 100 degrees? No  Are you pregnant? No  If pregnant, permission obtained from physician? No  Do you have an active neurological disorder? No      Flu vaccine given per order. Please see immunization tab.

## 2020-11-19 ENCOUNTER — HOSPITAL ENCOUNTER (EMERGENCY)
Age: 40
Discharge: HOME | End: 2020-11-19
Payer: COMMERCIAL

## 2020-11-19 ENCOUNTER — APPOINTMENT (OUTPATIENT)
Dept: CT IMAGING | Age: 40
End: 2020-11-19
Payer: COMMERCIAL

## 2020-11-19 VITALS
BODY MASS INDEX: 40.6 KG/M2 | TEMPERATURE: 97.9 F | WEIGHT: 290 LBS | DIASTOLIC BLOOD PRESSURE: 62 MMHG | SYSTOLIC BLOOD PRESSURE: 131 MMHG | HEART RATE: 62 BPM | RESPIRATION RATE: 14 BRPM | HEIGHT: 71 IN | OXYGEN SATURATION: 100 %

## 2020-11-19 LAB
ALBUMIN SERPL-MCNC: 4 G/DL (ref 3.5–5.1)
ALP BLD-CCNC: 88 U/L (ref 38–126)
ALT SERPL-CCNC: 17 U/L (ref 11–66)
ANION GAP SERPL CALCULATED.3IONS-SCNC: 11 MEQ/L (ref 8–16)
AST SERPL-CCNC: 13 U/L (ref 5–40)
BASOPHILS # BLD: 0.5 %
BASOPHILS ABSOLUTE: 0 THOU/MM3 (ref 0–0.1)
BILIRUB SERPL-MCNC: 0.3 MG/DL (ref 0.3–1.2)
BILIRUBIN DIRECT: < 0.2 MG/DL (ref 0–0.3)
BILIRUBIN URINE: NEGATIVE
BLOOD, URINE: NEGATIVE
BUN BLDV-MCNC: 18 MG/DL (ref 7–22)
CALCIUM SERPL-MCNC: 9.5 MG/DL (ref 8.5–10.5)
CHARACTER, URINE: CLEAR
CHLORIDE BLD-SCNC: 105 MEQ/L (ref 98–111)
CO2: 26 MEQ/L (ref 23–33)
COLOR: YELLOW
CREAT SERPL-MCNC: 0.7 MG/DL (ref 0.4–1.2)
EOSINOPHIL # BLD: 2 %
EOSINOPHILS ABSOLUTE: 0.2 THOU/MM3 (ref 0–0.4)
ERYTHROCYTE [DISTWIDTH] IN BLOOD BY AUTOMATED COUNT: 13.7 % (ref 11.5–14.5)
ERYTHROCYTE [DISTWIDTH] IN BLOOD BY AUTOMATED COUNT: 43.3 FL (ref 35–45)
GFR SERPL CREATININE-BSD FRML MDRD: > 90 ML/MIN/1.73M2
GLUCOSE BLD-MCNC: 94 MG/DL (ref 70–108)
GLUCOSE URINE: NEGATIVE MG/DL
HCT VFR BLD CALC: 41.2 % (ref 37–47)
HEMOGLOBIN: 13.1 GM/DL (ref 12–16)
IMMATURE GRANS (ABS): 0.02 THOU/MM3 (ref 0–0.07)
IMMATURE GRANULOCYTES: 0.3 %
KETONES, URINE: NEGATIVE
LEUKOCYTE ESTERASE, URINE: NEGATIVE
LIPASE: 35.8 U/L (ref 5.6–51.3)
LYMPHOCYTES # BLD: 31.7 %
LYMPHOCYTES ABSOLUTE: 2.4 THOU/MM3 (ref 1–4.8)
MAGNESIUM: 2 MG/DL (ref 1.6–2.4)
MCH RBC QN AUTO: 27.5 PG (ref 26–33)
MCHC RBC AUTO-ENTMCNC: 31.8 GM/DL (ref 32.2–35.5)
MCV RBC AUTO: 86.6 FL (ref 81–99)
MONOCYTES # BLD: 6 %
MONOCYTES ABSOLUTE: 0.5 THOU/MM3 (ref 0.4–1.3)
NITRITE, URINE: NEGATIVE
NUCLEATED RED BLOOD CELLS: 0 /100 WBC
OSMOLALITY CALCULATION: 284.8 MOSMOL/KG (ref 275–300)
PH UA: 5 (ref 5–9)
PLATELET # BLD: 163 THOU/MM3 (ref 130–400)
PMV BLD AUTO: 11.5 FL (ref 9.4–12.4)
POTASSIUM SERPL-SCNC: 4.6 MEQ/L (ref 3.5–5.2)
PROTEIN UA: NEGATIVE
RBC # BLD: 4.76 MILL/MM3 (ref 4.2–5.4)
SEG NEUTROPHILS: 59.5 %
SEGMENTED NEUTROPHILS ABSOLUTE COUNT: 4.5 THOU/MM3 (ref 1.8–7.7)
SODIUM BLD-SCNC: 142 MEQ/L (ref 135–145)
SPECIFIC GRAVITY, URINE: > 1.03 (ref 1–1.03)
TOTAL PROTEIN: 6.8 G/DL (ref 6.1–8)
UROBILINOGEN, URINE: 0.2 EU/DL (ref 0–1)
WBC # BLD: 7.5 THOU/MM3 (ref 4.8–10.8)

## 2020-11-19 PROCEDURE — 2580000003 HC RX 258: Performed by: PHYSICIAN ASSISTANT

## 2020-11-19 PROCEDURE — 80053 COMPREHEN METABOLIC PANEL: CPT

## 2020-11-19 PROCEDURE — 81003 URINALYSIS AUTO W/O SCOPE: CPT

## 2020-11-19 PROCEDURE — 82248 BILIRUBIN DIRECT: CPT

## 2020-11-19 PROCEDURE — 6360000004 HC RX CONTRAST MEDICATION: Performed by: PHYSICIAN ASSISTANT

## 2020-11-19 PROCEDURE — 99285 EMERGENCY DEPT VISIT HI MDM: CPT

## 2020-11-19 PROCEDURE — 74177 CT ABD & PELVIS W/CONTRAST: CPT

## 2020-11-19 PROCEDURE — 6360000002 HC RX W HCPCS: Performed by: PHYSICIAN ASSISTANT

## 2020-11-19 PROCEDURE — 76376 3D RENDER W/INTRP POSTPROCES: CPT

## 2020-11-19 PROCEDURE — 85025 COMPLETE CBC W/AUTO DIFF WBC: CPT

## 2020-11-19 PROCEDURE — 96374 THER/PROPH/DIAG INJ IV PUSH: CPT

## 2020-11-19 PROCEDURE — 96375 TX/PRO/DX INJ NEW DRUG ADDON: CPT

## 2020-11-19 PROCEDURE — 83735 ASSAY OF MAGNESIUM: CPT

## 2020-11-19 PROCEDURE — 36415 COLL VENOUS BLD VENIPUNCTURE: CPT

## 2020-11-19 PROCEDURE — 96361 HYDRATE IV INFUSION ADD-ON: CPT

## 2020-11-19 PROCEDURE — 83690 ASSAY OF LIPASE: CPT

## 2020-11-19 RX ORDER — PREDNISONE 20 MG/1
TABLET ORAL
Qty: 15 TABLET | Refills: 0 | Status: SHIPPED | OUTPATIENT
Start: 2020-11-19 | End: 2021-01-05

## 2020-11-19 RX ORDER — TRAMADOL HYDROCHLORIDE 50 MG/1
50 TABLET ORAL EVERY 6 HOURS PRN
Qty: 12 TABLET | Refills: 0 | Status: SHIPPED | OUTPATIENT
Start: 2020-11-19 | End: 2020-11-22

## 2020-11-19 RX ORDER — CYCLOBENZAPRINE HCL 10 MG
10 TABLET ORAL 3 TIMES DAILY PRN
Qty: 21 TABLET | Refills: 0 | Status: SHIPPED | OUTPATIENT
Start: 2020-11-19 | End: 2020-11-29

## 2020-11-19 RX ORDER — 0.9 % SODIUM CHLORIDE 0.9 %
1000 INTRAVENOUS SOLUTION INTRAVENOUS ONCE
Status: COMPLETED | OUTPATIENT
Start: 2020-11-19 | End: 2020-11-19

## 2020-11-19 RX ORDER — ONDANSETRON 2 MG/ML
4 INJECTION INTRAMUSCULAR; INTRAVENOUS ONCE
Status: COMPLETED | OUTPATIENT
Start: 2020-11-19 | End: 2020-11-19

## 2020-11-19 RX ORDER — KETOROLAC TROMETHAMINE 30 MG/ML
30 INJECTION, SOLUTION INTRAMUSCULAR; INTRAVENOUS ONCE
Status: COMPLETED | OUTPATIENT
Start: 2020-11-19 | End: 2020-11-19

## 2020-11-19 RX ADMIN — KETOROLAC TROMETHAMINE 30 MG: 30 INJECTION, SOLUTION INTRAMUSCULAR; INTRAVENOUS at 08:29

## 2020-11-19 RX ADMIN — ONDANSETRON 4 MG: 2 INJECTION INTRAMUSCULAR; INTRAVENOUS at 08:30

## 2020-11-19 RX ADMIN — SODIUM CHLORIDE 1000 ML: 9 INJECTION, SOLUTION INTRAVENOUS at 08:34

## 2020-11-19 RX ADMIN — IOPAMIDOL 80 ML: 755 INJECTION, SOLUTION INTRAVENOUS at 09:18

## 2020-11-19 ASSESSMENT — PAIN DESCRIPTION - PAIN TYPE
TYPE: ACUTE PAIN
TYPE: ACUTE PAIN

## 2020-11-19 ASSESSMENT — ENCOUNTER SYMPTOMS
SHORTNESS OF BREATH: 0
BACK PAIN: 1
ABDOMINAL PAIN: 1
CONSTIPATION: 0
NAUSEA: 0
DIARRHEA: 0
VOMITING: 0

## 2020-11-19 ASSESSMENT — PAIN SCALES - GENERAL
PAINLEVEL_OUTOF10: 8
PAINLEVEL_OUTOF10: 3
PAINLEVEL_OUTOF10: 8

## 2020-11-19 ASSESSMENT — PAIN DESCRIPTION - ORIENTATION
ORIENTATION: LOWER
ORIENTATION: LOWER

## 2020-11-19 ASSESSMENT — PAIN DESCRIPTION - DESCRIPTORS: DESCRIPTORS: SHARP;SHOOTING

## 2020-11-19 ASSESSMENT — PAIN DESCRIPTION - FREQUENCY
FREQUENCY: CONTINUOUS
FREQUENCY: CONTINUOUS

## 2020-11-19 ASSESSMENT — PAIN DESCRIPTION - LOCATION
LOCATION: BACK
LOCATION: BACK

## 2020-11-19 NOTE — ED TRIAGE NOTES
Patient presents to ER with complaints of lower back pain that radiates down right leg that started yesterday. Patient denies any known injury.
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
## 2021-01-05 ENCOUNTER — OFFICE VISIT (OUTPATIENT)
Dept: FAMILY MEDICINE CLINIC | Age: 41
End: 2021-01-05
Payer: COMMERCIAL

## 2021-01-05 VITALS
SYSTOLIC BLOOD PRESSURE: 126 MMHG | BODY MASS INDEX: 43.4 KG/M2 | OXYGEN SATURATION: 97 % | WEIGHT: 293 LBS | DIASTOLIC BLOOD PRESSURE: 82 MMHG | HEIGHT: 69 IN | HEART RATE: 90 BPM | TEMPERATURE: 98.5 F | RESPIRATION RATE: 12 BRPM

## 2021-01-05 DIAGNOSIS — G89.29 CHRONIC RIGHT-SIDED LOW BACK PAIN WITH RIGHT-SIDED SCIATICA: ICD-10-CM

## 2021-01-05 DIAGNOSIS — E89.0 S/P PARTIAL THYROIDECTOMY: Chronic | ICD-10-CM

## 2021-01-05 DIAGNOSIS — M51.36 DEGENERATIVE DISC DISEASE, LUMBAR: Primary | ICD-10-CM

## 2021-01-05 DIAGNOSIS — E66.01 MORBID OBESITY (HCC): Chronic | ICD-10-CM

## 2021-01-05 DIAGNOSIS — M54.41 CHRONIC RIGHT-SIDED LOW BACK PAIN WITH RIGHT-SIDED SCIATICA: ICD-10-CM

## 2021-01-05 DIAGNOSIS — I10 HYPERTENSION, ESSENTIAL: Chronic | ICD-10-CM

## 2021-01-05 DIAGNOSIS — E89.0 POSTOPERATIVE HYPOTHYROIDISM: Chronic | ICD-10-CM

## 2021-01-05 PROBLEM — M51.369 DEGENERATIVE DISC DISEASE, LUMBAR: Status: ACTIVE | Noted: 2021-01-05

## 2021-01-05 PROCEDURE — 99214 OFFICE O/P EST MOD 30 MIN: CPT | Performed by: FAMILY MEDICINE

## 2021-01-05 RX ORDER — CYCLOBENZAPRINE HCL 10 MG
10 TABLET ORAL 3 TIMES DAILY PRN
COMMUNITY
End: 2021-01-05 | Stop reason: ALTCHOICE

## 2021-01-05 RX ORDER — MELOXICAM 15 MG/1
15 TABLET ORAL DAILY PRN
Qty: 30 TABLET | Refills: 3 | Status: SHIPPED | OUTPATIENT
Start: 2021-01-05 | End: 2021-07-21

## 2021-01-05 RX ORDER — TIZANIDINE 4 MG/1
4 TABLET ORAL EVERY 8 HOURS PRN
Qty: 90 TABLET | Refills: 0 | Status: SHIPPED | OUTPATIENT
Start: 2021-01-05 | End: 2021-08-19 | Stop reason: SDUPTHER

## 2021-01-05 RX ORDER — TRAMADOL HYDROCHLORIDE 50 MG/1
50 TABLET ORAL EVERY 6 HOURS PRN
COMMUNITY
End: 2021-02-12

## 2021-01-05 ASSESSMENT — PATIENT HEALTH QUESTIONNAIRE - PHQ9
1. LITTLE INTEREST OR PLEASURE IN DOING THINGS: 0
SUM OF ALL RESPONSES TO PHQ QUESTIONS 1-9: 0
SUM OF ALL RESPONSES TO PHQ QUESTIONS 1-9: 0

## 2021-01-05 NOTE — LETTER
[address] [facility]  [address] [place] [address]. [place] [address]  Phone: [phone]  Fax: [phone] ,       January 5, 2021    Patient: Rex Lyn   YOB: 1980   Date of Visit: 1/5/2021       To Whom it May Concern:    Minnie Calhoun was seen in my clinic on 1/5/2021. She [facility] with plans to return to work on Friday, 08 JAN 2021. If you have any questions or concerns, please don't hesitate to call.       Sincerely,         Carmen Christine, DO

## 2021-01-05 NOTE — PATIENT INSTRUCTIONS
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
## 2021-01-07 ENCOUNTER — TELEPHONE (OUTPATIENT)
Dept: FAMILY MEDICINE CLINIC | Age: 41
End: 2021-01-07

## 2021-01-08 ENCOUNTER — HOSPITAL ENCOUNTER (OUTPATIENT)
Dept: PHYSICAL THERAPY | Age: 41
Setting detail: THERAPIES SERIES
Discharge: HOME | End: 2021-01-08
Payer: COMMERCIAL

## 2021-01-08 PROCEDURE — 97162 PT EVAL MOD COMPLEX 30 MIN: CPT
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
## 2021-01-13 ENCOUNTER — HOSPITAL ENCOUNTER (OUTPATIENT)
Dept: PHYSICAL THERAPY | Age: 41
Setting detail: THERAPIES SERIES
Discharge: HOME | End: 2021-01-13
Payer: COMMERCIAL

## 2021-01-13 PROCEDURE — 97032 APPL MODALITY 1+ESTIM EA 15: CPT

## 2021-01-13 PROCEDURE — 97110 THERAPEUTIC EXERCISES: CPT

## 2021-01-13 NOTE — PROGRESS NOTES
[address] [facility]  PHYSICAL THERAPY  [] EVALUATION  [x] DAILY NOTE (LAND) [] DAILY NOTE (AQUATIC ) [] PROGRESS NOTE [] DISCHARGE NOTE    [x] OUTPATIENT REHABILITATION [facility]   [] [place] [address]    [] [facility]   [] Aristeo Worrell    Date: 2021  Patient Name:  Lyssa Reyes  : 1980  MRN: [id number]  CSN: [id number]    Referring Practitioner Yeison David DO   Diagnosis Other intervertebral disc degeneration, lumbar region [M51.36]  Other chronic pain [G89.29]  Lumbago with sciatica, right side [M54.41]    Treatment Diagnosis Lumbago   Date of Evaluation 21    Additional Pertinent History High BP, Obesity, Arthritis      Functional Outcome Measure Used [facility]   Functional Outcome Score  (21)       Insurance: Primary: Payor: Valarie Pompa /  /  / ,   Secondary:    Authorization Information: Aquatics and modalities are covered. Visit # 2, 2/10 for progress note   Visits Allowed: , Pre certification required after the  visit. Recertification Date:    Physician Follow-Up: 2021   Physician Orders: Kaylan Corrales and treat   History of Present Illness: Pain in back worsening over the past year. SUBJECTIVE: Bernie Melendrez reports isn't feeling too bad today. Is off work today. TREATMENT   Precautions: High BP, Obesity, Arthritis   Pain: 3/10 lower back, right leg and right foot    X in shaded column indicates activity completed today   Modalities Parameters/  Location  Notes   Electrical Stimulation to low back with patient lying in prone 4 patches, IFC, intensity 12.5 x10 minutes X Pili reporting during estim she began to feel her foot.                  Manual Therapy Time/Technique  Notes                     Exercise/Intervention   Notes   SKTC, supine hamstring 3 x15 X    LTR 10  X    Abd bracing  10 5sec X    Pelvic tilt 10 2sec X    Abd bracing with alt UE, alt LE 10  X Specific Interventions Next Treatment: Modalities as needed for pain, gentle core stretching and strengthening    Activity/Treatment Tolerance:  [x]  Patient tolerated treatment well  []  Patient limited by fatigue  []  Patient limited by pain   []  Patient limited by medical complications  []  Other:     Assessment: Trialed estim today with patient reporting being able to feel right foot for a period of time during estim. GOALS:  Patient Goal: \"Be able to do my daily stuff with the pain. \"    Short Term Goals:  Time Frame: 4 weeks  1. Improve Hemant Skeens to 10/24 or less to assist with duties at work. 2. Decrease pain in low back and right leg to 3/10 at high to assist with sleeping at night. 3.  Improve posture needing no cues for correction to assist with preventing injury. 4.  Increase lumbar ROM to [facility] to assist with putting on shoes and socks. 5.  Increase core strength with no cues needed for abdominal bracing to assist with carrying groceries. Long Term Goals:  Time Frame: 12 weeks  1. Independent with HEP and with progression to assist with decreasing pain. Patient Education:   [x]  HEP/Education Completed: core stretches and strengthening, estim  ? Teachable Access Code:  []  No new Education completed  []  Reviewed Prior HEP      []  Patient verbalized and/or demonstrated understanding of education provided. []  Patient unable to verbalize and/or demonstrate understanding of education provided. Will continue education. [x]  Barriers to learning: none    PLAN:  Treatment Recommendations: Strengthening, Range of Motion, Functional Mobility Training, Gait Training, Stair Training, Pain Management, Home Exercise Program, Patient Education, Positioning, Aquatics and Modalities    []  Plan of care initiated. Plan to see patient 2 times per week for 12 weeks to address the treatment planned outlined above.   [x]  Continue with current plan of care []  Modify plan of care as follows:    []  Hold pending physician visit  []  Discharge    Time In 0830   Time Out 0910   Timed Code Minutes: 30 min   Total Treatment Time: 40 min       Electronically Signed by: Tarah Lau

## 2021-01-15 ENCOUNTER — HOSPITAL ENCOUNTER (OUTPATIENT)
Dept: PHYSICAL THERAPY | Age: 41
Setting detail: THERAPIES SERIES
Discharge: HOME | End: 2021-01-15
Payer: COMMERCIAL

## 2021-01-15 PROCEDURE — 97110 THERAPEUTIC EXERCISES: CPT

## 2021-01-15 PROCEDURE — G0283 ELEC STIM OTHER THAN WOUND: HCPCS

## 2021-01-15 NOTE — PROGRESS NOTES
[address] [facility]  PHYSICAL THERAPY  [] EVALUATION  [x] DAILY NOTE (LAND) [] DAILY NOTE (AQUATIC ) [] PROGRESS NOTE [] DISCHARGE NOTE    [x] OUTPATIENT REHABILITATION [facility]   [] [place] [address]    [] [facility]   [] Nader Pina    Date: 1/15/2021  Patient Name:  Janusz Pacheco  : 1980  MRN: [id number]  CSN: [id number]    Referring Practitioner Maisha Esteban DO   Diagnosis Other intervertebral disc degeneration, lumbar region [M51.36]  Other chronic pain [G89.29]  Lumbago with sciatica, right side [M54.41]    Treatment Diagnosis Lumbago   Date of Evaluation 21    Additional Pertinent History High BP, Obesity, Arthritis      Functional Outcome Measure Used [facility]   Functional Outcome Score  (21)       Insurance: Primary: Payor: Scout Gtz /  /  / ,   Secondary:    Authorization Information: Aquatics and modalities are covered. Visit # 3, 3/10 for progress note   Visits Allowed: 25, Pre certification required after the 25th visit. Recertification Date: 73   Physician Follow-Up: 2021   Physician Orders: Joceline Raymundo and treat   History of Present Illness: Pain in back worsening over the past year. SUBJECTIVE: Just got off work and back is not too bad today. Pain is located on right low back region that is constant. History of back surgery . At times pain can get so bad she has difficulty with walking when pain goes down her leg. Has good days and bad days with her back and leg symptoms. TREATMENT   Precautions: High BP, Obesity, Arthritis   Pain: 3/10 lower back, right leg and right foot    X in shaded column indicates activity completed today   Modalities Parameters/  Location  Notes   Electrical Stimulation to low back with patient lying in prone 4 patches, IFC, intensity 12.5 x10 minutes X Last 10 minutes prone with HP. First 20 minutes supine with exercises. Manual Therapy Time/Technique  Notes                     Exercise/Intervention   Notes   SKTC, supine hamstring 3 15 count X    LTR 10      Abd bracing  10 5sec X    Pelvic tilt 10 2sec X    Abd bracing with alt UE, alt LE 10  X    Bent knee opening 10  x    Marches, marching dead bugs 10  x                                  Specific Interventions Next Treatment: Modalities as needed for pain, gentle core stretching and strengthening    Activity/Treatment Tolerance: Pain level reduced to 2/10 during estim and pronelying  [x]  Patient tolerated treatment well  []  Patient limited by fatigue  []  Patient limited by pain   []  Patient limited by medical complications  []  Other:     Assessment:  Continued with interferential estim and flexibility core exercises. Progressed stabilization ex. Pain level decreased 2/10. GOALS:  Patient Goal: \"Be able to do my daily stuff with the pain. \"    Short Term Goals:  Time Frame: 4 weeks  1. Improve [place] to 10/24 or less to assist with duties at work. 2. Decrease pain in low back and right leg to 3/10 at high to assist with sleeping at night. 3.  Improve posture needing no cues for correction to assist with preventing injury. 4.  Increase lumbar ROM to [facility] to assist with putting on shoes and socks. 5.  Increase core strength with no cues needed for abdominal bracing to assist with carrying groceries. Long Term Goals:  Time Frame: 12 weeks  1. Independent with HEP and with progression to assist with decreasing pain. Patient Education:   [x]  HEP/Education Completed: core stretches and strengthening, estim  ? Veronica Access Code:  []  No new Education completed  []  Reviewed Prior HEP      []  Patient verbalized and/or demonstrated understanding of education provided. []  Patient unable to verbalize and/or demonstrate understanding of education provided. Will continue education.   [x]  Barriers to learning: none    PLAN: Treatment Recommendations: Strengthening, Range of Motion, Functional Mobility Training, Gait Training, Stair Training, Pain Management, Home Exercise Program, Patient Education, Positioning, Aquatics and Modalities    []  Plan of care initiated. Plan to see patient 2 times per week for 12 weeks to address the treatment planned outlined above.   [x]  Continue with current plan of care  []  Modify plan of care as follows:    []  Hold pending physician visit  []  Discharge    Time In 1429   Time Out 1500   Timed Code Minutes: 23   Total Treatment Time: 31       Electronically Signed by: Vj Xiao

## 2021-01-18 ENCOUNTER — HOSPITAL ENCOUNTER (OUTPATIENT)
Dept: PHYSICAL THERAPY | Age: 41
Setting detail: THERAPIES SERIES
Discharge: HOME | End: 2021-01-18
Payer: COMMERCIAL

## 2021-01-18 PROCEDURE — 97110 THERAPEUTIC EXERCISES: CPT

## 2021-01-18 PROCEDURE — G0283 ELEC STIM OTHER THAN WOUND: HCPCS

## 2021-01-18 NOTE — PROGRESS NOTES
[address] [facility]  PHYSICAL THERAPY  [x] DAILY NOTE (LAND) [] DAILY NOTE (AQUATIC ) [] PROGRESS NOTE [] DISCHARGE NOTE    [x] OUTPATIENT REHABILITATION CENTER - [place]   [] [place] [address]    [] [facility]   [] Lauren Purigomery    Date: 2021  Patient Name:  Venu Sadler  : 1980  MRN: [id number]  CSN: [id number]    Referring Practitioner Ashish Scott DO   Diagnosis Other intervertebral disc degeneration, lumbar region [M51.36]  Other chronic pain [G89.29]  Lumbago with sciatica, right side [M54.41]    Treatment Diagnosis Lumbago   Date of Evaluation 21    Additional Pertinent History High BP, Obesity, Arthritis      Functional Outcome Measure Used [facility]   Functional Outcome Score  (21)       Insurance: Primary: Payor: Gena Morfin /  /  / ,   Secondary:    Authorization Information: Aquatics and modalities are covered. Visit # 4, 4/10 for progress note   Visits Allowed: 25, Pre certification required after the 25th visit. Recertification Date: 62   Physician Follow-Up: 2021   Physician Orders: Sully Pac and treat   History of Present Illness: Pain in back worsening over the past year. SUBJECTIVE: Patient reporting having to do more running around at work today and reporting low back and right leg pain down to foot is 5/10. TREATMENT   Precautions: High BP, Obesity, Arthritis   Pain: 3/10 lower back, right leg and right foot    X in shaded column indicates activity completed today   Modalities Parameters/  Location  Notes   Electrical Stimulation to low back with patient lying in prone 4 patches, IFC, intensity 14.5 x15 minutes. Increased to intensity 16.5 X Less sensation on right side low back but still able to feel patch.  Patient moving from prone during to left side lying for comfort               Manual Therapy Time/Technique  Notes                     Exercise/Intervention   Notes

## 2021-01-21 ENCOUNTER — HOSPITAL ENCOUNTER (OUTPATIENT)
Dept: PHYSICAL THERAPY | Age: 41
Setting detail: THERAPIES SERIES
Discharge: HOME | End: 2021-01-21
Payer: COMMERCIAL

## 2021-01-21 PROCEDURE — 97032 APPL MODALITY 1+ESTIM EA 15: CPT

## 2021-01-21 PROCEDURE — 97110 THERAPEUTIC EXERCISES: CPT

## 2021-01-21 NOTE — PROGRESS NOTES
Double knees to chest (with towel) 1x 10 seconds X Patient initially felt a good stretch then started to have pain so stopped   Sciatic Nerve Glide (hamstring stretch with ankle pumps) 15x Bilateral  X    Abdominal bracing  10x 5 seconds X    Pelvic tilt 10x 5 seconds X    Abdominal bracing with alt UE, alt LE, alt UE/LE combo 10x  X    Hooklying hip abduction bilateral and unilateral 10x each  X    Straight leg raises with  bracing 10x bilateral   X                                  Specific Interventions Next Treatment: Modalities as needed for pain, gentle core stretching and strengthening    Activity/Treatment Tolerance: Pain level reduced to 4/10 following estim  [x]  Patient tolerated treatment well  []  Patient limited by fatigue  [x]  Patient limited by pain   []  Patient limited by medical complications  []  Other:     Assessment:  E-stim and heat on while patient was completing exercises today. Patient tolerated progression of exercises today and pain was 1-2/10 at end of session. Complaints of the feeling of a \"ball\" pushing into her back while completing exercises. GOALS:  Patient Goal: \"Be able to do my daily stuff with the pain. \"    Short Term Goals:  Time Frame: 4 weeks  1. Improve [place] to 10/24 or less to assist with duties at work. 2. Decrease pain in low back and right leg to 3/10 at high to assist with sleeping at night. 3.  Improve posture needing no cues for correction to assist with preventing injury. 4.  Increase lumbar ROM to [facility] to assist with putting on shoes and socks. 5.  Increase core strength with no cues needed for abdominal bracing to assist with carrying groceries. Long Term Goals:  Time Frame: 12 weeks  1. Independent with HEP and with progression to assist with decreasing pain. Patient Education:   [x]  HEP/Education Completed: monitor pain after progression of exercises  ?  SpectraLinear Access Code:  []  No new Education completed []  Reviewed Prior HEP      []  Patient verbalized and/or demonstrated understanding of education provided. []  Patient unable to verbalize and/or demonstrate understanding of education provided. Will continue education. [x]  Barriers to learning: none    PLAN:  Treatment Recommendations: Strengthening, Range of Motion, Functional Mobility Training, Gait Training, Stair Training, Pain Management, Home Exercise Program, Patient Education, Positioning, Aquatics and Modalities      Plan to see patient 2 times per week for 12 weeks to address the treatment planned outlined above.   [x]  Continue with current plan of care  []  Modify plan of care as follows:    []  Hold pending physician visit  []  Discharge    Time In 1500   Time Out 1542   Timed Code Minutes: 42 min   Total Treatment Time: 42 min       Electronically Signed by: Ravinder Barksdale

## 2021-01-25 ENCOUNTER — HOSPITAL ENCOUNTER (OUTPATIENT)
Dept: PHYSICAL THERAPY | Age: 41
Setting detail: THERAPIES SERIES
Discharge: HOME | End: 2021-01-25
Payer: COMMERCIAL

## 2021-01-25 PROCEDURE — 97110 THERAPEUTIC EXERCISES: CPT

## 2021-01-25 PROCEDURE — 97035 APP MDLTY 1+ULTRASOUND EA 15: CPT

## 2021-01-25 NOTE — PROGRESS NOTES
[address] [facility]  PHYSICAL THERAPY  [x] DAILY NOTE (LAND) [] DAILY NOTE (AQUATIC ) [] PROGRESS NOTE [] DISCHARGE NOTE    [x] OUTPATIENT REHABILITATION CENTER [facility]   [] [place] [address]    [] [facility]   [] Caridad Upton    Date: 2021  Patient Name:  Estefania Rg  : 1980  MRN: [id number]  CSN: [id number]    Referring Practitioner Stuart Peña DO   Diagnosis Other intervertebral disc degeneration, lumbar region [M51.36]  Other chronic pain [G89.29]  Lumbago with sciatica, right side [M54.41]    Treatment Diagnosis Lumbago   Date of Evaluation 21    Additional Pertinent History High BP, Obesity, Arthritis      Functional Outcome Measure Used [facility]   Functional Outcome Score  (21)       Insurance: Primary: Payor: Jean Hernandez /  /  / ,   Secondary:    Authorization Information: Aquatics and modalities are covered. Visit # 5, 5/10 for progress note   Visits Allowed: 25, Pre certification required after the  visit. Recertification Date: 16   Physician Follow-Up: 2021   Physician Orders: Criss Zuleta and treat   History of Present Illness: Pain in back worsening over the past year. SUBJECTIVE: Patient reports she is not noticing any carryover with the estim. TREATMENT   Precautions: High BP, Obesity, Arthritis   Pain: 3/10 lower back, \"numbness\" at Right toes 2nd and 3rd digits    X in shaded column indicates activity completed today   Modalities Parameters/  Location  Notes   Electrical Stimulation with Moist heat to low back in hooklying  4 patches, IFC, intensity 14.0 x20 minutes. While completing exercises. Ultrasound to right low back with patient lying in prone 3MHz, 50%, 0.8w/cm2 x8 minutes X Patient reporting noticing no difference after.    Manual Therapy Time/Technique  Notes               Exercise/Intervention   Notes SKTC, supine hamstring, piriformis (knee to opposite shoulder) 3x Bilateral 15-20 seconds X (with towel)   Double knees to chest (with towel) 1x 10 seconds  Patient initially felt a good stretch then started to have pain so stopped   Sciatic Nerve Glide (hamstring stretch with ankle pumps) 15x Bilateral  X    Abdominal bracing  10x 5 seconds X    Pelvic tilt 10x 5 seconds X    Abdominal bracing with alt UE, alt LE, alt UE/LE combo 10x  X    Hooklying hip abduction bilateral and unilateral 10x each  X    Straight leg raises with  bracing 10x bilateral   X                                  Specific Interventions Next Treatment: Modalities as needed for pain, gentle core stretching and strengthening    Activity/Treatment Tolerance: Pain level reduced to 4/10 following estim  [x]  Patient tolerated treatment well  []  Patient limited by fatigue  [x]  Patient limited by pain   []  Patient limited by medical complications  []  Other:     Assessment:  Held estim today and tried ultrasound to right low back. Shanda Batch not noticing any difference in pain levels or numbness. GOALS:  Patient Goal: \"Be able to do my daily stuff with the pain. \"    Short Term Goals:  Time Frame: 4 weeks  1. Improve Hemant Skeens to 10/24 or less to assist with duties at work. 2. Decrease pain in low back and right leg to 3/10 at high to assist with sleeping at night. 3.  Improve posture needing no cues for correction to assist with preventing injury. 4.  Increase lumbar ROM to [facility] to assist with putting on shoes and socks. 5.  Increase core strength with no cues needed for abdominal bracing to assist with carrying groceries. Long Term Goals:  Time Frame: 12 weeks  1. Independent with HEP and with progression to assist with decreasing pain. Patient Education:   [x]  HEP/Education Completed: monitor pain after trial of ultrasound  ?  TierPM Access Code:  []  No new Education completed  []  Reviewed Prior HEP []  Patient verbalized and/or demonstrated understanding of education provided. []  Patient unable to verbalize and/or demonstrate understanding of education provided. Will continue education. [x]  Barriers to learning: none    PLAN:  Treatment Recommendations: Strengthening, Range of Motion, Functional Mobility Training, Gait Training, Stair Training, Pain Management, Home Exercise Program, Patient Education, Positioning, Aquatics and Modalities      Plan to see patient 2 times per week for 12 weeks to address the treatment planned outlined above.   [x]  Continue with current plan of care  []  Modify plan of care as follows:    []  Hold pending physician visit  []  Discharge    Time In 1445   Time Out 1520   Timed Code Minutes: 35 min   Total Treatment Time: 35 min       Electronically Signed by: Andre Liu

## 2021-01-28 ENCOUNTER — HOSPITAL ENCOUNTER (OUTPATIENT)
Dept: PHYSICAL THERAPY | Age: 41
Setting detail: THERAPIES SERIES
Discharge: HOME | End: 2021-01-28
Payer: COMMERCIAL

## 2021-01-28 PROCEDURE — 97035 APP MDLTY 1+ULTRASOUND EA 15: CPT

## 2021-01-28 PROCEDURE — 97110 THERAPEUTIC EXERCISES: CPT

## 2021-01-28 NOTE — PROGRESS NOTES
7[address] [facility]  PHYSICAL THERAPY  [x] DAILY NOTE (LAND) [] DAILY NOTE (AQUATIC ) [] PROGRESS NOTE [] DISCHARGE NOTE    [x] OUTPATIENT REHABILITATION CENTER [facility]   [] [place] [address]    [] [facility]   [] Carloz Picking    Date: 2021  Patient Name:  Flores Gambino  : 1980  MRN: [id number]  CSN: [id number]    Referring Practitioner Mike Nesbitt DO   Diagnosis Other intervertebral disc degeneration, lumbar region [M51.36]  Other chronic pain [G89.29]  Lumbago with sciatica, right side [M54.41]    Treatment Diagnosis Lumbago   Date of Evaluation 21    Additional Pertinent History High BP, Obesity, Arthritis      Functional Outcome Measure Used [facility]   Functional Outcome Score  (21)       Insurance: Primary: Payor: Sherwin Carey /  /  / ,   Secondary:    Authorization Information: Aquatics and modalities are covered. Visit # 6, 6/10 for progress note   Visits Allowed: 25, Pre certification required after the  visit. Recertification Date: 32   Physician Follow-Up: 2021   Physician Orders: Ofelia Schwarz and treat   History of Present Illness: Pain in back worsening over the past year. SUBJECTIVE: Patient states the numbness is still present in the 2nd and 3rd toes and that doesn't change. Patient wasn't sure if the ultrasound helped or not last session but pain didn't get worse after. TREATMENT   Precautions: High BP, Obesity, Arthritis   Pain: 3/10 lower back, \"numbness\" at Right toes 2nd and 3rd digits    X in shaded column indicates activity completed today   Modalities Parameters/  Location  Notes   Electrical Stimulation with Moist heat to low back in hooklying  4 patches, IFC, intensity 14.0 x20 minutes. While completing exercises. Ultrasound to right low back with patient lying in prone 3MHz, 50%, 0.8w/cm2 x8 minutes X Patient reporting noticing no difference after.    Manual Therapy Time/Technique  Notes Exercise/Intervention   Notes   SKTC, supine hamstring, piriformis (knee to opposite shoulder) 3x Bilateral 15-20 seconds X (with towel)   Double knees to chest (with towel) 1x 10 seconds  Patient initially felt a good stretch then started to have pain so stopped   Sciatic Nerve Glide (hamstring stretch with ankle pumps) 15x Bilateral  X    Lower trunk rotation 10x 5 seconds X    Abdominal bracing  15x 5 seconds X    Pelvic tilt 15x 5 seconds X    Abdominal bracing with alt UE, alt LE, alt UE/LE combo 15x  X    Hooklying hip abduction bilateral and unilateral 15x each  X    Straight leg raises with  bracing 10x bilateral   X                                  Specific Interventions Next Treatment: Modalities as needed for pain, gentle core stretching and strengthening    Activity/Treatment Tolerance: Pain level reduced to 4/10 following estim  [x]  Patient tolerated treatment well  []  Patient limited by fatigue  [x]  Patient limited by pain   []  Patient limited by medical complications  []  Other:     Assessment:   Second trial of ultrasound completed today. Patient did not notice any relief. If no change after second ultrasound treatment, plan to hold next session. Increased repetitions as able. Numbness and pain remained the same at end of session. GOALS:  Patient Goal: \"Be able to do my daily stuff with the pain. \"    Short Term Goals:  Time Frame: 4 weeks  1. Improve Eugenio Blow to 10/24 or less to assist with duties at work. 2. Decrease pain in low back and right leg to 3/10 at high to assist with sleeping at night. 3.  Improve posture needing no cues for correction to assist with preventing injury. 4.  Increase lumbar ROM to [facility] to assist with putting on shoes and socks. 5.  Increase core strength with no cues needed for abdominal bracing to assist with carrying groceries.         Long Term Goals:  Time Frame: 12 weeks 1.  Independent with HEP and with progression to assist with decreasing pain. Patient Education:   [x]  HEP/Education Completed: monitor pain after trial of ultrasound  ? Vart[employer] Access Code:  []  No new Education completed  []  Reviewed Prior HEP      []  Patient verbalized and/or demonstrated understanding of education provided. []  Patient unable to verbalize and/or demonstrate understanding of education provided. Will continue education. [x]  Barriers to learning: none    PLAN:  Treatment Recommendations: Strengthening, Range of Motion, Functional Mobility Training, Gait Training, Stair Training, Pain Management, Home Exercise Program, Patient Education, Positioning, Aquatics and Modalities      Plan to see patient 2 times per week for 12 weeks to address the treatment planned outlined above.   [x]  Continue with current plan of care  []  Modify plan of care as follows:    []  Hold pending physician visit  []  Discharge    Time In 1430   Time Out 1510   Timed Code Minutes: 40 min   Total Treatment Time: 40 min       Electronically Signed by: Edmundo Tierney

## 2021-02-01 ENCOUNTER — HOSPITAL ENCOUNTER (OUTPATIENT)
Dept: PHYSICAL THERAPY | Age: 41
Setting detail: THERAPIES SERIES
Discharge: HOME | End: 2021-02-01
Payer: COMMERCIAL

## 2021-02-01 PROCEDURE — 97110 THERAPEUTIC EXERCISES: CPT

## 2021-02-01 NOTE — PROGRESS NOTES
[facility]  PHYSICAL THERAPY  [x] DAILY NOTE (LAND) [] DAILY NOTE (AQUATIC ) [] PROGRESS NOTE [] DISCHARGE NOTE    [x] OUTPATIENT REHABILITATION CENTER [facility]   [] [place] [address]    [] [facility]   [] [facility] [place]on    Date: 2021  Patient Name:  Stalin Rios  : 1980  MRN: [id number]  CSN: [id number]    Referring Practitioner Maria Antonia Reyes DO   Diagnosis Other intervertebral disc degeneration, lumbar region [M51.36]  Other chronic pain [G89.29]  Lumbago with sciatica, right side [M54.41]    Treatment Diagnosis Lumbago   Date of Evaluation 21    Additional Pertinent History High BP, Obesity, Arthritis      Functional Outcome Measure Used Silvestre Clements   Functional Outcome Score  (21)       Insurance: Primary: Payor: Patria Crowder /  /  / ,   Secondary:    Authorization Information: Aquatics and modalities are covered. Visit # 7, 7/10 for progress note   Visits Allowed: 25, Pre certification required after the 25th visit. Recertification Date: 62   Physician Follow-Up: 2021   Physician Orders: Makenna Kebede and treat   History of Present Illness: Pain in back worsening over the past year. SUBJECTIVE: Patient states she really didn't notice a difference with the ultrasound. TREATMENT   Precautions: High BP, Obesity, Arthritis   Pain: 3/10 lower back, \"numbness\" at Right toes 2nd and 3rd digits    X in shaded column indicates activity completed today   Modalities Parameters/  Location  Notes   Electrical Stimulation with Moist heat to low back in hooklying  4 patches, IFC, intensity 14.0 x20 minutes. While completing exercises. Ultrasound to right low back with patient lying in prone 3MHz, 50%, 0.8w/cm2 x8 minutes  Patient reporting noticing no difference after.    Manual Therapy Time/Technique  Notes               Exercise/Intervention   Notes SKTC, supine hamstring, piriformis (knee to opposite shoulder) 3x Bilateral 15-20 seconds X (with towel)   Double knees to chest (with towel) 1x 10 seconds  Patient initially felt a good stretch then started to have pain so stopped   Sciatic Nerve Glide (hamstring stretch with ankle pumps) 15x Bilateral  X    Lower trunk rotation 10x 5 seconds X    Abdominal bracing  15x 5 seconds X    Pelvic tilt 15x 5 seconds X    Abdominal bracing with alt UE, alt LE, alt UE/LE combo 15x  X    Hooklying hip abduction bilateral and unilateral 15x each  X    Straight leg raises with  Bracing, hip abduction 10x bilateral   X    3-way hip bilateral 15x  X    NuStep S11,A10 level 1 5 minutes  X                    Specific Interventions Next Treatment: Modalities as needed for pain, gentle core stretching and strengthening    Activity/Treatment Tolerance: Pain level reduced to 4/10 following estim  [x]  Patient tolerated treatment well  []  Patient limited by fatigue  [x]  Patient limited by pain   []  Patient limited by medical complications  []  Other:     Assessment:   Held all modalities due to patient not noticing much difference. No difference in pain with completing exercises only today. GOALS:  Patient Goal: \"Be able to do my daily stuff with the pain. \"    Short Term Goals:  Time Frame: 4 weeks  1. Improve Authur Croft to 10/24 or less to assist with duties at work. 2. Decrease pain in low back and right leg to 3/10 at high to assist with sleeping at night. 3.  Improve posture needing no cues for correction to assist with preventing injury. 4.  Increase lumbar ROM to [facility] to assist with putting on shoes and socks. 5.  Increase core strength with no cues needed for abdominal bracing to assist with carrying groceries. Long Term Goals:  Time Frame: 12 weeks  1. Independent with HEP and with progression to assist with decreasing pain.       Patient Education:   ? [x]  HEP/Education Completed: 3-way hip, NuStep ? [facility] Access Code:  []  No new Education completed  []  Reviewed Prior HEP      []  Patient verbalized and/or demonstrated understanding of education provided. []  Patient unable to verbalize and/or demonstrate understanding of education provided. Will continue education. [x]  Barriers to learning: none    PLAN:  Treatment Recommendations: Strengthening, Range of Motion, Functional Mobility Training, Gait Training, Stair Training, Pain Management, Home Exercise Program, Patient Education, Positioning, Aquatics and Modalities      Plan to see patient 2 times per week for 12 weeks to address the treatment planned outlined above.   [x]  Continue with current plan of care  []  Modify plan of care as follows:    []  Hold pending physician visit  []  Discharge    Time In 1445   Time Out 1523   Timed Code Minutes: 38 min   Total Treatment Time: 38 min       Electronically Signed by: Citlalli Louise

## 2021-02-04 ENCOUNTER — HOSPITAL ENCOUNTER (OUTPATIENT)
Dept: PHYSICAL THERAPY | Age: 41
Setting detail: THERAPIES SERIES
Discharge: HOME | End: 2021-02-04
Payer: COMMERCIAL

## 2021-02-04 PROCEDURE — 97110 THERAPEUTIC EXERCISES: CPT

## 2021-02-04 NOTE — PROGRESS NOTES
[facility]  PHYSICAL THERAPY  [x] DAILY NOTE (LAND) [] DAILY NOTE (AQUATIC ) [] PROGRESS NOTE [] DISCHARGE NOTE    [x] OUTPATIENT REHABILITATION CENTER [facility]   [] [place] [address]    [] [facility]   [] Estuardo Archuleta    Date: 2021  Patient Name:  Arleth James  : 1980  MRN: [id number]  CSN: [id number]    Referring Practitioner Mandi Diane DO   Diagnosis Other intervertebral disc degeneration, lumbar region [M51.36]  Other chronic pain [G89.29]  Lumbago with sciatica, right side [M54.41]    Treatment Diagnosis Lumbago   Date of Evaluation 21    Additional Pertinent History High BP, Obesity, Arthritis      Functional Outcome Measure Used [facility]   Functional Outcome Score  (21)       Insurance: Primary: Payor: Jocelyne Jamison 150 /  /  / ,   Secondary:    Authorization Information: Aquatics and modalities are covered. Visit # 8, 8/10 for progress note   Visits Allowed: 25, Pre certification required after the 25th visit. Recertification Date: 24   Physician Follow-Up: 2021   Physician Orders: Heidi Lim and treat   History of Present Illness: Pain in back worsening over the past year. SUBJECTIVE: Patient states she is doing the exercises 2 times per day at home and is using heat. Reports pain is still about the same but she hasn't had any major flare-ups in about 2 weeks. Numbness in toes never changes. TREATMENT   Precautions: High BP, Obesity, Arthritis   Pain: 3/10 lower back, \"numbness\" at Right toes 2nd and 3rd digits    X in shaded column indicates activity completed today   Modalities Parameters/  Location  Notes   Moist heat to lower back while completing supine exercises  X    Electrical Stimulation with Moist heat to low back in hooklying  4 patches, IFC, intensity 14.0 x20 minutes. While completing exercises. Ultrasound to right low back with patient lying in prone 3MHz, 50%, 0.8w/cm2 x8 minutes  Patient reporting noticing no difference after. Manual Therapy Time/Technique  Notes               Exercise/Intervention   Notes   SKTC, supine hamstring, piriformis (knee to opposite shoulder) 3x Bilateral 15-20 seconds X (with towel)   Double knees to chest (with towel) 1x 10 seconds  Patient initially felt a good stretch then started to have pain so stopped   Sciatic Nerve Glide (hamstring stretch with ankle pumps) 15x Bilateral  X    Lower trunk rotation 10x 5 seconds X           Abdominal bracing  15x 5 seconds X    Pelvic tilt 15x 5 seconds X    Abdominal bracing with alt UE, alt LE, alt UE/LE combo 15x  X    Hooklying hip abduction bilateral and unilateral 15x each  X    Straight leg raises with Bracing, hip abduction 15x bilateral   X    Bridging 6x 3 seconds X Started to have sharp pain so stopped          3-way hip bilateral 15x  X    NuStep (Seat  11/Arms 10) level 1  5 minutes X                    Specific Interventions Next Treatment: Modalities as needed for pain, gentle core stretching and strengthening    Activity/Treatment Tolerance: Pain level reduced to 4/10 following estim  [x]  Patient tolerated treatment well  []  Patient limited by fatigue  [x]  Patient limited by pain   []  Patient limited by medical complications  []  Other:     Assessment:   Added heat during supine exercises today because patient was feeling pressure and tightness in the lower back. No increased pain noted with exercises today. Good recall of stretches. GOALS:  Patient Goal: \"Be able to do my daily stuff with the pain. \"    Short Term Goals:  Time Frame: 4 weeks  1. Improve Marigene Lian to 10/24 or less to assist with duties at work. 2. Decrease pain in low back and right leg to 3/10 at high to assist with sleeping at night. 3.  Improve posture needing no cues for correction to assist with preventing injury. 4.  [facility] to assist with putting on shoes and socks. 5.  Increase core strength with no cues needed for abdominal bracing to assist with carrying groceries. Long Term Goals:  Time Frame: 12 weeks  1. Independent with HEP and with progression to assist with decreasing pain. Patient Education:   []  HEP/Education Completed: 3-way hip, NuStep  ? [employer] Access Code:  []  No new Education completed  [x]  Reviewed Prior HEP      []  Patient verbalized and/or demonstrated understanding of education provided. []  Patient unable to verbalize and/or demonstrate understanding of education provided. Will continue education. [x]  Barriers to learning: none    PLAN:  Treatment Recommendations: Strengthening, Range of Motion, Functional Mobility Training, Gait Training, Stair Training, Pain Management, Home Exercise Program, Patient Education, Positioning, Aquatics and Modalities      Plan to see patient 2 times per week for 12 weeks to address the treatment planned outlined above.   [x]  Continue with current plan of care  []  Modify plan of care as follows:    []  Hold pending physician visit  []  Discharge    Time In 1433   Time Out 1515   Timed Code Minutes: 42 min   Total Treatment Time: 42 min       Electronically Signed by: Arielle Benavidez

## 2021-02-08 ENCOUNTER — HOSPITAL ENCOUNTER (OUTPATIENT)
Dept: PHYSICAL THERAPY | Age: 41
Setting detail: THERAPIES SERIES
Discharge: HOME | End: 2021-02-08
Payer: COMMERCIAL

## 2021-02-08 PROCEDURE — 97110 THERAPEUTIC EXERCISES: CPT

## 2021-02-08 NOTE — PROGRESS NOTES
Ultrasound to right low back with patient lying in prone 3MHz, 50%, 0.8w/cm2 x8 minutes  Patient reporting noticing no difference after. Manual Therapy Time/Technique  Notes               Exercise/Intervention   Notes   SKTC, supine hamstring, piriformis (knee to opposite shoulder) 3x Bilateral 15-20 seconds  (with towel)   Double knees to chest (with towel) 1x 10 seconds  Patient initially felt a good stretch then started to have pain so stopped   Sciatic Nerve Glide (hamstring stretch with ankle pumps) 15x Bilateral      Lower trunk rotation 10x 5 seconds            Abdominal bracing  15x 5 seconds     Pelvic tilt 15x 5 seconds     Abdominal bracing with alt UE, alt LE, alt UE/LE combo 15x      Hooklying hip abduction bilateral and unilateral 15x each      Straight leg raises with Bracing, hip abduction 15x bilateral       Bridging 6x 3 seconds  Started to have sharp pain so stopped          3-way hip bilateral 15x      NuStep (Seat  11/Arms 10) level 1  5 minutes                     Specific Interventions Next Treatment: Modalities as needed for pain, gentle core stretching and strengthening  Rom Hemant Disability Scale for Low Back Pain   I stay at home most of the time because of the pain in my back. No   I change position frequently to try and make my back comfortable. Yes   I walk more slowly than usual because of the pain in my back. Yes   Because of the pain in my back, I am not doing any of the jobs that I usually do around the house. No   Because of the pain in my back, I use a handrail to get upstairs. Yes   Because of the pain in my back, I lie down to rest more often. Yes   Because of the pain in my back, I have to hold onto something to get out of a reclining chair. Yes   Because of the pain in my back, I ask other people to do things for me. No   I get dressed more slowly than usual because of the pain in my back.  Yes I only stand up for short periods of time because of the pain in my back. No   Because of the pain in my back, I try not to bend or kneel down. Yes   I find it difficult to get out of a chair because of the pain in my back. Yes   My back hurts most of the time. Yes   I find it difficult to turn over in bed because of the pain in my back. Yes   My appetite is not very good because of the pain in my back. No   I have trouble putting on my socks (or stockings) because of the pain in my back. No   I only walk short distances because of the pain in my back. No   I sleep less because of the pain in my back. Yes   Because of the pain in my back, I get dressed with help from someone else. No   I sit down most of the day because of the pain in my back. No   I avoid heavy jobs around the house because of the pain in my back. No   Because of the pain in my back, I am more irritable and bad tempered with people. No   Because of the pain in my back, I go upstairs more slowly than usual. Yes   I stay in bed most of the time because of the pain in my back. Yes   TOTAL NUMBER OF YES RESPONSES 13/24        Activity/Treatment Tolerance: Pain level reduced to 4/10 following estim  [x]  Patient tolerated treatment well  []  Patient limited by fatigue  [x]  Patient limited by pain   []  Patient limited by medical complications  []  Other:     Assessment:   Reassessment completed today. Score on Marjan Zarate remained the same. She has had fewer episodes of flare ups of pain, is well aware of posture. ROM is still decreased, strength has improved. Plan to continue working with Sherron Hurley until she sees her doctor in March. GOALS:  Patient Goal: \"Be able to do my daily stuff with the pain. \"    Short Term Goals:  Time Frame: 4 weeks  1. Improve Marjan Zarate to 10/24 or less to assist with duties at work. NOT MET: Marjan Zarate 13/24. Continue. 2. Decrease pain in low back and right leg to 3/10 at high to assist with sleeping at night. NOT MET: Pain in low back and right leg was a 10/10 on Saturday night after over doing it. Did admit she had 2 weeks without any flare up of pain. Continue. 3.  Improve posture needing no cues for correction to assist with preventing injury. MET: Jeri Pantoja is more aware of posture. Is aware of proper lifting techniques. Continue. 4.  Increase lumbar ROM to [facility] to assist with putting on shoes and socks. NOT MET: Lumbar ROM =25% loss. Continue. 5.  Increase core strength with no cues needed for abdominal bracing to assist with carrying groceries. MET: Improved core strength with no cues needed for abdominal bracing. Does have some pulling in back. NEW GOAL: Jeri Pantoja able to tolerate all strengthening exercises in therapy maintaining abdominal bracing and no increased pain. Long Term Goals:  Time Frame: 12 weeks  1. Independent with HEP and with progression to assist with decreasing pain. MET: Completing HEP 2 times per day. Ongoing. Patient Education:   ? [x]  HEP/Education Completed:Continue with HEP  ? [employer] Access Code:  []  No new Education completed  [x]  Reviewed Prior HEP      []  Patient verbalized and/or demonstrated understanding of education provided. []  Patient unable to verbalize and/or demonstrate understanding of education provided. Will continue education. [x]  Barriers to learning: none    PLAN:  Treatment Recommendations: Strengthening, Range of Motion, Functional Mobility Training, Gait Training, Stair Training, Pain Management, Home Exercise Program, Patient Education, Positioning, Aquatics and Modalities      Plan to see patient 2 times per week for 12 weeks to address the treatment planned outlined above.   [x]  Continue with current plan of care  []  Modify plan of care as follows:    []  Hold pending physician visit  []  Discharge    Time In 1445   Time Out 8156 Timed Code Minutes: 30 min   Total Treatment Time: 30 min       Electronically Signed by: Lm Steward

## 2021-02-11 ENCOUNTER — HOSPITAL ENCOUNTER (OUTPATIENT)
Dept: PHYSICAL THERAPY | Age: 41
Setting detail: THERAPIES SERIES
End: 2021-02-11
Payer: COMMERCIAL

## 2021-02-11 ENCOUNTER — PATIENT MESSAGE (OUTPATIENT)
Dept: FAMILY MEDICINE CLINIC | Age: 41
End: 2021-02-11

## 2021-02-11 DIAGNOSIS — R50.9 FEVER, UNSPECIFIED FEVER CAUSE: ICD-10-CM

## 2021-02-11 DIAGNOSIS — Z20.822 SUSPECTED COVID-19 VIRUS INFECTION: Primary | ICD-10-CM

## 2021-02-11 NOTE — TELEPHONE ENCOUNTER
Will order test  Can she do VV tomorrow to make sure she's good before the weekend? Isolate at home pending results of testing    Thanks! Diagnosis Orders   1. Suspected COVID-19 virus infection  COVID-19 Ambulatory   2.  Fever, unspecified fever cause  COVID-19 Ambulatory

## 2021-02-11 NOTE — TELEPHONE ENCOUNTER
From: Walter Kelly  To: Rah Kelly DO  Sent: 2/11/2021 3:30 PM EST  Subject: Test Results Question    I'm having covid19 symptoms and running a fever of 101.3. I would like to request to get tested please. I have also been exposed, my parents tested positive about a week ago. Thank you,  Marleni Sheikh.

## 2021-02-12 ENCOUNTER — HOSPITAL ENCOUNTER (OUTPATIENT)
Age: 41
Setting detail: SPECIMEN
Discharge: HOME | End: 2021-02-12
Payer: COMMERCIAL

## 2021-02-12 ENCOUNTER — VIRTUAL VISIT (OUTPATIENT)
Dept: FAMILY MEDICINE CLINIC | Age: 41
End: 2021-02-12
Payer: COMMERCIAL

## 2021-02-12 VITALS — RESPIRATION RATE: 16 BRPM

## 2021-02-12 DIAGNOSIS — R06.2 WHEEZING: ICD-10-CM

## 2021-02-12 DIAGNOSIS — Z20.822 SUSPECTED COVID-19 VIRUS INFECTION: Primary | ICD-10-CM

## 2021-02-12 PROCEDURE — 99213 OFFICE O/P EST LOW 20 MIN: CPT | Performed by: FAMILY MEDICINE

## 2021-02-12 PROCEDURE — U0003 INFECTIOUS AGENT DETECTION BY NUCLEIC ACID (DNA OR RNA); SEVERE ACUTE RESPIRATORY SYNDROME CORONAVIRUS 2 (SARS-COV-2) (CORONAVIRUS DISEASE [COVID-19]), AMPLIFIED PROBE TECHNIQUE, MAKING USE OF HIGH THROUGHPUT TECHNOLOGIES AS DESCRIBED BY CMS-2020-01-R: HCPCS

## 2021-02-12 RX ORDER — ALBUTEROL SULFATE 90 UG/1
2 AEROSOL, METERED RESPIRATORY (INHALATION) EVERY 4 HOURS PRN
Qty: 1 INHALER | Refills: 1 | Status: SHIPPED | OUTPATIENT
Start: 2021-02-12 | End: 2021-08-18

## 2021-02-12 NOTE — PROGRESS NOTES
Runny nose or congestion -  Yes   Cough -  Yes  Sore throat -  Yes  Headache, fatigue, joint pains, muscle aches -  No  Double Sickening - No  Shortness of breath/Wheezing? -  As above  Nausea/Vomiting/Diarrhea? No  Sick contacts - Yes  Maxillary Tooth Pain -  No  Treatment tried and response - otc meds, no better      Age/Gender Health Maintenance    Lipid -   Lab Results   Component Value Date    CHOL 170 10/21/2020    CHOL 200 (H) 11/06/2019     Lab Results   Component Value Date    TRIG 97 10/21/2020    TRIG 122 11/06/2019     Lab Results   Component Value Date    HDL 45 10/21/2020    HDL 48 11/06/2019     Lab Results   Component Value Date    LDLCALC 106 10/21/2020    [address] 128 11/06/2019     No results found for: LABVLDL, VLDL  No results found for: CHOLHDLRATIO      DM Screen -   Lab Results   Component Value Date    GLUCOSE 94 11/19/2020     TSH -   Lab Results   Component Value Date    TSH 2.350 10/21/2020       Colon Cancer Screening - age 46  Lung Cancer Screening (Age 54 to [de-identified] with 30 pack year hx, current smoker or quit within past 15 years) - never smoker.      Tetanus - UTD 2007  Influenza Vaccine - UTD FALL 2020  Pneumonia Vaccine - age 70  Zoster - age 46     Breast Cancer Screening - ordered OCT 2020  Cervical Cancer Screening - hyst for benign reasons  Osteoporosis Screening - age 59      Current Outpatient Medications   Medication Sig Dispense Refill    albuterol sulfate HFA (VENTOLIN HFA) 108 (90 Base) MCG/ACT inhaler Inhale 2 puffs into the lungs every 4 hours as needed for Wheezing or Shortness of Breath 1 Inhaler 1    tiZANidine (ZANAFLEX) 4 MG tablet Take 1 tablet by mouth every 8 hours as needed (for back pain/spasm) 90 tablet 0    meloxicam (MOBIC) 15 MG tablet Take 1 tablet by mouth daily as needed for Pain 30 tablet 3    losartan (COZAAR) 50 MG tablet Take 1 tablet by mouth daily 90 tablet 3    levothyroxine (SYNTHROID) 25 MCG tablet TAKE 1 TABLET BY MOUTH DAILY 90 tablet 3 No current facility-administered medications for this visit. Orders Placed This Encounter   Medications    albuterol sulfate HFA (VENTOLIN HFA) 108 (90 Base) MCG/ACT inhaler     Sig: Inhale 2 puffs into the lungs every 4 hours as needed for Wheezing or Shortness of Breath     Dispense:  1 Inhaler     Refill:  1         All medications reviewed and reconciled, including OTC and herbal medications. Updated list given to patient. Patient Active Problem List   Diagnosis    Patellofemoral stress syndrome of right knee    Right knee meniscal tear    Osteochondral defect of patella, right    Morbid obesity ([address]    Multiple thyroid nodules    Adrenal adenoma; bilateral. Stable and benign on CT    Hypothyroidism, postoperative    Hypertension, essential    S/P partial thyroidectomy    Chronic right-sided low back pain with right-sided sciatica    Degenerative disc disease, lumbar       Past Medical History:   Diagnosis Date    Adrenal adenoma; bilateral. Stable and benign on CT     Hypertension, essential 11/11/2019    Hypothyroidism, postoperative     Morbid obesity ([address]     S/P partial thyroidectomy 11/11/2019       Past Surgical History:   Procedure Laterality Date    BACK SURGERY  2009    post aa    CARPAL TUNNEL RELEASE Right     DILATION AND CURETTAGE OF UTERUS      KNEE ARTHROSCOPY Right 11/05/2015    Medial Menesectomy with Chrondroplasty - Dr Duane Caraballo N/A 3/9/2018    DIAGNOSTIC LAPAROSCOPY, APPENDECTOMY, infarcted appendix plancha performed by Hiram Chirinos MD at [address] [place] [address] Street  age 19    endometriosis.      THYROID LOBECTOMY Right 10/2019    [facility]    TONSILLECTOMY         Allergies   Allergen Reactions    Tape Fabio Monica Tape] Rash       Social History     Tobacco Use    Smoking status: Never Smoker    Smokeless tobacco: Never Used   Substance Use Topics    Alcohol use: No       Family History   Problem Relation Age of Onset  Colon Cancer Paternal Grandmother 80    Breast Cancer Maternal Grandmother 72    High Blood Pressure Mother     Diabetes Father     Ovarian Cancer Neg Hx     Tuberculosis Neg Hx          I have reviewed the patient's past medical history, past surgical history, allergies, medications, social and family history and I have made updates where appropriate. Review of Systems  Positive responses are highlighted in bold    Constitutional:  Fever, Chills, Night Sweats, Fatigue, Unexpected changes in weight  HENT:  Ear pain, Tinnitus, Nosebleeds, Trouble swallowing, Hearing loss, Sore throat  Cardiovascular:  Chest Pain, Palpitations, Orthopnea, Paroxysmal Nocturnal Dyspnea  Respiratory:  Cough, Wheezing, Shortness of breath, Chest tightness, Apnea  Gastrointestinal:  Nausea, Vomiting, Diarrhea, Constipation, Heartburn, Blood in stool  Genitourinary:  Difficulty or painful urination, Flank pain, Change in frequency, Urgency  Skin:  Color change, Rash, Itching, Wound  Musculoskeletal:  Joint pain, Back pain, Gait problems, Joint swelling, Myalgias  Neurological:  Dizziness, Headaches, Presyncope, Numbness, Seizures, Tremors  Endocrine:  Heat Intolerance, Cold Intolerance, Polydipsia, Polyphagia, Polyuria      PHYSICAL EXAM:  Not all vitals able to be obtained, video visit  Patient-Reported Vitals 2/12/2021   Patient-Reported Pulse 90   Patient-Reported Temperature 98.3      Vitals:    02/12/21 1026   Resp: 16     Pain Score: 2(Throat)    General Appearance: A&O x 3, No acute distress,well developed and well- nourished  Head: normocephalic and atraumatic  Eyes: pupils equal, round, and reactive to light, extraocular eye movements intact, conjunctivae and eye lids without erythema  ENT: External ears w/o redness, external nares without redness or discharge. Hearing is intact. Lips are w/o lesion. Teeth are in good repair. Pulmonary/Chest: normal respiratory effort. Normal respiratory rate. No respiratory distress . Skin: warm and dry, no rash or erythema to visible areas. Psych: Affect appropriate. Mood euthymic. Thought process is normal without evidence of depression or psychosis. Good insight and appropriate interaction. Cognition and memory appear to be intact. ASSESSMENT & PLAN  1. Suspected COVID-19 virus infection    Suspect covid  Hx and limited exam reassuring for OP management  Fluids  Rest  Tylenol  Albuterol  covid swab  Isolation pending results  F/u if no better  Reviewed ER precautions, pt understands. - albuterol sulfate HFA (VENTOLIN HFA) 108 (90 Base) MCG/ACT inhaler; Inhale 2 puffs into the lungs every 4 hours as needed for Wheezing or Shortness of Breath  Dispense: 1 Inhaler; Refill: 1    2. Wheezing    - albuterol sulfate HFA (VENTOLIN HFA) 108 (90 Base) MCG/ACT inhaler; Inhale 2 puffs into the lungs every 4 hours as needed for Wheezing or Shortness of Breath  Dispense: 1 Inhaler; Refill: 1      DISPOSITION    Return if symptoms worsen or fail to improve. Ev Fowler released with work restrictions. Future Appointments   Date Time Provider Department Center   2/15/2021  3:30 PM [address] [place] [address], PT [facility]   2/18/2021  4:00 PM Ivon Akers PTA [facility] [place] [facility]""   2/22/2021  2:30 PM [address] [place] [address], PT [facility]   2/25/2021  3:15 PM Maile Che PTA [facility] [place] [facility]   3/1/2021  2:45 PM [address] [place] [address], PT [facility]   3/2/2021  9:00 AM Christian Mo DO [address]     PATIENT COUNSELING    Barriers to learning and self management: none    Discussed use, benefit, and side effects of prescribed medications. Barriers to medication compliance addressed. All patient questions answered. Pt voiced understanding.        Electronically signed by Christian Mo DO on 2/12/2021 at 10:26 AM

## 2021-02-14 LAB — SARS-COV-2: NOT DETECTED

## 2021-02-15 ENCOUNTER — TELEPHONE (OUTPATIENT)
Dept: FAMILY MEDICINE CLINIC | Age: 41
End: 2021-02-15

## 2021-02-15 ENCOUNTER — HOSPITAL ENCOUNTER (OUTPATIENT)
Dept: PHYSICAL THERAPY | Age: 41
Setting detail: THERAPIES SERIES
End: 2021-02-15
Payer: COMMERCIAL

## 2021-02-15 NOTE — TELEPHONE ENCOUNTER
----- Message from Lea Huitron DO sent at 2/14/2021  8:54 PM EST -----  Please let pt know that COVID testing is negative. Let me know if questions, thanks!

## 2021-02-18 ENCOUNTER — HOSPITAL ENCOUNTER (OUTPATIENT)
Dept: PHYSICAL THERAPY | Age: 41
Setting detail: THERAPIES SERIES
Discharge: HOME | End: 2021-02-18
Payer: COMMERCIAL

## 2021-02-18 PROCEDURE — 97110 THERAPEUTIC EXERCISES: CPT

## 2021-02-18 NOTE — PROGRESS NOTES
[address] [facility]  PHYSICAL THERAPY  [x] DAILY NOTE (LAND) [] DAILY NOTE (AQUATIC ) [] PROGRESS NOTE [] DISCHARGE NOTE    [x] OUTPATIENT REHABILITATION CENTER [facility]   [] [place] [address]    [] [facility]   [] Jesús Valderrama    Date: 2021  Patient Name:  Ruddy Tran  : 1980  MRN: [id number]  CSN: [id number]    Referring Practitioner Ricky Quinn,    Diagnosis Other intervertebral disc degeneration, lumbar region [M51.36]  Other chronic pain [G89.29]  Lumbago with sciatica, right side [M54.41]    Treatment Diagnosis Lumbago   Date of Evaluation 21    Additional Pertinent History High BP, Obesity, Arthritis      Functional Outcome Measure Used [facility]   Functional Outcome Score  (21);  (21)       Insurance: Primary: Payor: Talat Torres /  /  / ,   Secondary:    Authorization Information: Aquatics and modalities are covered. Visit # 10,  for progress note   Visits Allowed: 25, Pre certification required after the 25th visit. Recertification Date: 3/2/77   Physician Follow-Up: 2021   Physician Orders: Neetu Sellers and treat   History of Present Illness: Pain in back worsening over the past year. SUBJECTIVE:Pt stated she hasn't had a flare up in back for about 1week. Pt did have the flu and has been resting a lot. Pt has been compliant with HEP. Pt to returns to work tomorrow. TREATMENT   Precautions: High BP, Obesity, Arthritis   Pain: 3/10 lower back, \"numbness\" at Right toes 2nd and 3rd digits    X in shaded column indicates activity completed today   Modalities Parameters/  Location  Notes   Moist heat to lower back while completing supine exercises      Electrical Stimulation with Moist heat to low back in hooklying  4 patches, IFC, intensity 14.0 x20 minutes. While completing exercises. Ultrasound to right low back with patient lying in prone 3MHz, 50%, 0.8w/cm2 x8 minutes  Patient reporting noticing no difference after. Manual Therapy Time/Technique  Notes               Exercise/Intervention   Notes   SKTC, supine hamstring, piriformis (knee to opposite shoulder) 3x Bilateral 15-20 seconds x (with towel)   Double knees to chest (with towel) 3x 15 seconds x    Manual Sciatic Nerve Glide (hamstring stretch with ankle pumps) 15sec 3x x R LE only   Lower trunk rotation 10x 5 seconds            Abdominal bracing  10x 5 seconds x    Pelvic tilt 15x 5 seconds x    Abdominal bracing with alt UE, alt LE, alt UE/LE combo 15x  x    Hooklying hip abduction bilateral and unilateral 15x each Peach tband x    Straight leg raises with Bracing, hip abduction 15x bilateral   x    clams 10x  x    Bridging 6x 3 seconds  Started to have sharp pain so stopped          3-way hip bilateral 10x  x    Standing: heel to raises, marching, mini squats 10x  x                  NuStep (Seat  11/Arms 10) level 3  5 minutes x                     Activity/Treatment Tolerance:   [x]  Patient tolerated treatment well  []  Patient limited by fatigue  [x]  Patient limited by pain   []  Patient limited by medical complications  []  Other:     Assessment:   Pt continues to work on dynamic lumbar stabilization exercises. Small progressions made since patient just got over having the flu. Added standing exercises at beginning of session then performed stretches to alleviate tightness. Pt does get ball like feeling in R side of low back with stretches. GOALS:  Patient Goal: \"Be able to do my daily stuff with the pain. \"    Short Term Goals:  Time Frame: 4 weeks  1. Improve Bradford Ruffing to 10/24 or less to assist with duties at work. 2. Decrease pain in low back and right leg to 3/10 at high to assist with sleeping at night. 3.  Improve posture needing no cues for correction to assist with preventing injury. 4.  Increase lumbar ROM to [facility] to assist with putting on shoes and socks. 5.  Devora Abreu able to tolerate all strengthening exercises in therapy maintaining abdominal bracing and no increased pain. Long Term Goals:  Time Frame: 12 weeks  1. Independent with HEP and with progression to assist with decreasing pain. Patient Education:   ? [x]  HEP/Education Completed:Clams added to program for strengthening  ? [employer] Access Code:  []  No new Education completed  []  Reviewed Prior HEP      [x]  Patient verbalized and/or demonstrated understanding of education provided. []  Patient unable to verbalize and/or demonstrate understanding of education provided. Will continue education. [x]  Barriers to learning: none    PLAN:  Treatment Recommendations: Strengthening, Range of Motion, Functional Mobility Training, Gait Training, Stair Training, Pain Management, Home Exercise Program, Patient Education, Positioning, Aquatics and Modalities      Plan to see patient 2 times per week for 12 weeks to address the treatment planned outlined above.   [x]  Continue with current plan of care  []  Modify plan of care as follows:    []  Hold pending physician visit  []  Discharge    Time In 1600   Time Out 1640   Timed Code Minutes: 40 min   Total Treatment Time: 40 min       Electronically Signed by: Janet Basilio

## 2021-02-22 ENCOUNTER — HOSPITAL ENCOUNTER (OUTPATIENT)
Dept: PHYSICAL THERAPY | Age: 41
Setting detail: THERAPIES SERIES
Discharge: HOME | End: 2021-02-22
Payer: COMMERCIAL

## 2021-02-22 PROCEDURE — 97110 THERAPEUTIC EXERCISES: CPT

## 2021-02-22 NOTE — PROGRESS NOTES
[address] [facility]  PHYSICAL THERAPY  [x] DAILY NOTE (LAND) [] DAILY NOTE (AQUATIC ) [] PROGRESS NOTE [] DISCHARGE NOTE    [x] OUTPATIENT REHABILITATION CENTER [facility]   [] [place] [address]    [] [facility]   [] John Brantleya    Date: 2021  Patient Name:  Walter Kelly  : 1980  MRN: [id number]  CSN: [id number]    Referring Practitioner Stephen South DO   Diagnosis Other intervertebral disc degeneration, lumbar region [M51.36]  Other chronic pain [G89.29]  Lumbago with sciatica, right side [M54.41]    Treatment Diagnosis Lumbago   Date of Evaluation 21    Additional Pertinent History High BP, Obesity, Arthritis      Functional Outcome Measure Used [facility]   Functional Outcome Score  (21);  (21)       Insurance: Primary: Payor: Shaka Garcia /  /  / ,   Secondary:    Authorization Information: Aquatics and modalities are covered. Visit # 11,  for progress note   Visits Allowed: 25, Pre certification required after the 25th visit. Recertification Date: 51   Physician Follow-Up: 2021   Physician Orders: Ajay Reads and treat   History of Present Illness: Pain in back worsening over the past year. SUBJECTIVE: Patient reports since she has been back to work her back has flared up some but not bad. TREATMENT   Precautions: High BP, Obesity, Arthritis   Pain: 3/10 lower back, \"numbness\" at Right toes 2nd and 3rd digits    X in shaded column indicates activity completed today   Modalities Parameters/  Location  Notes   Moist heat to lower back while completing supine exercises      Electrical Stimulation with Moist heat to low back in hooklying  4 patches, IFC, intensity 14.0 x20 minutes. While completing exercises. Ultrasound to right low back with patient lying in prone 3MHz, 50%, 0.8w/cm2 x8 minutes  Patient reporting noticing no difference after.    Manual Therapy Time/Technique  Notes Exercise/Intervention   Notes   SKTC, supine hamstring, piriformis (knee to opposite shoulder) 3x Bilateral 15-20 seconds x (with towel)   Double knees to chest (with towel) 3x 15 seconds x    Manual Sciatic Nerve Glide (hamstring stretch with ankle pumps) 15sec 3x x R LE only   Lower trunk rotation 10x 2 seconds x           Abdominal bracing  10x 5 seconds x    Pelvic tilt 15x 5 seconds x    Abdominal bracing with alt UE, alt LE, alt UE/LE combo 15x  x    Hooklying hip abduction bilateral and unilateral 15x each Peach tband x    Straight leg raises with Bracing, hip abduction 15x bilateral   x    clams 10x  x    Bridging 6x 3 seconds  Started to have sharp pain so stopped          3-way hip bilateral 10x  x    Standing: heel toe raises, marching, mini squats, hamstring curl 10x  x           Hydrostick front to back and side to side 10x  x    NuStep (Seat  11/Arms 10) level 3  5 minutes x                     Activity/Treatment Tolerance:   [x]  Patient tolerated treatment well  []  Patient limited by fatigue  [x]  Patient limited by pain   []  Patient limited by medical complications  []  Other:     Assessment:   Continues to have numbness in toes. A little pain with hydrostick. When at end of session, she had no change in symptoms. GOALS:  Patient Goal: \"Be able to do my daily stuff with the pain. \"    Short Term Goals:  Time Frame: 4 weeks  1. Improve Vanessa Sol to 10/24 or less to assist with duties at work. 2. Decrease pain in low back and right leg to 3/10 at high to assist with sleeping at night. 3.  Improve posture needing no cues for correction to assist with preventing injury. 4.  Increase lumbar ROM to [facility] to assist with putting on shoes and socks. 5.  Denjens Kiran able to tolerate all strengthening exercises in therapy maintaining abdominal bracing and no increased pain.         Long Term Goals:  Time Frame: 12 weeks 1.  Independent with HEP and with progression to assist with decreasing pain. Patient Education:   ? [x]  HEP/Education Completed:Hydrostick  ? [employer] Access Code:  []  No new Education completed  []  Reviewed Prior HEP      [x]  Patient verbalized and/or demonstrated understanding of education provided. []  Patient unable to verbalize and/or demonstrate understanding of education provided. Will continue education. [x]  Barriers to learning: none    PLAN:  Treatment Recommendations: Strengthening, Range of Motion, Functional Mobility Training, Gait Training, Stair Training, Pain Management, Home Exercise Program, Patient Education, Positioning, Aquatics and Modalities      Plan to see patient 2 times per week for 12 weeks to address the treatment planned outlined above.   [x]  Continue with current plan of care  []  Modify plan of care as follows:    []  Hold pending physician visit  []  Discharge    Time In 1430   Time Out 1510   Timed Code Minutes: 40 min   Total Treatment Time: 40 min       Electronically Signed by: Irena Martinez

## 2021-02-25 ENCOUNTER — HOSPITAL ENCOUNTER (OUTPATIENT)
Dept: PHYSICAL THERAPY | Age: 41
Setting detail: THERAPIES SERIES
Discharge: HOME | End: 2021-02-25
Payer: COMMERCIAL

## 2021-02-25 PROCEDURE — 97110 THERAPEUTIC EXERCISES: CPT

## 2021-02-25 NOTE — PROGRESS NOTES
Ultrasound to right low back with patient lying in prone 3MHz, 50%, 0.8w/cm2 x8 minutes  Patient reporting noticing no difference after. Manual Therapy Time/Technique  Notes               Exercise/Intervention   Notes   SKTC, supine hamstring, piriformis (knee to opposite shoulder) 3x Bilateral 15-20 seconds X (with towel)   Double knees to chest (with towel) 3x 15 seconds X    Manual Sciatic Nerve Glide (hamstring stretch with ankle pumps) 3x 15 ankle pumps X Right  LE only   Lower trunk rotation 10x 2 seconds X           Abdominal bracing  10x 5 seconds X    Pelvic tilt 15x 5 seconds X    Abdominal bracing with alt UE, alt LE, alt UE/LE combo 15x  X    Hooklying hip abduction bilateral and unilateral 15x each Peach tband X    Straight leg raises with Bracing, hip abduction 15x bilateral   X    Sidelying clamshell 10x bilateral  X    Bridging 6x 3 seconds  Started to have sharp pain so stopped          3-way hip bilateral 10x      Standing: heel toe raises, marching, mini squats, hamstring curl 10x  x           Hydrostick front to back and side to side 10x      NuStep (Seat  11/Arms 10) level 3  5 minutes X With moist heat behind back                    Activity/Treatment Tolerance:   [x]  Patient tolerated treatment well  []  Patient limited by fatigue  [x]  Patient limited by pain   []  Patient limited by medical complications  []  Other:     Assessment:   Placed heat at lower back today during supine exercises due to patient having more pain today. Held standing activities today due to elevated pain levels. Patient felt less tight but pain remained 5/10 at end of session. GOALS:  Patient Goal: \"Be able to do my daily stuff with the pain. \"    Short Term Goals:  Time Frame: 4 weeks  1. Improve Ehsan Borne to 10/24 or less to assist with duties at work. 2. Decrease pain in low back and right leg to 3/10 at high to assist with sleeping at night. 3.  Improve posture needing no cues for correction to assist with preventing injury. 4.  [facility] to [facility] with putting on shoes and socks. 5.  Tracee Cord able to tolerate all strengthening exercises in therapy maintaining abdominal bracing and no increased pain. Long Term Goals:  Time Frame: 12 weeks  1. Independent with HEP and with progression to assist with decreasing pain. Patient Education:   []  HEP/Education Completed:Hydrostick  ? [employer] Access Code:  []  No new Education completed  [x]  Reviewed Prior HEP      [x]  Patient verbalized and/or demonstrated understanding of education provided. []  Patient unable to verbalize and/or demonstrate understanding of education provided. Will continue education. [x]  Barriers to learning: none    PLAN:  Treatment Recommendations: Strengthening, Range of Motion, Functional Mobility Training, Gait Training, Stair Training, Pain Management, Home Exercise Program, Patient Education, Positioning, Aquatics and Modalities      Plan to see patient 2 times per week for 12 weeks to address the treatment planned outlined above.   [x]  Continue with current plan of care  []  Modify plan of care as follows:    []  Hold pending physician visit  []  Discharge    Time In 1515   Time Out 1555   Timed Code Minutes: 40 min   Total Treatment Time: 40 min       Electronically Signed by: Eula Koehler

## 2021-02-26 NOTE — LETTER
[address] [facility]  [address] [facility] [place] [address]  Phone: [phone]  Fax: [address] , DO      August 6, 2020    Patient: Analisa Francois   YOB: 1980   Date of Visit: 8/6/2020       To Whom it May Concern:    Patrica Muñiz was seen in my clinic on 8/6/2020. She may return to work tomrrow w/o restrictions other than wearing a R wrist brace. If you have any questions or concerns, please don't hesitate to call.       Sincerely,         Cody Stallworth, DO Never

## 2021-03-01 ENCOUNTER — HOSPITAL ENCOUNTER (OUTPATIENT)
Dept: PHYSICAL THERAPY | Age: 41
Setting detail: THERAPIES SERIES
Discharge: HOME | End: 2021-03-01
Payer: COMMERCIAL

## 2021-03-01 PROCEDURE — 97110 THERAPEUTIC EXERCISES: CPT

## 2021-03-01 NOTE — PROGRESS NOTES
[facility]  PHYSICAL THERAPY  [] DAILY NOTE (LAND) [] DAILY NOTE (AQUATIC ) [x] PROGRESS NOTE [] DISCHARGE NOTE    [x] OUTPATIENT REHABILITATION CENTER [facility]   [] [place] [address]    [] [facility]   [] [place]    Date: 3/1/2021  Patient Name:  Shravan Saucedo  : 1980  MRN: [id number]  CSN: [id number]    Referring Practitioner Faisal Hester DO   Diagnosis Other intervertebral disc degeneration, lumbar region [M51.36]  Other chronic pain [G89.29]  Lumbago with sciatica, right side [M54.41]    Treatment Diagnosis Lumbago   Date of Evaluation 21    Additional Pertinent History High BP, Obesity, Arthritis      Functional Outcome Measure Used [facility]   Functional Outcome Score  (21);  (21) ;  (3/1/21)      Insurance: Primary: Payor: Kalpesh Bautista /  /  / ,   Secondary:    Authorization Information: Aquatics and modalities are covered. Visit # 13,  for progress note   Visits Allowed: 25, Pre certification required after the 25th visit. Recertification Date: 18   Physician Follow-Up: 2021   Physician Orders: Miguel Wilde and treat   History of Present Illness: Pain in back worsening over the past year. SUBJECTIVE: Patient states she is still having the numbness in foot and that hasn't changed. Her back is improved and overall is having fewer episodes of flare ups in the back. She is returning to Dr. Kimani Dowell tomorrow. TREATMENT   Precautions: High BP, Obesity, Arthritis   Pain: 3/10 lower back, \"numbness\" at Right toes 2nd and 3rd digits    X in shaded column indicates activity completed today   Modalities Parameters/  Location  Notes   Moist heat to lower back while completing supine exercises      Electrical Stimulation with Moist heat to low back in hooklying  4 patches, IFC, intensity 14.0 x20 minutes. While completing exercises. I only stand up for short periods of time because of the pain in my back. No   Because of the pain in my back, I try not to bend or kneel down. Yes   I find it difficult to get out of a chair because of the pain in my back. Yes   My back hurts most of the time. Yes   I find it difficult to turn over in bed because of the pain in my back. Yes   My appetite is not very good because of the pain in my back. No   I have trouble putting on my socks (or stockings) because of the pain in my back. No   I only walk short distances because of the pain in my back. No   I sleep less because of the pain in my back. Yes   Because of the pain in my back, I get dressed with help from someone else. No   I sit down most of the day because of the pain in my back. No   I avoid heavy jobs around the house because of the pain in my back. No   Because of the pain in my back, I am more irritable and bad tempered with people. No   Because of the pain in my back, I go upstairs more slowly than usual. Yes   I stay in bed most of the time because of the pain in my back. Yes   TOTAL NUMBER OF YES RESPONSES 12/24          Activity/Treatment Tolerance:   [x]  Patient tolerated treatment well  []  Patient limited by fatigue  [x]  Patient limited by pain   []  Patient limited by medical complications  []  Other:     Assessment:  Reassessment completed today. Karan Rizvi had 1 point improvement on [employer]. She continues to have numbness in foot. Her flare ups of back pain have become less frequent. Has increased pain with lumbar extension. At this time, plan to put Karan Rizvi on hold and have her follow up with doctor. This therapist concerned with constant numbness she is having in foot which has not changed. GOALS:  Patient Goal: \"Be able to do my daily stuff with the pain. \"    Short Term Goals:  Time Frame: 4 weeks  1. Improve TXU Faraz to 10/24 or less to assist with duties at work. NOT MET: TXU Faraz 12/24. 2. Decrease pain in low back and right leg to 3/10 at high to assist with sleeping at night. NOT MET: Pain has been an 8/10 at high recently. 3.  Improve posture needing no cues for correction to assist with preventing injury. NOT MET :Jasson Keys needing minimal cues for posture correction during therapy. 4.  Increase lumbar ROM to [facility] to assist with putting on shoes and socks. NOT MET: Lumbar ROM flexion and extension=25% loss with increased pain with lumbar extension. 5.  Jasson Keys able to tolerate all strengthening exercises in therapy maintaining abdominal bracing and no increased pain. NOT MET: Bridging and hip extension increase pain and numbness. Long Term Goals:  Time Frame: 12 weeks  1. Independent with HEP and with progression to assist with decreasing pain. MET: Completing HEP 2 times per day. Patient Education:   [x]  HEP/Education Completed:Continue with HEP  ? Mygeni Access Code:  []  No new Education completed  [x]  Reviewed Prior HEP      [x]  Patient verbalized and/or demonstrated understanding of education provided. []  Patient unable to verbalize and/or demonstrate understanding of education provided. Will continue education. [x]  Barriers to learning: none    PLAN:  Treatment Recommendations: Strengthening, Range of Motion, Functional Mobility Training, Gait Training, Stair Training, Pain Management, Home Exercise Program, Patient Education, Positioning, Aquatics and Modalities      Plan to see patient 2 times per week for 12 weeks to address the treatment planned outlined above.   [x]  Continue with current plan of care  []  Modify plan of care as follows:    []  Hold pending physician visit  []  Discharge    Time In 1430   Time Out 1500   Timed Code Minutes: 30 min   Total Treatment Time: 30 min       Electronically Signed by: Taya Barber

## 2021-03-01 NOTE — PROGRESS NOTES
Chief Complaint   Patient presents with    Follow-up     Back pain    Hypertension     still need to take meds?  Obesity       History obtained from the patient. SUBJECTIVE:  Shaun Bill is a 41 y.o.  that presents today for       -Low Back Pain LAST VISIT:    HPI:   Getting worse over time  Has had 2 bad flares and more minor flares in between  Had flare yesterday, missed work  [facility]  Pain in low back  Radiates down R leg  Worse yesterday a bit better today  Taking flexeril and prn tramadol from ER yet, using sparingly  S/p lumbar surgery 2009, had a microdiscectomy, she thinks    She describes the pain as aching, squeezing, throbbing  in the lumbar region. Inciting injury or history of trauma? No  Pain is relieved by - rest, meds  Pain is aggravated by - walking, bending and twisting  Radiation of the pain? Yes  Paresthesias of the extremities? No  Saddle anesthesia? No  Bowel or bladder incontinence? No  Treatments tried - as above    UPDATE TODAY:   Has completed PT, may do more, but is on hold  Still having pain in the low back, but as bad as before, but still present  Having less flares, 2 times per month, instead of twice per wk  Still with pain on and off down R leg. Still with persistent numbness in R toes.     Using zanaflex a few times, does help if needed  mobic helping as well      -HTN:  Pt ran out of BP meds the last 2 wks  BP ok today  Not checking currently  Asking if should still take  No CP/SOB      -Obesity:  Wanted to discuss wts again today  Trouble losing wt  Would like to see dietician  Not able to exercise much d/t back      Age/Gender Health Maintenance    Lipid -   Lab Results   Component Value Date    CHOL 170 10/21/2020    CHOL 200 (H) 11/06/2019     Lab Results   Component Value Date    TRIG 97 10/21/2020    TRIG 122 11/06/2019     Lab Results   Component Value Date    HDL 45 10/21/2020    HDL 48 11/06/2019     Lab Results   Component Value Date [address] 106 10/21/2020    [address] 128 11/06/2019     No results found for: LABVLDL, VLDL  No results found for: CHOLHDLRATIO      DM Screen -   Lab Results   Component Value Date    GLUCOSE 94 11/19/2020     TSH -   Lab Results   Component Value Date    TSH 2.350 10/21/2020       Colon Cancer Screening - age 46  Lung Cancer Screening (Age 54 to [de-identified] with 30 pack year hx, current smoker or quit within past 15 years) - never smoker. Tetanus - UTD 2007  Influenza Vaccine - UTD FALL 2020  Pneumonia Vaccine - age 70  Zoster - age 46     Breast Cancer Screening - ordered OCT 2020  Cervical Cancer Screening - hyst for benign reasons  Osteoporosis Screening - age 59      Current Outpatient Medications   Medication Sig Dispense Refill    albuterol sulfate HFA (VENTOLIN HFA) 108 (90 Base) MCG/ACT inhaler Inhale 2 puffs into the lungs every 4 hours as needed for Wheezing or Shortness of Breath 1 Inhaler 1    tiZANidine (ZANAFLEX) 4 MG tablet Take 1 tablet by mouth every 8 hours as needed (for back pain/spasm) 90 tablet 0    meloxicam (MOBIC) 15 MG tablet Take 1 tablet by mouth daily as needed for Pain 30 tablet 3    losartan (COZAAR) 50 MG tablet Take 1 tablet by mouth daily 90 tablet 3    levothyroxine (SYNTHROID) 25 MCG tablet TAKE 1 TABLET BY MOUTH DAILY 90 tablet 3     No current facility-administered medications for this visit. No orders of the defined types were placed in this encounter. All medications reviewed and reconciled, including OTC and herbal medications. Updated list given to patient.        Patient Active Problem List   Diagnosis    Patellofemoral stress syndrome of right knee    Right knee meniscal tear    Osteochondral defect of patella, right    Morbid obesity ([address]    Multiple thyroid nodules    Adrenal adenoma; bilateral. Stable and benign on CT    Hypothyroidism, postoperative    Hypertension, essential    S/P partial thyroidectomy    Chronic right-sided low back pain with right-sided sciatica    Degenerative disc disease, lumbar       Past Medical History:   Diagnosis Date    Adrenal adenoma; bilateral. Stable and benign on CT     Hypertension, essential 11/11/2019    Hypothyroidism, postoperative     Morbid obesity ([address]     S/P partial thyroidectomy 11/11/2019       Past Surgical History:   Procedure Laterality Date    BACK SURGERY  2009    post aa    CARPAL TUNNEL RELEASE Right     DILATION AND CURETTAGE OF UTERUS      KNEE ARTHROSCOPY Right 11/05/2015    Medial Menesectomy with Chrondroplasty - Dr Jose Dinero N/A 3/9/2018    DIAGNOSTIC LAPAROSCOPY, APPENDECTOMY, infarcted appendix plancha performed by Karen Lombardi MD at [address]  age 19    endometriosis.  THYROID LOBECTOMY Right 10/2019    [facility] [place]    TONSILLECTOMY         Allergies   Allergen Reactions    Tape Worthy Lee Tape] Rash       Social History     Tobacco Use    Smoking status: Never Smoker    Smokeless tobacco: Never Used   Substance Use Topics    Alcohol use: No       Family History   Problem Relation Age of Onset    Colon Cancer Paternal Grandmother 80    Breast Cancer Maternal Grandmother 72    High Blood Pressure Mother     Diabetes Father     Ovarian Cancer Neg Hx     Tuberculosis Neg Hx          I have reviewed the patient's past medical history, past surgical history, allergies, medications, social and family history and I have made updates where appropriate.       Review of Systems  Positive responses are highlighted in bold    Constitutional:  Fever, Chills, Night Sweats, Fatigue, Unexpected changes in weight  HENT:  Ear pain, Tinnitus, Nosebleeds, Trouble swallowing, Hearing loss, Sore throat  Cardiovascular:  Chest Pain, Palpitations, Orthopnea, Paroxysmal Nocturnal Dyspnea  Respiratory:  Cough, Wheezing, Shortness of breath, Chest tightness, Apnea  Gastrointestinal:  Nausea, Vomiting, Diarrhea, Constipation, Heartburn, Blood in stool  Genitourinary:  Difficulty or painful urination, Flank pain, Change in frequency, Urgency  Skin:  Color change, Rash, Itching, Wound  Musculoskeletal:  Joint pain, Back pain, Gait problems, Joint swelling, Myalgias  Neurological:  Dizziness, Headaches, Presyncope, Numbness, Seizures, Tremors  Endocrine:  Heat Intolerance, Cold Intolerance, Polydipsia, Polyphagia, Polyuria      PHYSICAL EXAM:  Vitals:    03/02/21 0901   BP: 132/72   Pulse: 83   Resp: 14   Temp: 98.2 °F (36.8 °C)   TempSrc: Oral   SpO2: 100%   Weight: 295 lb (133.8 kg)   Height: 5' 9\" (1.753 m)     Body mass index is 43.56 kg/m². Pain Score: 3(low back)    VS Reviewed  General Appearance: A&O x 3, No acute distress,well developed and well- nourished  Eyes: pupils equal, round, and reactive to light, extraocular eye movements intact, conjunctivae and eye lids without erythema  ENT: external ear and ear canal clear bilaterally, TMs intact and regular, nose without deformity, nasal mucosa and turbinates normal without polyps, oropharynx normal, dentition is normal for age  Neck: supple and non-tender without mass, no thyromegaly or thyroid nodules, no cervical lymphadenopathy  Pulmonary/Chest: clear to auscultation bilaterally- no wheezes, rales or rhonchi, normal air movement, no respiratory distress or retractions  Cardiovascular: S1 and S2 auscultated w/ RRR. No murmurs, rubs, clicks, or gallops, distal pulses intact. Abdomen: soft, non-tender, non-distended, bowel sounds physiologic,  no rebound or guarding, no masses or hernias noted. Liver and spleen without enlargement. Extremities: no cyanosis, clubbing of the lower extremities. Trace bilat pedal edema. Dec sensation R 2nd toe. Skin: warm and dry, no rash or erythema  LUMBAR SPINE EXAM:  Examination of the Lumbar spine shows:  Deformity Absent . Soft Tissue Swelling Absent . Soft Tissue Tenderness Present. Midline Bone Tenderness Absent .   Paraspinal Muscular Spasm Present. Previous Incisions present. Erythema Absent . Lumbar Flexion does produce pain. Lumbar Extension does produce pain. NEUROLOGICAL EXAM:  SLR     Left: Negative. Right Positive. DTR 2+ bilaterally  Oquendo's Sign Absent   Gait normal Heel/ Toe. Sensation to Touch normal    LE strength    Right Left   Hip Flexors 5/5 5/5   Hip Extensors 5/5 5/5   Knee Flexors 5/5 5/5   Knee Extensors 5/5 5/5   Ankle Flexors 5/5 5/5   Ankle Extensors 5/5 5/5   Extensor Hallicus Longus 5/5 5/5   Dorsiflexors 5/5 5/5     Sensation:  T12 100% 100%   L1 100% 100%   L2 100% 100%   L3 100% 100%   L4 100% 100%   L5 100% 100%   S1 100% 100%   S2 100% 100%   S3-S5 100% 100%       Narrative   PROCEDURE: CT LUMBAR RECONSTRUCTION WO POST PROCESS       CLINICAL INFORMATION: Low back pain .       COMPARISON: No prior study.       TECHNIQUE: 2-D multiplanar reconstructions of the lumbar spine acquired from the CT abdomen pelvis   All CT scans at this facility use dose modulation, iterative reconstruction, and/or weight-based dosing when appropriate to reduce radiation dose to as low as reasonably achievable.       FINDINGS: There is no fracture or acute bony malalignment. There is minimal retrolisthesis of L5 on S1 roughly 4 mm. There is minimal asymmetric bulging disc with no effacement of the left thecal sac. Small marginal osteophyte the inferior aspect of L5    on the right which contributes to minimal and for foraminal narrowing           Impression   Minimal disc protrusion and degenerative change L5-S1               **This report has been created using voice recognition software.  It may contain minor errors which are inherent in voice recognition technology. **       Final report electronically signed by Dr. Checo Padilla on 11/19/2020 9:37 AM       ASSESSMENT & PLAN  1.  Degenerative disc disease, lumbar    Some better, but still fairly symptomatic  con't movbic and prn zanaflex  Done with PT, but sxs persist  Will move on to MRI lumbar spine to r/o discogenic dz. Further w/u and treatment based on results    - MRI LUMBAR SPINE [address]; Future    2. Chronic right-sided low back pain with right-sided sciatica    As per # 1    - MRI LUMBAR SPINE WO CONTRAST; Future    3. Hypertension, essential    Based on based BP's. Would advuse her to con't her current cozaar  Monitor BP's and if dropping low, call and will adjust or stop med    4. Morbid obesity ([address]    Discussed wt loss options and strategies again today  Discussed inc exercise  Discussed diet  Refer to dietician    - [facility] - Dietitian      DISPOSITION    Return in about 8 months (around 11/2/2021) for follow-up on chronic medical conditions, sooner as needed. Chio Sorensen released without work restrictions. Future Appointments   Date Time Provider Junior Kim   11/2/2021  9:00 AM [facility]no [facility] received counseling on the following healthy behaviors: nutrition, exercise and medication adherence    Patient given educational materials on: See Attached    I have instructed Chio Sorensen to complete a self tracking handout on Blood Pressures  and Weights and instructed them to bring it with them to her next appointment. Barriers to learning and self management: none    Discussed use, benefit, and side effects of prescribed medications. Barriers to medication compliance addressed. All patient questions answered. Pt voiced understanding.        Electronically signed by James Moreno DO on 3/2/2021 at 9:22 AM

## 2021-03-02 ENCOUNTER — OFFICE VISIT (OUTPATIENT)
Dept: FAMILY MEDICINE CLINIC | Age: 41
End: 2021-03-02
Payer: COMMERCIAL

## 2021-03-02 VITALS
BODY MASS INDEX: 43.4 KG/M2 | HEART RATE: 83 BPM | TEMPERATURE: 98.2 F | DIASTOLIC BLOOD PRESSURE: 72 MMHG | RESPIRATION RATE: 14 BRPM | WEIGHT: 293 LBS | OXYGEN SATURATION: 100 % | HEIGHT: 69 IN | SYSTOLIC BLOOD PRESSURE: 132 MMHG

## 2021-03-02 DIAGNOSIS — G89.29 CHRONIC RIGHT-SIDED LOW BACK PAIN WITH RIGHT-SIDED SCIATICA: ICD-10-CM

## 2021-03-02 DIAGNOSIS — M51.36 DEGENERATIVE DISC DISEASE, LUMBAR: Primary | ICD-10-CM

## 2021-03-02 DIAGNOSIS — M54.41 CHRONIC RIGHT-SIDED LOW BACK PAIN WITH RIGHT-SIDED SCIATICA: ICD-10-CM

## 2021-03-02 DIAGNOSIS — E66.01 MORBID OBESITY (HCC): Chronic | ICD-10-CM

## 2021-03-02 DIAGNOSIS — I10 HYPERTENSION, ESSENTIAL: Chronic | ICD-10-CM

## 2021-03-02 PROCEDURE — 99214 OFFICE O/P EST MOD 30 MIN: CPT | Performed by: FAMILY MEDICINE

## 2021-03-02 NOTE — PATIENT INSTRUCTIONS
Patient Education        Back Pain: Care Instructions  Your Care Instructions     Back pain has many possible causes. It is often related to problems with muscles and ligaments of the back. It may also be related to problems with the nerves, discs, or bones of the back. Moving, lifting, standing, sitting, or sleeping in an awkward way can strain the back. Sometimes you don't notice the injury until later. Arthritis is another common cause of back pain. Although it may hurt a lot, back pain usually improves on its own within several weeks. Most people recover in 12 weeks or less. Using good home treatment and being careful not to stress your back can help you feel better sooner. Follow-up care is a key part of your treatment and safety. Be sure to make and go to all appointments, and call your doctor if you are having problems. It's also a good idea to know your test results and keep a list of the medicines you take. How can you care for yourself at home? · Sit or lie in positions that are most comfortable and reduce your pain. Try one of these positions when you lie down:  ? Lie on your back with your knees bent and supported by large pillows. ? Lie on the floor with your legs on the seat of a sofa or chair. ? Lie on your side with your knees and hips bent and a pillow between your legs. ? Lie on your stomach if it does not make pain worse. · Do not sit up in bed, and avoid soft couches and twisted positions. Bed rest can help relieve pain at first, but it delays healing. Avoid bed rest after the first day of back pain. · Change positions every 30 minutes. If you must sit for long periods of time, take breaks from sitting. Get up and walk around, or lie in a comfortable position. · Try using a heating pad on a low or medium setting for 15 to 20 minutes every 2 or 3 hours. Try a warm shower in place of one session with the heating pad. Care instructions adapted under license by [employer]. If you have questions about a medical condition or this instruction, always ask your healthcare professional. [place] [address] any warranty or liability for your use of this information.

## 2021-03-09 ENCOUNTER — HOSPITAL ENCOUNTER (OUTPATIENT)
Dept: WOMENS IMAGING | Age: 41
Discharge: HOME | End: 2021-03-09
Payer: COMMERCIAL

## 2021-03-09 DIAGNOSIS — Z12.31 ENCOUNTER FOR SCREENING MAMMOGRAM FOR MALIGNANT NEOPLASM OF BREAST: ICD-10-CM

## 2021-03-09 PROCEDURE — 77063 BREAST TOMOSYNTHESIS BI: CPT

## 2021-03-11 ENCOUNTER — HOSPITAL ENCOUNTER (OUTPATIENT)
Dept: WOMENS IMAGING | Age: 41
Discharge: HOME | End: 2021-03-11
Payer: COMMERCIAL

## 2021-03-11 DIAGNOSIS — N63.21 MASS OF UPPER OUTER QUADRANT OF LEFT BREAST: ICD-10-CM

## 2021-03-11 DIAGNOSIS — R92.2 BREAST DENSITY: ICD-10-CM

## 2021-03-11 PROCEDURE — 76642 ULTRASOUND BREAST LIMITED: CPT

## 2021-03-11 PROCEDURE — G0279 TOMOSYNTHESIS, MAMMO: HCPCS

## 2021-03-17 ENCOUNTER — OFFICE VISIT (OUTPATIENT)
Dept: INTERNAL MEDICINE CLINIC | Age: 41
End: 2021-03-17
Payer: COMMERCIAL

## 2021-03-17 VITALS — TEMPERATURE: 97.3 F | BODY MASS INDEX: 43.4 KG/M2 | HEIGHT: 69 IN | WEIGHT: 293 LBS

## 2021-03-17 DIAGNOSIS — E88.81 DYSMETABOLIC SYNDROME X: ICD-10-CM

## 2021-03-17 DIAGNOSIS — E88.81 DYSMETABOLIC SYNDROME: ICD-10-CM

## 2021-03-17 DIAGNOSIS — E66.01 MORBID OBESITY (HCC): Chronic | ICD-10-CM

## 2021-03-17 PROCEDURE — 97802 MEDICAL NUTRITION INDIV IN: CPT | Performed by: DIETITIAN, REGISTERED

## 2021-03-17 NOTE — PROGRESS NOTES
[address] [place] [address]. [address] [place] [address] [place] [facility], [address]  [phone] (phone)  [phone] (fax)    Patient Name: Usha Martines. Date of Birth: [id number]. MRN: [id number]      Assessment: Patient is a 41 y.o. female seen for Initial MNT visit for Dysmetabolic Syndrome X, Obesity, Overweight    -Nutritionally relevant labs:   Lab Results   Component Value Date/Time    LABA1C 5.6 10/21/2020 10:47 AM    GLUCOSE 94 11/19/2020 08:07 AM    GLUCOSE 87 10/21/2020 10:47 AM    CHOL 170 10/21/2020 10:47 AM    HDL 45 10/21/2020 10:47 AM    LDLCALC 106 10/21/2020 10:47 AM    TRIG 97 10/21/2020 10:47 AM     Use to weigh over 300#. Pt c/o cannot get over her current weight plateau of 888 - 488#    Works at [employer] - day hours 530 am - 2 pm.  Works every weekend. Days off Tues and Wed. . Working a lot of overtime. 's days off - wed. & Sunday. Household also includes 16 yr son in [place] [address].    -Food recall:   Breakfast: Use to skip but now takes with her to work since [employer] Jan. A Healthy smoothie - superfood smoothie recipe with  1 cup spinach, 1 cup mixed berries, 1 cup unsw. almond milk, 1 TB lolly seed and 1 TB organic almond butter and 1 TB flaxseed and 1/2 banana. Lunch: Use to skip a lot but now packs since Mid Jan - gets a salad kit from [employer] - cabbage, kale, salad dressing adds tuna pk. Dinner: Family meal - pizza last night 2 slices - all meat (3 meat toppings) OR chicken tacos will eat 3 - slow cooker recipe with chicken, black beans, corn salsa, taco seasoning, side of rice refried beans, tortillas. Snacks: not at work, at home - after work - pbutter spoonful. Can eat a lot of nuts at times. Likes Ice cream.    Eat out/take out -  A lot. Use to do 1/2 hr work outs everyday. Having back issues currently. -Main Beverages: black coffee. Water. No juice. Pop - 2 bottles/week. Dr Abbi De Guzman or Jyoti.     -Impression of Dietary Intake: on average, 3 meals per day, on average, 2 servings fruit per day, on average, 2-4 servings vegetables per day, high fiber, fast food or pizza often. Current Outpatient Medications on File Prior to Visit   Medication Sig Dispense Refill    albuterol sulfate HFA (VENTOLIN HFA) 108 (90 Base) MCG/ACT inhaler Inhale 2 puffs into the lungs every 4 hours as needed for Wheezing or Shortness of Breath 1 Inhaler 1    tiZANidine (ZANAFLEX) 4 MG tablet Take 1 tablet by mouth every 8 hours as needed (for back pain/spasm) 90 tablet 0    meloxicam (MOBIC) 15 MG tablet Take 1 tablet by mouth daily as needed for Pain 30 tablet 3    losartan (COZAAR) 50 MG tablet Take 1 tablet by mouth daily 90 tablet 3    levothyroxine (SYNTHROID) 25 MCG tablet TAKE 1 TABLET BY MOUTH DAILY 90 tablet 3     No current facility-administered medications on file prior to visit. Vitals from current and previous visits:  Temp 97.3 °F (36.3 °C)   Ht 5' 9\" (1.753 m)   Wt 299 lb (135.6 kg)   BMI 44.15 kg/m²     -Body mass index is 44.15 kg/m². Greater than 40 - Morbid Obesity / Extreme Obesity / Grade III. -Weight goal: lose weight. Nutrition Diagnosis:   Obesity related to Lack of previous MNT/currently undergoing MNT as evidenced by Body mass index is 44.15 kg/m². .         Intervention:  -Impression: Pt engaged in session and motivated to following the guidelines given today.    -Instructed the patient on: Carbohydrate Counting, Consistent Carbohydrate Intake, Food Label Reading, Meal Planning for Regular, Balanced Meals & Snacks, Plate Method and low fat guidelines. -Handouts given for: Weight mgmt guide booklet, food logging, healthy snacks, 1500 jj 5 day sample meal plans/menus, weight management tips,calorie density. And extra handouts from room - power up with brkf, power lunches 400 jj, snack ideas, 5 Tips to kick bad habits to the curb, healthy cooking, start simple. Stevia beverage sample.     Patient

## 2021-03-17 NOTE — PATIENT INSTRUCTIONS
1. )  Get familiar with reading the nutrition facts label by looking at serving size and total carbohydrates and total fat. - Without a label refer to your weight mgmt guide booklet. 2.)  Measure foods for accuracy in carb counting.  - Your fat budget for the day <55 gms. 3.)  Healthy carb choices:  whole grains, whole wheat pasta, starchy vegetables, fresh fruit and lowfat/non-fat milk and yogurt. - Avoid regular pop - try other sugar free drink alternatives  - Caffeine free beverages and water count toward your 64 - 80 ounces fluid/day    4.)  Your meal plan is found on the inside cover of your carb counting guide booklet:  1st maryjo or Brkf:  30-45 gms carbohydrates + 1-2 oz protein  2nd meal or Lunch: 45 gms carbohydrates + 2-3 oz protein + non-starchy veggies  3rd meal or Dinner:  60 gms carbohydrates + 2-3 oz protein + non- starchy veggies    Snack time:  15 - 20 gms carbohydrates + 1 oz protein    5.)  Choose lean protein most of the time and Cut in 1/2 added fats to help with weight loss efforts. 6.) Can use www. [email or web address] for food logging  And bring in your username and password so I can print out your food logging OR Bring a 1-2 week written food log to next dietitian sebas. Thanks.

## 2021-04-07 NOTE — DISCHARGE SUMMARY
[facility]    Patient Name: Walter Kelly        CSN: [id number]   YOB: 1980  Gender: female  Stephen [place], ,    Other intervertebral disc degeneration, lumbar region [M51.36]  Other chronic pain [G89.29]  Lumbago with sciatica, right side [M54.41] ,      Patient is discharged from Physical Therapy services at this time. See last note for details related to results of therapy and goal achievement. Reason for discharge: Karo Wong was last seen in therapy on 3/1/21. Progress note was completed and she was put on hold and was returning to doctor. Patient was to contact therapist after doctor's appointment if additional therapy was needed. Patient did not contact therapist and is being discharged today. Please see previous notes for further details. Jose Rajan  [id number], [facility]

## 2021-04-08 DIAGNOSIS — E89.0 POSTOPERATIVE HYPOTHYROIDISM: ICD-10-CM

## 2021-04-08 RX ORDER — LEVOTHYROXINE SODIUM 0.03 MG/1
25 TABLET ORAL DAILY
Qty: 90 TABLET | Refills: 3 | Status: SHIPPED | OUTPATIENT
Start: 2021-04-08 | End: 2022-03-01 | Stop reason: SDUPTHER

## 2021-04-28 ENCOUNTER — OFFICE VISIT (OUTPATIENT)
Dept: INTERNAL MEDICINE CLINIC | Age: 41
End: 2021-04-28
Payer: COMMERCIAL

## 2021-04-28 VITALS — HEIGHT: 69 IN | BODY MASS INDEX: 43.4 KG/M2 | TEMPERATURE: 97.3 F | WEIGHT: 293 LBS

## 2021-04-28 DIAGNOSIS — E66.01 MORBID OBESITY (HCC): Chronic | ICD-10-CM

## 2021-04-28 DIAGNOSIS — E88.81 DYSMETABOLIC SYNDROME X: ICD-10-CM

## 2021-04-28 PROCEDURE — 97803 MED NUTRITION INDIV SUBSEQ: CPT | Performed by: DIETITIAN, REGISTERED

## 2021-04-28 NOTE — PROGRESS NOTES
[address] [place] [address]. [address] [place] [address] [place]., [facility], [address]  [phone] (phone)  [phone] (fax)    Patient Name: Cristian Davies. Date of Birth: [id number]. MRN: [id number]      Assessment: Patient is a 35 y.o. female seen for follow-up MNT visit for Dysmetabolic Syndrome X, Obesity, Overweight  .     -Nutritionally relevant labs:   Lab Results   Component Value Date/Time    LABA1C 5.6 10/21/2020 10:47 AM    GLUCOSE 94 11/19/2020 08:07 AM    GLUCOSE 87 10/21/2020 10:47 AM    CHOL 170 10/21/2020 10:47 AM    HDL 45 10/21/2020 10:47 AM    LDLCALC 106 10/21/2020 10:47 AM    TRIG 97 10/21/2020 10:47 AM     Notes from prior nut. Couns. Session - 3/17/21  Smoothie recipe since Mid Jan.  - superfood smoothie recipe with  1 cup spinach, 1 cup mixed berries, 1 cup unsw. almond milk, 1 TB lolly seed and 1 TB organic almond butter and 1 TB flaxseed and 1/2 banana. Works at [employer] - day hours 530 am - 2 pm.  Works every weekend. Days off Tues and Wed. . Working a lot of overtime. 's days off - wed. & Sunday. Household also includes 16 yr son in HS. Today's Nutr. Couns. Visit.  -Food recall: My Fitness pal  Breakfast: Black coffee and Blueberry Brkf smoothie. Lunch: Packs lunch for workdays. Usually on other non work days busy with  and he is a realtor so helps with him. And these are the days of doing a lot of other running. So will eat out often on days off. [employer] 2 piece chicken tenders, mashed potatoes, green beans OR Olive Garden Zuppa Toscana bowl OR 1/2 Chicken Marsala meal OR Chad Dowd's Chili OR White Bean soup OR Devin Schultze - double cheeseburger, med fries OR Grilled chicken, greens, mashed pot, Corn.    Dinner: Blueberry pancakes (2), sc eggs (2), 2 sl zelaya, 1/2 banana OR Arby's Jalapeno bites, chicken Zelaya swiss OR 2 BBQ ribs, 2 oz smoked ham, greens, mashed pot, corn OR 1 sl Lynnwood's pizza with pepp and sausage OR Made cheeseburger at home and had steak fries with this. .   Snacks: almonds OR 1/2 cup frozen yogurt  Was sick with the flu ~ 1 week this past month. -Main Beverages: coffee in the morning and water. Walking 1 hour everyday. Normal routine. Has set up a memberships at [employer] and has set up a workout routine there. Plans to do 3x/week. Her son is also going and motivates her to go. She will do 1/2 hour cardio - treadmill/stationary bike and machine workouts. -Impression of Dietary Intake: on average, 3 meals per day, on average, 5 fast food meals per week, high fat/ cholesterol, lacking routine intake of fresh fruits and vegetables, nonfat dairy Low fiber. Current Outpatient Medications on File Prior to Visit   Medication Sig Dispense Refill    levothyroxine (SYNTHROID) 25 MCG tablet Take 1 tablet by mouth daily 90 tablet 3    albuterol sulfate HFA (VENTOLIN HFA) 108 (90 Base) MCG/ACT inhaler Inhale 2 puffs into the lungs every 4 hours as needed for Wheezing or Shortness of Breath 1 Inhaler 1    tiZANidine (ZANAFLEX) 4 MG tablet Take 1 tablet by mouth every 8 hours as needed (for back pain/spasm) 90 tablet 0    meloxicam (MOBIC) 15 MG tablet Take 1 tablet by mouth daily as needed for Pain 30 tablet 3    losartan (COZAAR) 50 MG tablet Take 1 tablet by mouth daily (Patient not taking: Reported on 4/28/2021) 90 tablet 3     No current facility-administered medications on file prior to visit. Vitals from current and previous visits:  Temp 97.3 °F (36.3 °C)   Ht 5' 9\" (1.753 m)   Wt 299 lb (135.6 kg)   BMI 44.15 kg/m²     -Body mass index is 44.15 kg/m². Greater than 40 - Morbid Obesity / Extreme Obesity / Grade III. - Patient maintained.  -Weight goal: lose weight. Nutrition Diagnosis:   Food and nutrition-related knowledge deficit related to currently undergoing MNT as evidenced by Food recall.          Intervention:  -Impression: Pt has a plan in place for exercising. Pt has trouble with meal preparation and relies heavily on convenient fast foods.    -Instructed the patient on: Meal Planning for Regular, Balanced Meals & Snacks, The Importance of Regular Physical Activity and see instructions below.    -Handouts given for: fast food - guidelines and healthier options listed, eating on the run, steps to eating out, ME hunger scale. Patient Instructions   1.)  Your meal plan:  1600 calories/day and <55 gms fat/day    2.) Go to your favorite restaurant websites and figure out lower fat options and choices. - may need to add these meals with fruit and veggies/salad &/or yogurt. 3.)  Great job developing a workout schedule!!  3x/week. 4.)  Great job doing the [employer] for Omnicolifecake.    5.) Slow down eating - follow the mindful eating hunger scale.    -Nutrition prescription: 1600 calories/day, 180 g carbs/day. <55 gms fat/day. Comprehension verified using teachback method. Monitoring/Evaluation:   -Followup visit: 8 weeks with dietitian.   -Receptiveness to education/goals: Agreeable.  -Evaluation of education: Indicates understanding.  -Readiness to change: contemplation - ambivalent about change adding more fresh fruit and veggies with meals and choosing lower fat options when eating out and action - ready to set action plan and implement working out 3x/week. -Expected compliance: good. Thank you for your referral of this patient. Total time involved in direct patient education: 45 minutes for follow-up MNT visit.

## 2021-07-20 DIAGNOSIS — G89.29 CHRONIC RIGHT-SIDED LOW BACK PAIN WITH RIGHT-SIDED SCIATICA: ICD-10-CM

## 2021-07-20 DIAGNOSIS — M54.41 CHRONIC RIGHT-SIDED LOW BACK PAIN WITH RIGHT-SIDED SCIATICA: ICD-10-CM

## 2021-07-20 DIAGNOSIS — M51.36 DEGENERATIVE DISC DISEASE, LUMBAR: ICD-10-CM

## 2021-07-21 RX ORDER — MELOXICAM 15 MG/1
15 TABLET ORAL DAILY PRN
Qty: 30 TABLET | Refills: 3 | Status: SHIPPED | OUTPATIENT
Start: 2021-07-21 | End: 2021-08-19 | Stop reason: SDUPTHER

## 2021-08-17 ENCOUNTER — HOSPITAL ENCOUNTER (OUTPATIENT)
Dept: MRI IMAGING | Age: 41
Discharge: HOME | End: 2021-08-17
Payer: COMMERCIAL

## 2021-08-17 DIAGNOSIS — M51.36 DEGENERATIVE DISC DISEASE, LUMBAR: ICD-10-CM

## 2021-08-17 DIAGNOSIS — M54.41 CHRONIC RIGHT-SIDED LOW BACK PAIN WITH RIGHT-SIDED SCIATICA: ICD-10-CM

## 2021-08-17 DIAGNOSIS — G89.29 CHRONIC RIGHT-SIDED LOW BACK PAIN WITH RIGHT-SIDED SCIATICA: ICD-10-CM

## 2021-08-17 PROCEDURE — 72148 MRI LUMBAR SPINE W/O DYE: CPT

## 2021-08-18 ENCOUNTER — TELEPHONE (OUTPATIENT)
Dept: FAMILY MEDICINE CLINIC | Age: 41
End: 2021-08-18

## 2021-08-18 DIAGNOSIS — M54.41 CHRONIC RIGHT-SIDED LOW BACK PAIN WITH RIGHT-SIDED SCIATICA: ICD-10-CM

## 2021-08-18 DIAGNOSIS — G89.29 CHRONIC RIGHT-SIDED LOW BACK PAIN WITH RIGHT-SIDED SCIATICA: ICD-10-CM

## 2021-08-18 DIAGNOSIS — M51.36 DEGENERATIVE DISC DISEASE, LUMBAR: Primary | ICD-10-CM

## 2021-08-18 NOTE — TELEPHONE ENCOUNTER
Diagnosis Orders   1. Degenerative disc disease, lumbar  [facility], Gavin, DO, Pain Medicine, [facility] II.[place]ERT[facility]   2.  Chronic right-sided low back pain with right-sided sciatica  [facility], Katy Bedoya 1945, DO, Pain Medicine, [facility] II.[place]

## 2021-08-18 NOTE — TELEPHONE ENCOUNTER
Patient has been informed and voiced understanding and states that she is ok with being referred to PM

## 2021-08-18 NOTE — TELEPHONE ENCOUNTER
----- Message from Bushra Elliott DO sent at 8/17/2021  6:59 PM EDT -----  Please let pt know that MRI shows arthritic changes as well as narrowing in the spine where the nerve roots come out. Doesn't appear surgical at this point, but would benefit from seeing pain management to discuss injections or nerve ablation. If ok can place a referral. Let me know. Thanks! Let me know if questions, thanks!

## 2021-08-18 NOTE — PROGRESS NOTES
Chief Complaint   Patient presents with    Lower Back Pain     rt lower back that shoots down rt leg to foot . sometimes toes are discolored     Other     has not taken B/p medication for 3 months  - nauseated      Other     discuss weight loss program        History obtained from the patient. SUBJECTIVE:  Anne Murdock is a 35 y.o.  that presents today for     -Low Back Pain PRIOR VISIT:    HPI:   Getting worse over time  Has had 2 bad flares and more minor flares in between  Had flare yesterday, missed work  Seen in ER in [address]  Pain in low back  Radiates down R leg  Worse yesterday a bit better today  Taking flexeril and prn tramadol from ER yet, using sparingly  S/p lumbar surgery 2009, had a microdiscectomy, she thinks    She describes the pain as aching, squeezing, throbbing  in the lumbar region. Inciting injury or history of trauma? No  Pain is relieved by - rest, meds  Pain is aggravated by - walking, bending and twisting  Radiation of the pain? Yes  Paresthesias of the extremities? No  Saddle anesthesia? No  Bowel or bladder incontinence? No  Treatments tried - as above    UPDATE TODAY:   Has completed PT, may do more, but is on hold  Still having pain in the low back, but as bad as before, but still present  Having less flares, 2 times per month, instead of twice per wk  Still with pain on and off down R leg. Still with persistent numbness in R toes.     Using zanaflex a few times, does help if needed  mobic helping as well    UPDATE TODAY:   Pt was doing better  But worse again  Low back  Radiation down R leg  Foot numb on and off on R  mobic and tizanidine do help  Had recent MRI  Has referral for pain management, but would like to d/w spine surgeon       -HTN:    HPI:  Pt stopped her cozaar d/t nausea  BP's running higher w/o it  No CP/SOB    Taking meds as prescribed ?: no  Tolerating well ?: no  Side Effects ?: yes  BP at home ?: 140-150  Working on TLCS ?: yes  Chest Pain/SOB/Palpitations? no    BP Readings from Last 3 Encounters:   08/19/21 (!) 155/94   03/02/21 132/72   01/05/21 126/82       -Obesity:  Wanted to discuss wts again today  Trouble losing wt  Has seen dietician, and doing great with diet  Is active and exercises regularly      Age/Gender Health Maintenance    Lipid -   Lab Results   Component Value Date    CHOL 170 10/21/2020    CHOL 200 (H) 11/06/2019     Lab Results   Component Value Date    TRIG 97 10/21/2020    TRIG 122 11/06/2019     Lab Results   Component Value Date    HDL 45 10/21/2020    HDL 48 11/06/2019     Lab Results   Component Value Date    LDLCALC 106 10/21/2020    [address] 11/06/2019     No results found for: LABVLDL, VLDL  No results found for: CHOLHDLRATIO      DM Screen -   Lab Results   Component Value Date    GLUCOSE 94 11/19/2020     TSH -   Lab Results   Component Value Date    TSH 2.350 10/21/2020       Colon Cancer Screening - age 46  Lung Cancer Screening (Age 54 to [de-identified] with 30 pack year hx, current smoker or quit within past 15 years) - never smoker. Tetanus - UTD MAY 2018  Influenza Vaccine - UTD FALL 2020  Pneumonia Vaccine - age 70  Zoster - age 46     Breast Cancer Screening - ABN MARCH 2021 with neg f/u imaging. Repeat 1 year. Cervical Cancer Screening - hyst for benign reasons  Osteoporosis Screening - age 59      Current Outpatient Medications   Medication Sig Dispense Refill    telmisartan (MICARDIS) 40 MG tablet Take 1 tablet by mouth daily 90 tablet 3    meloxicam (MOBIC) 15 MG tablet Take 1 tablet by mouth daily as needed for Pain 30 tablet 5    tiZANidine (ZANAFLEX) 4 MG tablet Take 1 tablet by mouth every 8 hours as needed (for back pain/spasm) 90 tablet 0    predniSONE (DELTASONE) 20 MG tablet Take 2 tablets by mouth daily for 5 days 10 tablet 0    levothyroxine (SYNTHROID) 25 MCG tablet Take 1 tablet by mouth daily 90 tablet 3     No current facility-administered medications for this visit.      Orders Placed This Encounter   Medications    telmisartan (MICARDIS) 40 MG tablet     Sig: Take 1 tablet by mouth daily     Dispense:  90 tablet     Refill:  3    meloxicam (MOBIC) 15 MG tablet     Sig: Take 1 tablet by mouth daily as needed for Pain     Dispense:  30 tablet     Refill:  5    tiZANidine (ZANAFLEX) 4 MG tablet     Sig: Take 1 tablet by mouth every 8 hours as needed (for back pain/spasm)     Dispense:  90 tablet     Refill:  0    predniSONE (DELTASONE) 20 MG tablet     Sig: Take 2 tablets by mouth daily for 5 days     Dispense:  10 tablet     Refill:  0         All medications reviewed and reconciled, including OTC and herbal medications. Updated list given to patient. Patient Active Problem List   Diagnosis    Patellofemoral stress syndrome of right knee    Right knee meniscal tear    Osteochondral defect of patella, right    Morbid obesity ([address]    Multiple thyroid nodules    Adrenal adenoma; bilateral. Stable and benign on CT    Hypothyroidism, postoperative    Hypertension, essential    S/P partial thyroidectomy    Chronic right-sided low back pain with right-sided sciatica    Degenerative disc disease, lumbar       Past Medical History:   Diagnosis Date    Adrenal adenoma; bilateral. Stable and benign on CT     Hypertension, essential 11/11/2019    Hypothyroidism, postoperative     Morbid obesity ([address]     S/P partial thyroidectomy 11/11/2019       Past Surgical History:   Procedure Laterality Date    BACK SURGERY  2009    post aa    CARPAL TUNNEL RELEASE Right     DILATION AND CURETTAGE OF UTERUS      HYSTERECTOMY      KNEE ARTHROSCOPY Right 11/05/2015    Medial Menesectomy with Chrondroplasty - Dr Rangel San N/A 3/9/2018    DIAGNOSTIC LAPAROSCOPY, APPENDECTOMY, infarcted appendix plancha performed by Marty Kilgore MD at [address] [place] [address]  age 19    endometriosis.      THYROID LOBECTOMY Right 10/2019    [facility] [place]    TONSILLECTOMY         No Known Allergies    Social History     Tobacco Use    Smoking status: Never Smoker    Smokeless tobacco: Never Used   Substance Use Topics    Alcohol use: No       Family History   Problem Relation Age of Onset    Colon Cancer Paternal Grandmother 80    Breast Cancer Paternal Grandmother 52    Breast Cancer Maternal Grandmother 39    High Blood Pressure Mother     Diabetes Father     Lung Cancer Maternal Uncle 72    Breast Cancer Paternal Aunt 40    Cancer Paternal Uncle         pt not sure of orgin of cancer    Cancer Maternal Uncle         pt unsure of orgin of cancer    Ovarian Cancer Neg Hx     Tuberculosis Neg Hx          I have reviewed the patient's past medical history, past surgical history, allergies, medications, social and family history and I have made updates where appropriate. Review of Systems  Positive responses are highlighted in bold    Constitutional:  Fever, Chills, Night Sweats, Fatigue, Unexpected changes in weight  HENT:  Ear pain, Tinnitus, Nosebleeds, Trouble swallowing, Hearing loss, Sore throat  Cardiovascular:  Chest Pain, Palpitations, Orthopnea, Paroxysmal Nocturnal Dyspnea  Respiratory:  Cough, Wheezing, Shortness of breath, Chest tightness, Apnea  Gastrointestinal:  Nausea, Vomiting, Diarrhea, Constipation, Heartburn, Blood in stool  Genitourinary:  Difficulty or painful urination, Flank pain, Change in frequency, Urgency  Skin:  Color change, Rash, Itching, Wound  Musculoskeletal:  Joint pain, Back pain, Gait problems, Joint swelling, Myalgias  Neurological:  Dizziness, Headaches, Presyncope, Numbness, Seizures, Tremors  Endocrine:  Heat Intolerance, Cold Intolerance, Polydipsia, Polyphagia, Polyuria      PHYSICAL EXAM:  Vitals:    08/19/21 1429 08/19/21 1549   BP: (!) 185/112 (!) 155/94   Pulse: 77    Resp: 12    Temp: 98.4 °F (36.9 °C)    TempSrc: Oral    SpO2: 98%    Weight: 299 lb (135.6 kg)    Height: 5' 8.5\" (1.74 m)      Body mass index is 44.8 kg/m².   Pain Score:   5 (low back pain)    VS Reviewed  General Appearance: A&O x 3, No acute distress,well developed and well- nourished  Eyes: pupils equal, round, and reactive to light, extraocular eye movements intact, conjunctivae and eye lids without erythema  ENT: external ear and ear canal clear bilaterally, TMs intact and regular, nose without deformity, nasal mucosa and turbinates normal without polyps, oropharynx normal, dentition is normal for age  Neck: supple and non-tender without mass, no thyromegaly or thyroid nodules, no cervical lymphadenopathy  Pulmonary/Chest: clear to auscultation bilaterally- no wheezes, rales or rhonchi, normal air movement, no respiratory distress or retractions  Cardiovascular: S1 and S2 auscultated w/ RRR. No murmurs, rubs, clicks, or gallops, distal pulses intact. Abdomen: soft, non-tender, non-distended, bowel sounds physiologic,  no rebound or guarding, no masses or hernias noted. Liver and spleen without enlargement. Extremities: no cyanosis, clubbing of the lower extremities. Trace bilat pedal edema. Dec sensation R 2nd toe. Skin: warm and dry, no rash or erythema  LUMBAR SPINE EXAM:  Examination of the Lumbar spine shows:  Deformity Absent . Soft Tissue Swelling Absent . Soft Tissue Tenderness Present. Midline Bone Tenderness Absent . Paraspinal Muscular Spasm Present. Previous Incisions Absent . Erythema Absent . Lumbar Flexion does not produce pain. Lumbar Extension does not produce pain. NEUROLOGICAL EXAM:  SLR     Left: Negative. Right Positive. DTR 2+ bilaterally  Oquendo's Sign Absent   Gait normal Heel/ Toe.   Sensation to Touch normal    LE strength    Right Left   Hip Flexors 5/5 5/5   Hip Extensors 5/5 5/5   Knee Flexors 5/5 5/5   Knee Extensors 5/5 5/5   Ankle Flexors 5/5 5/5   Ankle Extensors 5/5 5/5   Extensor Hallicus Longus 5/5 5/5   Dorsiflexors 5/5 5/5     Sensation:  T12 100% 100%   L1 100% 100%   L2 100% 100%   L3 100% 100%   L4 100% 100% L5 100% 100%   S1 100% 100%   S2 100% 100%   S3-S5 100% 100%       VASCULAR EXAM:  Examination of the upper and lower extremities shows intact perfusion to all extremities, no cyanosis, digits are warm to touch, capillary refill is less than 2 seconds. No significant edema noted. SKIN:  Examination of the skin reveals the skin to be intact without lacerations, abrasions, significant erythema, rashes or skin lesions. Narrative   PROCEDURE: MRI LUMBAR SPINE WO CONTRAST       CLINICAL INFORMATION: Degenerative disc disease, lumbar, Chronic right-sided low back pain with right-sided sciatica, Chronic right-sided low back pain with right-sided sciatica.       COMPARISON: CT lumbar spine dated 11/19/2020.       TECHNIQUE: Sagittal and axial T1 and T2-weighted images were obtained through the lumbar spine.       FINDINGS:   There is minimal, grade 1, retrolisthesis of L2 relative to L3, L3 relative to L4 and L4 relative to L5 on the basis of degenerative change, stable compared to prior CT. There is otherwise anatomic vertebral body height and alignment. There are a few    scattered focal areas of hyperintense T1 and T2 signal in the lumbar vertebral column is evidence for hemangiomas. Marrow signal is diffusely mildly heterogeneous. The conus terminates in a normal fashion at the L1 level. The cauda equina is normal in    appearance without nerve root thickening or clumping identified. Paraspinal soft tissues are unremarkable. At T12-L1 there is no significant spinal canal or neuroforaminal stenosis. At L1-2 there is a shallow disc bulge without significant spinal canal stenosis. There is mild bilateral neural foraminal stenosis in association with facet hypertrophy and ligament flavum thickening. At L2-3 there is minimal partial uncovering the disc with superimposed shallow disc bulge which mildly indents thecal sac but does not contact traversing nerve roots.  There is mild right and mild-to-moderate left neural foraminal stenosis in association    with facet hypertrophy and ligamentum flavum thickening. At L3-4 there is minimal partial uncovering the disc with superimposed shallow disc bulge which minimally indents thecal sac but does not contact traversing nerve roots. There is mild to moderate right and mild left neural foraminal stenosis in    association with facet hypertrophy and ligamentum flavum thickening. At L4-5 there is a shallow disc bulge which mildly indents thecal sac but does not contact traversing nerve roots. There is mild to moderate bilateral neural foraminal stenosis in association with facet hypertrophy and ligamentum flavum thickening. At L5-S1 there is a shallow broad-based protrusion without significant spinal canal stenosis and mild to moderate bilateral neural foraminal stenosis in association with facet hypertrophy and ligamentum flavum thickening.           Impression    Overall mild multilevel degenerative changes without significant spinal canal stenosis and up to mild to moderate neural foraminal stenosis at the lower lumbar levels.                   **This report has been created using voice recognition software. It may contain minor errors which are inherent in voice recognition technology. **       Final report electronically signed by Dr. José Luis Rudolph MD on 8/17/2021 3:13 PM       ASSESSMENT & PLAN  1. Degenerative disc disease, lumbar    con't mobic and tizanidine  5 days pred  Con;t HEP  Has consult for pain management but wants to see surgeon as well to get options    - meloxicam (MOBIC) 15 MG tablet; Take 1 tablet by mouth daily as needed for Pain  Dispense: 30 tablet; Refill: 5  - tiZANidine (ZANAFLEX) 4 MG tablet; Take 1 tablet by mouth every 8 hours as needed (for back pain/spasm)  Dispense: 90 tablet; Refill: 0  - predniSONE (DELTASONE) 20 MG tablet; Take 2 tablets by mouth daily for 5 days  Dispense: 10 tablet;  Refill: 0  - External Referral To Orthopedic Surgery    2. Chronic right-sided low back pain with right-sided sciatica    - meloxicam (MOBIC) 15 MG tablet; Take 1 tablet by mouth daily as needed for Pain  Dispense: 30 tablet; Refill: 5  - tiZANidine (ZANAFLEX) 4 MG tablet; Take 1 tablet by mouth every 8 hours as needed (for back pain/spasm)  Dispense: 90 tablet; Refill: 0  - predniSONE (DELTASONE) 20 MG tablet; Take 2 tablets by mouth daily for 5 days  Dispense: 10 tablet; Refill: 0  - External Referral To Orthopedic Surgery    3. Hypertension, essential    D/c cozaar d/t side effects  Add Micardis instead  bp log  Labs ordered  F/u 4 wks    - telmisartan (MICARDIS) 40 MG tablet; Take 1 tablet by mouth daily  Dispense: 90 tablet; Refill: 3  - CBC Auto Differential; Future  - Comprehensive Metabolic Panel; Future  - Lipid Panel; Future  - Microalbumin / Creatinine Urine Ratio; Future  - TSH with Reflex; Future    4. Morbid obesity with BMI of 40.0-44.9, adult ([facility] [address]    Discussed wt loss strategies  She's doing all she can  Will f/u 4 wks  If BP's better, will look to trial adipex  Pt also considering bariatrics center. DISPOSITION    Return in about 4 weeks (around 9/16/2021) for f/u multiple issues, sooner as needed. Carolina Tidwell released without work restrictions. Future Appointments   Date Time Provider Junior Kim   9/20/2021  7:40 AM [facility]   11/2/2021  9:00 AM Torie Torres DO [facility]     PATIENT COUNSELING    Barriers to learning and self management: none    Discussed use, benefit, and side effects of prescribed medications. Barriers to medication compliance addressed. All patient questions answered. Pt voiced understanding.        Electronically signed by Torie Torres DO on 8/19/2021 at 3:49 PM

## 2021-08-19 ENCOUNTER — OFFICE VISIT (OUTPATIENT)
Dept: FAMILY MEDICINE CLINIC | Age: 41
End: 2021-08-19
Payer: COMMERCIAL

## 2021-08-19 VITALS
BODY MASS INDEX: 43.4 KG/M2 | HEART RATE: 77 BPM | RESPIRATION RATE: 12 BRPM | WEIGHT: 293 LBS | SYSTOLIC BLOOD PRESSURE: 155 MMHG | HEIGHT: 69 IN | OXYGEN SATURATION: 98 % | DIASTOLIC BLOOD PRESSURE: 94 MMHG | TEMPERATURE: 98.4 F

## 2021-08-19 DIAGNOSIS — M51.36 DEGENERATIVE DISC DISEASE, LUMBAR: Primary | ICD-10-CM

## 2021-08-19 DIAGNOSIS — G89.29 CHRONIC RIGHT-SIDED LOW BACK PAIN WITH RIGHT-SIDED SCIATICA: ICD-10-CM

## 2021-08-19 DIAGNOSIS — E66.01 MORBID OBESITY WITH BMI OF 40.0-44.9, ADULT (HCC): ICD-10-CM

## 2021-08-19 DIAGNOSIS — M54.41 CHRONIC RIGHT-SIDED LOW BACK PAIN WITH RIGHT-SIDED SCIATICA: ICD-10-CM

## 2021-08-19 DIAGNOSIS — I10 HYPERTENSION, ESSENTIAL: ICD-10-CM

## 2021-08-19 PROCEDURE — 99214 OFFICE O/P EST MOD 30 MIN: CPT | Performed by: FAMILY MEDICINE

## 2021-08-19 RX ORDER — PREDNISONE 20 MG/1
40 TABLET ORAL DAILY
Qty: 10 TABLET | Refills: 0 | Status: SHIPPED | OUTPATIENT
Start: 2021-08-19 | End: 2021-08-24

## 2021-08-19 RX ORDER — MELOXICAM 15 MG/1
15 TABLET ORAL DAILY PRN
Qty: 30 TABLET | Refills: 5 | Status: SHIPPED | OUTPATIENT
Start: 2021-08-19 | End: 2022-03-01 | Stop reason: SDUPTHER

## 2021-08-19 RX ORDER — TELMISARTAN 40 MG/1
40 TABLET ORAL DAILY
Qty: 90 TABLET | Refills: 3 | Status: SHIPPED | OUTPATIENT
Start: 2021-08-19

## 2021-08-19 RX ORDER — TIZANIDINE 4 MG/1
4 TABLET ORAL EVERY 8 HOURS PRN
Qty: 90 TABLET | Refills: 0 | Status: SHIPPED | OUTPATIENT
Start: 2021-08-19

## 2021-08-19 NOTE — LETTER
[address] [facility]  [address] [place] [address]. [place] [address]  Phone: [phone]  Fax: [address] [facility], DO      August 19, 2021    Patient: Kartik Valiente   YOB: 1980   Date of Visit: 8/19/2021       To Whom it May Concern:    Diandra Angeles was seen in my clinic on 8/19/2021. If you have any questions or concerns, please don't hesitate to call.       Sincerely,         Carlos Vázquez, DO

## 2021-09-07 ENCOUNTER — TELEPHONE (OUTPATIENT)
Dept: FAMILY MEDICINE CLINIC | Age: 41
End: 2021-09-07

## 2021-09-07 ENCOUNTER — HOSPITAL ENCOUNTER (OUTPATIENT)
Age: 41
Discharge: HOME | End: 2021-09-07
Payer: COMMERCIAL

## 2021-09-07 DIAGNOSIS — I10 HYPERTENSION, ESSENTIAL: ICD-10-CM

## 2021-09-07 LAB
ALBUMIN SERPL-MCNC: 4 G/DL (ref 3.5–5.1)
ALP BLD-CCNC: 92 U/L (ref 38–126)
ALT SERPL-CCNC: 17 U/L (ref 11–66)
ANION GAP SERPL CALCULATED.3IONS-SCNC: 9 MEQ/L (ref 8–16)
AST SERPL-CCNC: 12 U/L (ref 5–40)
BASOPHILS # BLD: 0.5 %
BASOPHILS ABSOLUTE: 0 THOU/MM3 (ref 0–0.1)
BILIRUB SERPL-MCNC: 0.4 MG/DL (ref 0.3–1.2)
BUN BLDV-MCNC: 16 MG/DL (ref 7–22)
CALCIUM SERPL-MCNC: 9.3 MG/DL (ref 8.5–10.5)
CHLORIDE BLD-SCNC: 105 MEQ/L (ref 98–111)
CHOLESTEROL, TOTAL: 189 MG/DL (ref 100–199)
CO2: 25 MEQ/L (ref 23–33)
CREAT SERPL-MCNC: 0.7 MG/DL (ref 0.4–1.2)
CREATININE, URINE: 96.3 MG/DL
EOSINOPHIL # BLD: 2 %
EOSINOPHILS ABSOLUTE: 0.2 THOU/MM3 (ref 0–0.4)
ERYTHROCYTE [DISTWIDTH] IN BLOOD BY AUTOMATED COUNT: 13.9 % (ref 11.5–14.5)
ERYTHROCYTE [DISTWIDTH] IN BLOOD BY AUTOMATED COUNT: 44.9 FL (ref 35–45)
GFR SERPL CREATININE-BSD FRML MDRD: > 90 ML/MIN/1.73M2
GLUCOSE BLD-MCNC: 94 MG/DL (ref 70–108)
HCT VFR BLD CALC: 44.8 % (ref 37–47)
HDLC SERPL-MCNC: 45 MG/DL
HEMOGLOBIN: 13.8 GM/DL (ref 12–16)
IMMATURE GRANS (ABS): 0.05 THOU/MM3 (ref 0–0.07)
IMMATURE GRANULOCYTES: 0.5 %
LDL CHOLESTEROL CALCULATED: 121 MG/DL
LYMPHOCYTES # BLD: 26.4 %
LYMPHOCYTES ABSOLUTE: 2.5 THOU/MM3 (ref 1–4.8)
MCH RBC QN AUTO: 27.2 PG (ref 26–33)
MCHC RBC AUTO-ENTMCNC: 30.8 GM/DL (ref 32.2–35.5)
MCV RBC AUTO: 88.2 FL (ref 81–99)
MICROALBUMIN UR-MCNC: < 1.2 MG/DL
MICROALBUMIN/CREAT UR-RTO: 12 MG/G (ref 0–30)
MONOCYTES # BLD: 5.1 %
MONOCYTES ABSOLUTE: 0.5 THOU/MM3 (ref 0.4–1.3)
NUCLEATED RED BLOOD CELLS: 0 /100 WBC
PLATELET # BLD: 186 THOU/MM3 (ref 130–400)
PMV BLD AUTO: 11.6 FL (ref 9.4–12.4)
POTASSIUM SERPL-SCNC: 4.7 MEQ/L (ref 3.5–5.2)
RBC # BLD: 5.08 MILL/MM3 (ref 4.2–5.4)
SEG NEUTROPHILS: 65.5 %
SEGMENTED NEUTROPHILS ABSOLUTE COUNT: 6.2 THOU/MM3 (ref 1.8–7.7)
SODIUM BLD-SCNC: 139 MEQ/L (ref 135–145)
TOTAL PROTEIN: 7 G/DL (ref 6.1–8)
TRIGL SERPL-MCNC: 117 MG/DL (ref 0–199)
TSH SERPL DL<=0.05 MIU/L-ACNC: 2.13 UIU/ML (ref 0.4–4.2)
WBC # BLD: 9.5 THOU/MM3 (ref 4.8–10.8)

## 2021-09-07 PROCEDURE — 80061 LIPID PANEL: CPT

## 2021-09-07 PROCEDURE — 84443 ASSAY THYROID STIM HORMONE: CPT

## 2021-09-07 PROCEDURE — 36415 COLL VENOUS BLD VENIPUNCTURE: CPT

## 2021-09-07 PROCEDURE — 82043 UR ALBUMIN QUANTITATIVE: CPT

## 2021-09-07 PROCEDURE — 85025 COMPLETE CBC W/AUTO DIFF WBC: CPT

## 2021-09-07 PROCEDURE — 80053 COMPREHEN METABOLIC PANEL: CPT

## 2021-09-07 NOTE — TELEPHONE ENCOUNTER
----- Message from Torie Torres DO sent at 9/7/2021  1:44 PM EDT -----  Please let pt know that labs are stable and appropriate. Let me know if questions, thanks!

## 2021-09-17 NOTE — PROGRESS NOTES
Chief Complaint   Patient presents with    Follow-up     BP/obesity       History obtained from the patient. SUBJECTIVE:  Kartik Valiente is a 35 y.o.  that presents today for     -HTN:    HPI:  On micardis now  Changed from cozaar d/t nausea  Nausea improved on micardis  BP <140/90    Taking meds as prescribed ?: yes  Tolerating well ?: yes  Side Effects ?: no  BP at home ?: as above  Working on TLCS ?: yes  Chest Pain/SOB/Palpitations? no    BP Readings from Last 3 Encounters:   09/20/21 132/88   08/19/21 (!) 155/94   03/02/21 132/72       -Obesity LAST VISIT:  Wanted to discuss wts again today  Trouble losing wt  Has seen dietician, and doing great with diet  Is active and exercises regularly    UPDATE TODAY:   Here for f/u   BP's better  Doing exercise, calorie restriction  Would like to try adipex     Age/Gender Health Maintenance    Lipid -   Lab Results   Component Value Date    CHOL 189 09/07/2021    CHOL 170 10/21/2020    CHOL 200 (H) 11/06/2019     Lab Results   Component Value Date    TRIG 117 09/07/2021    TRIG 97 10/21/2020    TRIG 122 11/06/2019     Lab Results   Component Value Date    HDL 45 09/07/2021    HDL 45 10/21/2020    HDL 48 11/06/2019     Lab Results   Component Value Date    LDLCALC 121 09/07/2021    [address] [employer] 106 10/21/2020    [address] [employer] 128 11/06/2019     No results found for: LABVLDL, VLDL  No results found for: CHOLHDLRATIO      DM Screen -   Lab Results   Component Value Date    GLUCOSE 94 09/07/2021     TSH -   Lab Results   Component Value Date    TSH 2.130 09/07/2021       Colon Cancer Screening - age 46  Lung Cancer Screening (Age 52 to [de-identified] with 30 pack year hx, current smoker or quit within past 15 years) - never smoker. Tetanus - UTD MAY 2018  Influenza Vaccine - UTD FALL 2020  Pneumonia Vaccine - age 70  Zoster - age 46     Breast Cancer Screening - ABN MARCH 2021 with neg f/u imaging. Repeat 1 year.    Cervical Cancer Screening - hyst for benign reasons  Osteoporosis Screening - age 59      Current Outpatient Medications   Medication Sig Dispense Refill    phentermine (ADIPEX-P) 37.5 MG tablet Take 1 tablet by mouth every morning (before breakfast) for 30 days. 30 tablet 0    telmisartan (MICARDIS) 40 MG tablet Take 1 tablet by mouth daily 90 tablet 3    meloxicam (MOBIC) 15 MG tablet Take 1 tablet by mouth daily as needed for Pain 30 tablet 5    tiZANidine (ZANAFLEX) 4 MG tablet Take 1 tablet by mouth every 8 hours as needed (for back pain/spasm) 90 tablet 0    levothyroxine (SYNTHROID) 25 MCG tablet Take 1 tablet by mouth daily 90 tablet 3     No current facility-administered medications for this visit. Orders Placed This Encounter   Medications    phentermine (ADIPEX-P) 37.5 MG tablet     Sig: Take 1 tablet by mouth every morning (before breakfast) for 30 days. Dispense:  30 tablet     Refill:  0         All medications reviewed and reconciled, including OTC and herbal medications. Updated list given to patient.        Patient Active Problem List   Diagnosis    Patellofemoral stress syndrome of right knee    Right knee meniscal tear    Osteochondral defect of patella, right    Morbid obesity ([address]    Multiple thyroid nodules    Adrenal adenoma; bilateral. Stable and benign on CT    Hypothyroidism, postoperative    Hypertension, essential    S/P partial thyroidectomy    Chronic right-sided low back pain with right-sided sciatica    Degenerative disc disease, lumbar       Past Medical History:   Diagnosis Date    Adrenal adenoma; bilateral. Stable and benign on CT     Hypertension, essential 11/11/2019    Hypothyroidism, postoperative     Morbid obesity ([address]     S/P partial thyroidectomy 11/11/2019       Past Surgical History:   Procedure Laterality Date    BACK SURGERY  2009    post aa    CARPAL TUNNEL RELEASE Right     DILATION AND CURETTAGE OF UTERUS      HYSTERECTOMY      KNEE ARTHROSCOPY Right 11/05/2015    Medial Menesectomy with Intolerance, Polydipsia, Polyphagia, Polyuria      PHYSICAL EXAM:  Vitals:    09/20/21 0742   BP: 132/88   Pulse: 73   Resp: 12   Temp: 98 °F (36.7 °C)   TempSrc: Oral   SpO2: 98%   Weight: 292 lb 6 oz (132.6 kg)   Height: 5' 8.5\" (1.74 m)     Body mass index is 43.81 kg/m². Pain Score:   4 (Chronic low back pain)    VS Reviewed  General Appearance: A&O x 3, No acute distress,well developed and well- nourished  Eyes: pupils equal, round, and reactive to light, extraocular eye movements intact, conjunctivae and eye lids without erythema  ENT: external ear and ear canal clear bilaterally, TMs intact and regular, nose without deformity, nasal mucosa and turbinates normal without polyps, oropharynx normal, dentition is normal for age  Neck: supple and non-tender without mass, no thyromegaly or thyroid nodules, no cervical lymphadenopathy  Pulmonary/Chest: clear to auscultation bilaterally- no wheezes, rales or rhonchi, normal air movement, no respiratory distress or retractions  Cardiovascular: S1 and S2 auscultated w/ RRR. No murmurs, rubs, clicks, or gallops, distal pulses intact. Abdomen: soft, non-tender, non-distended, bowel sounds physiologic,  no rebound or guarding, no masses or hernias noted. Liver and spleen without enlargement. Extremities: no cyanosis, clubbing of the lower extremities. Trace bilat pedal edema. Dec sensation R 2nd toe. Skin: warm and dry, no rash or erythema      ASSESSMENT & PLAN  1. Hypertension, essential    Improved  con't micardis   Labs UTD    2. Morbid obesity with BMI of 40.0-44.9, adult ([facility]    Will add adipex to [facility]'s  Wt check 4 wks  F/u office 3 months  If doing well. Consider long-term med option    I discussed the risks and benefits of adipex again with the patient today including increases in HR and BP, N/V, headaches, palpitations, insomnia and dry mouth.  she stated today that she understands these risks and would like to continue with a prescription for the medication. OAARS reviewed and appropriate. Controlled Substances Monitoring:     Periodic Controlled Substance Monitoring: Possible medication side effects, risk of tolerance/dependence & alternative treatments discussed., No signs of potential drug abuse or diversion identified. Radhika Kothari DO)    - phentermine (ADIPEX-P) 37.5 MG tablet; Take 1 tablet by mouth every morning (before breakfast) for 30 days. Dispense: 30 tablet; Refill: 0      DISPOSITION    Return in about 3 months (around 12/20/2021) for f/u adipex, sooner as needed. Yamini Jaquez released without work restrictions. Future Appointments   Date Time Provider Junior Kim   10/19/2021  8:00 AM SCHEDULE, NURSE Vandana Bocanegra [facility]   11/2/2021  9:00 AM Radhika Kothari DO [facility]   12/20/2021  8:00 AM Radhika Kothari DO [facility] [place] [facility]   12/27/2021  9:00 AM DO CARLI Mendiola SRPX Pain [facility] [place]     PATIENT COUNSELING    Barriers to learning and self management: none    Discussed use, benefit, and side effects of prescribed medications. Barriers to medication compliance addressed. All patient questions answered. Pt voiced understanding.        Electronically signed by Radhika Kothari DO on 9/20/2021 at 8:03 AM

## 2021-09-20 ENCOUNTER — OFFICE VISIT (OUTPATIENT)
Dept: FAMILY MEDICINE CLINIC | Age: 41
End: 2021-09-20
Payer: COMMERCIAL

## 2021-09-20 VITALS
HEART RATE: 73 BPM | SYSTOLIC BLOOD PRESSURE: 132 MMHG | HEIGHT: 69 IN | DIASTOLIC BLOOD PRESSURE: 88 MMHG | OXYGEN SATURATION: 98 % | RESPIRATION RATE: 12 BRPM | WEIGHT: 292.38 LBS | BODY MASS INDEX: 43.3 KG/M2 | TEMPERATURE: 98 F

## 2021-09-20 DIAGNOSIS — I10 HYPERTENSION, ESSENTIAL: Primary | ICD-10-CM

## 2021-09-20 DIAGNOSIS — E66.01 MORBID OBESITY WITH BMI OF 40.0-44.9, ADULT (HCC): ICD-10-CM

## 2021-09-20 PROCEDURE — 99214 OFFICE O/P EST MOD 30 MIN: CPT | Performed by: FAMILY MEDICINE

## 2021-09-20 RX ORDER — PHENTERMINE HYDROCHLORIDE 37.5 MG/1
37.5 TABLET ORAL
Qty: 30 TABLET | Refills: 0 | Status: SHIPPED | OUTPATIENT
Start: 2021-09-20 | End: 2021-10-19 | Stop reason: SDUPTHER

## 2021-10-19 ENCOUNTER — TELEPHONE (OUTPATIENT)
Dept: FAMILY MEDICINE CLINIC | Age: 41
End: 2021-10-19

## 2021-10-19 ENCOUNTER — NURSE ONLY (OUTPATIENT)
Dept: FAMILY MEDICINE CLINIC | Age: 41
End: 2021-10-19

## 2021-10-19 VITALS — WEIGHT: 281.4 LBS | BODY MASS INDEX: 42.16 KG/M2

## 2021-10-19 DIAGNOSIS — E66.01 MORBID OBESITY (HCC): Primary | ICD-10-CM

## 2021-10-19 DIAGNOSIS — E66.01 MORBID OBESITY WITH BMI OF 40.0-44.9, ADULT (HCC): ICD-10-CM

## 2021-10-19 RX ORDER — PHENTERMINE HYDROCHLORIDE 37.5 MG/1
37.5 TABLET ORAL
Qty: 30 TABLET | Refills: 0 | Status: SHIPPED | OUTPATIENT
Start: 2021-10-19 | End: 2021-11-16 | Stop reason: SDUPTHER

## 2021-10-19 NOTE — PROGRESS NOTES
Pt presented to the office today for a weight check  Pt just finished the 1st month of adipex  Weight today 281.4 lbs  Wt Readings from Last 3 Encounters:   09/20/21 292 lb 6 oz (132.6 kg)   08/19/21 299 lb (135.6 kg)   04/28/21 299 lb (135.6 kg)

## 2021-10-19 NOTE — TELEPHONE ENCOUNTER
Pt presented to the office today for a weight check  Pt just finished the 1st month of adipex  Weight today 281.4 lbs  Wt Readings from Last 3 Encounters:   09/20/21 292 lb 6 oz (132.6 kg)   08/19/21 299 lb (135.6 kg)   04/28/21 299 lb (135.6 kg)     Pharmacy [facility]

## 2021-10-19 NOTE — TELEPHONE ENCOUNTER
ASSESSMENT & PLAN   Diagnosis Orders   1. Morbid obesity with BMI of 40.0-44.9, adult (HCC)  phentermine (ADIPEX-P) 37.5 MG tablet       OAARS reviewed and appropriate. Controlled Substances Monitoring: Periodic Controlled Substance Monitoring: Possible medication side effects, risk of tolerance/dependence & alternative treatments discussed., No signs of potential drug abuse or diversion identified.  Tara Brooks DO)      Future Appointments   Date Time Provider Junior Kim   11/16/2021  8:20 AM SCHEDULE, NURSE Dayne Rice [facility]   12/20/2021  8:00 AM Tara Brooks DO Fam Med [facility]   12/27/2021  9:00 AM Gavin Marshall DO N SRPX Pain [facility]

## 2021-11-16 ENCOUNTER — TELEPHONE (OUTPATIENT)
Dept: FAMILY MEDICINE CLINIC | Age: 41
End: 2021-11-16

## 2021-11-16 ENCOUNTER — NURSE ONLY (OUTPATIENT)
Dept: FAMILY MEDICINE CLINIC | Age: 41
End: 2021-11-16

## 2021-11-16 VITALS — WEIGHT: 276.8 LBS | BODY MASS INDEX: 41.48 KG/M2

## 2021-11-16 DIAGNOSIS — E66.01 MORBID OBESITY WITH BMI OF 40.0-44.9, ADULT (HCC): ICD-10-CM

## 2021-11-16 DIAGNOSIS — E66.01 MORBID OBESITY WITH BMI OF 40.0-44.9, ADULT (HCC): Primary | ICD-10-CM

## 2021-11-16 RX ORDER — PHENTERMINE HYDROCHLORIDE 37.5 MG/1
37.5 TABLET ORAL
Qty: 30 TABLET | Refills: 0 | Status: SHIPPED | OUTPATIENT
Start: 2021-11-16 | End: 2021-12-16

## 2021-11-16 NOTE — TELEPHONE ENCOUNTER
ASSESSMENT & PLAN   Diagnosis Orders   1. Morbid obesity with BMI of 40.0-44.9, adult (HCC)  phentermine (ADIPEX-P) 37.5 MG tablet       OAARS reviewed and appropriate. Controlled Substances Monitoring: Periodic Controlled Substance Monitoring: Possible medication side effects, risk of tolerance/dependence & alternative treatments discussed., No signs of potential drug abuse or diversion identified.  Lawrence Cardenas DO)      Future Appointments   Date Time Provider Junior Kim   12/20/2021  8:00 AM Lawrence Cardenas [facility] [place] [facility]   12/27/2021  9:00 AM Gavin Kathleen DO N SRPX Pain [facility] Readings from Last 3 Encounters:   11/16/21 276 lb 12.8 oz (125.6 kg)   10/19/21 281 lb 6.4 oz (127.6 kg)   09/20/21 292 lb 6 oz (132.6 kg)

## 2021-11-16 NOTE — PROGRESS NOTES
Pt presented to the office for a weight check for adipex  Today's weight 276.8 lbs  Wt Readings from Last 3 Encounters:   10/19/21 281 lb 6.4 oz (127.6 kg)   09/20/21 292 lb 6 oz (132.6 kg)   08/19/21 299 lb (135.6 kg)   Pt has completed her 2nd month of medication  Pharmacy [facility]

## 2021-12-09 ENCOUNTER — OFFICE VISIT (OUTPATIENT)
Dept: FAMILY MEDICINE CLINIC | Age: 41
End: 2021-12-09
Payer: COMMERCIAL

## 2021-12-09 ENCOUNTER — PATIENT MESSAGE (OUTPATIENT)
Dept: FAMILY MEDICINE CLINIC | Age: 41
End: 2021-12-09

## 2021-12-09 VITALS
RESPIRATION RATE: 14 BRPM | TEMPERATURE: 98.5 F | HEART RATE: 77 BPM | DIASTOLIC BLOOD PRESSURE: 78 MMHG | OXYGEN SATURATION: 98 % | SYSTOLIC BLOOD PRESSURE: 122 MMHG

## 2021-12-09 DIAGNOSIS — J40 BRONCHITIS: ICD-10-CM

## 2021-12-09 DIAGNOSIS — J01.90 ACUTE RHINOSINUSITIS: Primary | ICD-10-CM

## 2021-12-09 LAB
INFLUENZA VIRUS A RNA: NEGATIVE
INFLUENZA VIRUS B RNA: NEGATIVE
Lab: NORMAL
QC PASS/FAIL: NORMAL
SARS-COV-2 RDRP RESP QL NAA+PROBE: NEGATIVE

## 2021-12-09 PROCEDURE — 99213 OFFICE O/P EST LOW 20 MIN: CPT | Performed by: FAMILY MEDICINE

## 2021-12-09 PROCEDURE — 87502 INFLUENZA DNA AMP PROBE: CPT | Performed by: FAMILY MEDICINE

## 2021-12-09 PROCEDURE — 87635 SARS-COV-2 COVID-19 AMP PRB: CPT | Performed by: FAMILY MEDICINE

## 2021-12-09 RX ORDER — ALBUTEROL SULFATE 90 UG/1
2 AEROSOL, METERED RESPIRATORY (INHALATION) EVERY 4 HOURS PRN
Qty: 18 G | Refills: 0 | Status: SHIPPED | OUTPATIENT
Start: 2021-12-09 | End: 2022-01-08

## 2021-12-09 RX ORDER — AMOXICILLIN AND CLAVULANATE POTASSIUM 875; 125 MG/1; MG/1
1 TABLET, FILM COATED ORAL 2 TIMES DAILY
Qty: 20 TABLET | Refills: 0 | Status: SHIPPED | OUTPATIENT
Start: 2021-12-09 | End: 2021-12-19

## 2021-12-09 RX ORDER — BENZONATATE 100 MG/1
CAPSULE ORAL
Qty: 60 CAPSULE | Refills: 0 | Status: SHIPPED | OUTPATIENT
Start: 2021-12-09 | End: 2021-12-19

## 2021-12-09 RX ORDER — FLUTICASONE PROPIONATE 50 MCG
1 SPRAY, SUSPENSION (ML) NASAL DAILY
Qty: 16 G | Refills: 0 | Status: SHIPPED | OUTPATIENT
Start: 2021-12-09 | End: 2021-12-23

## 2021-12-09 NOTE — PROGRESS NOTES
Chief Complaint   Patient presents with    Cough    Fever    Wheezing    Fatigue       History obtained from the patient. SUBJECTIVE:  Bette Grimes is a 35 y.o.  that presents today for     -URI type sxs:   Going on 4 to 5 days  Nasal congestion  Drainage   Ear pain  Cough  Fatigue  Body aches  Fever yesterday, tmax to 100.4  OTC meds helping little  Mild SOB  Some wheezing    Fever - Yes  Runny nose or congestion -  Yes   Cough -  Yes  Sore throat -  Yes  Headache, fatigue, joint pains, muscle aches -  Yes  Double Sickening - Yes  Shortness of breath/Wheezing? -  As above  Nausea/Vomiting/Diarrhea? No  Sick contacts - Yes  Maxillary Tooth Pain -  Yes  Treatment tried and response - otc meds, a little better      Age/Gender Health Maintenance    Lipid -   Lab Results   Component Value Date    CHOL 189 09/07/2021    CHOL 170 10/21/2020    CHOL 200 (H) 11/06/2019     Lab Results   Component Value Date    TRIG 117 09/07/2021    TRIG 97 10/21/2020    TRIG 122 11/06/2019     Lab Results   Component Value Date    HDL 45 09/07/2021    HDL 45 10/21/2020    HDL 48 11/06/2019     Lab Results   Component Value Date    LDLCALC 121 09/07/2021    [address] 106 10/21/2020    [address] 128 11/06/2019     No results found for: LABVLDL, VLDL  No results found for: CHOLHDLRATIO      DM Screen -   Lab Results   Component Value Date    GLUCOSE 94 09/07/2021     TSH -   Lab Results   Component Value Date    TSH 2.130 09/07/2021       Colon Cancer Screening - age 46  Lung Cancer Screening (Age 52 to [de-identified] with 30 pack year hx, current smoker or quit within past 15 years) - never smoker. Tetanus - UTD MAY 2018  Influenza Vaccine - UTD FALL 2020  Pneumonia Vaccine - age 70  Zoster - age 46     Breast Cancer Screening - ABN MARCH 2021 with neg f/u imaging. Repeat 1 year.    Cervical Cancer Screening - hyst for benign reasons  Osteoporosis Screening - age 59      Current Outpatient Medications   Medication Sig Dispense Refill    acetaminophen (TYLENOL) 325 MG suppository Place 325 mg rectally every 4 hours as needed for Fever      guaiFENesin (ROBITUSSIN CHEST CONGESTION PO) Take by mouth      amoxicillin-clavulanate (AUGMENTIN) 875-125 MG per tablet Take 1 tablet by mouth 2 times daily for 10 days 20 tablet 0    benzonatate (TESSALON PERLES) 100 MG capsule Take 1 to 2 tablets by mouth every 8 hours as needed for cough. 60 capsule 0    fluticasone (FLONASE) 50 MCG/ACT nasal spray 1 spray by Nasal route daily for 14 days 16 g 0    albuterol sulfate HFA (VENTOLIN HFA) 108 (90 Base) MCG/ACT inhaler Inhale 2 puffs into the lungs every 4 hours as needed for Wheezing or Shortness of Breath 18 g 0    phentermine (ADIPEX-P) 37.5 MG tablet Take 1 tablet by mouth every morning (before breakfast) for 30 days. 30 tablet 0    telmisartan (MICARDIS) 40 MG tablet Take 1 tablet by mouth daily 90 tablet 3    meloxicam (MOBIC) 15 MG tablet Take 1 tablet by mouth daily as needed for Pain 30 tablet 5    tiZANidine (ZANAFLEX) 4 MG tablet Take 1 tablet by mouth every 8 hours as needed (for back pain/spasm) 90 tablet 0    levothyroxine (SYNTHROID) 25 MCG tablet Take 1 tablet by mouth daily 90 tablet 3     No current facility-administered medications for this visit. Orders Placed This Encounter   Medications    amoxicillin-clavulanate (AUGMENTIN) 875-125 MG per tablet     Sig: Take 1 tablet by mouth 2 times daily for 10 days     Dispense:  20 tablet     Refill:  0    benzonatate (TESSALON PERLES) 100 MG capsule     Sig: Take 1 to 2 tablets by mouth every 8 hours as needed for cough.      Dispense:  60 capsule     Refill:  0    fluticasone (FLONASE) 50 MCG/ACT nasal spray     Si spray by Nasal route daily for 14 days     Dispense:  16 g     Refill:  0    albuterol sulfate HFA (VENTOLIN HFA) 108 (90 Base) MCG/ACT inhaler     Sig: Inhale 2 puffs into the lungs every 4 hours as needed for Wheezing or Shortness of Breath     Dispense:  18 g Refill:  0         All medications reviewed and reconciled, including OTC and herbal medications. Updated list given to patient. Patient Active Problem List   Diagnosis    Patellofemoral stress syndrome of right knee    Right knee meniscal tear    Osteochondral defect of patella, right    Morbid obesity ([address]    Multiple thyroid nodules    Adrenal adenoma; bilateral. Stable and benign on CT    Hypothyroidism, postoperative    Hypertension, essential    S/P partial thyroidectomy    Chronic right-sided low back pain with right-sided sciatica    Degenerative disc disease, lumbar       Past Medical History:   Diagnosis Date    Adrenal adenoma; bilateral. Stable and benign on CT     Hypertension, essential 11/11/2019    Hypothyroidism, postoperative     Morbid obesity ([address]     S/P partial thyroidectomy 11/11/2019       Past Surgical History:   Procedure Laterality Date    BACK SURGERY  2009    post aa    CARPAL TUNNEL RELEASE Right     DILATION AND CURETTAGE OF UTERUS      HYSTERECTOMY      KNEE ARTHROSCOPY Right 11/05/2015    Medial Menesectomy with Chrondroplasty - Dr Alissa Kee N/A 3/9/2018    DIAGNOSTIC LAPAROSCOPY, APPENDECTOMY, infarcted appendix plancha performed by Faith Eugene MD at [address] [place] [address] Avenue  age 19    endometriosis.      THYROID LOBECTOMY Right 10/2019    [facility] [place]    TONSILLECTOMY         No Known Allergies    Social History     Tobacco Use    Smoking status: Never Smoker    Smokeless tobacco: Never Used   Substance Use Topics    Alcohol use: No       Family History   Problem Relation Age of Onset    Colon Cancer Paternal Grandmother 80    Breast Cancer Paternal Grandmother 52    Breast Cancer Maternal Grandmother 39    High Blood Pressure Mother     Diabetes Father     Lung Cancer Maternal Uncle 72    Breast Cancer Paternal Aunt 39    Cancer Paternal Uncle         pt not sure of orgin of cancer    Cancer Maternal Uncle pt unsure of orgin of cancer    Ovarian Cancer Neg Hx     Tuberculosis Neg Hx          I have reviewed the patient's past medical history, past surgical history, allergies, medications, social and family history and I have made updates where appropriate. Review of Systems  Positive responses are highlighted in bold    Constitutional:  Fever, Chills, Night Sweats, Fatigue, Unexpected changes in weight  HENT:  Ear pain, Tinnitus, Nosebleeds, Trouble swallowing, Hearing loss, Sore throat  Cardiovascular:  Chest Pain, Palpitations, Orthopnea, Paroxysmal Nocturnal Dyspnea  Respiratory:  Cough, Wheezing, Shortness of breath, Chest tightness, Apnea  Gastrointestinal:  Nausea, Vomiting, Diarrhea, Constipation, Heartburn, Blood in stool  Genitourinary:  Difficulty or painful urination, Flank pain, Change in frequency, Urgency  Skin:  Color change, Rash, Itching, Wound  Musculoskeletal:  Joint pain, Back pain, Gait problems, Joint swelling, Myalgias  Neurological:  Dizziness, Headaches, Presyncope, Numbness, Seizures, Tremors  Endocrine:  Heat Intolerance, Cold Intolerance, Polydipsia, Polyphagia, Polyuria      PHYSICAL EXAM:  Vitals:    12/09/21 1346   BP: 122/78   Pulse: 77   Resp: 14   Temp: 98.5 °F (36.9 °C)   TempSrc: Oral   SpO2: 98%     There is no height or weight on file to calculate BMI. Pain Score:   2 (Throat)    VS Reviewed  General Appearance: A&O x 3, No acute distress,well developed and well- nourished  Eyes: pupils equal, round, and reactive to light, extraocular eye movements intact, conjunctivae and eye lids without erythema  ENT: bilateral TM normal without fluid or infection, neck without nodes, throat normal without erythema or exudate, sinuses nontender, post nasal drip noted, nasal mucosa congested and Oropharynx clear, without erythema, exudate, or thrush.   Neck: supple and non-tender without mass, no thyromegaly or thyroid nodules, no cervical lymphadenopathy  Pulmonary/Chest: good air movement bilaterally. Scattered wheezing and rhonchi. No crackles. No accessory muscle use. No distress. Cardiovascular: S1 and S2 auscultated w/ RRR. No murmurs, rubs, clicks, or gallops, distal pulses intact. Abdomen: soft, non-tender, non-distended, bowel sounds physiologic,  no rebound or guarding, no masses or hernias noted. Liver and spleen without enlargement. Extremities: no cyanosis, clubbing of the lower extremities. Trace bilat pedal edema. Dec sensation R 2nd toe. Skin: warm and dry, no rash or erythema      Component      Latest Ref Rng & Units 12/9/2021   SARS-COV-2, RdRp gene      Negative Negative   Lot Number       [id number]   QC Pass/Fail       Pass   Influenza virus A RNA       Negative   Influenza virus B RNA       Negative       ASSESSMENT & PLAN  1. Acute rhinosinusitis    VSS  Exam reassuring  Covid/flu neg  Augmentin, prn alb, tessalon and flonase  F/u if no better  Reviewed ER precautions, pt understands. Off work tomorrow  RTW Saturday    - POCT COVID-19, Rapid  - POCT Influenza A/B DNA  - amoxicillin-clavulanate (AUGMENTIN) 875-125 MG per tablet; Take 1 tablet by mouth 2 times daily for 10 days  Dispense: 20 tablet; Refill: 0  - benzonatate (TESSALON PERLES) 100 MG capsule; Take 1 to 2 tablets by mouth every 8 hours as needed for cough. Dispense: 60 capsule; Refill: 0  - fluticasone (FLONASE) 50 MCG/ACT nasal spray; 1 spray by Nasal route daily for 14 days  Dispense: 16 g; Refill: 0  - albuterol sulfate HFA (VENTOLIN HFA) 108 (90 Base) MCG/ACT inhaler; Inhale 2 puffs into the lungs every 4 hours as needed for Wheezing or Shortness of Breath  Dispense: 18 g; Refill: 0    2. Bronchitis      - POCT COVID-19, Rapid  - POCT Influenza A/B DNA  - amoxicillin-clavulanate (AUGMENTIN) 875-125 MG per tablet; Take 1 tablet by mouth 2 times daily for 10 days  Dispense: 20 tablet; Refill: 0  - benzonatate (TESSALON PERLES) 100 MG capsule;  Take 1 to 2 tablets by mouth every 8 hours as needed for cough.  Dispense: 60 capsule; Refill: 0  - fluticasone (FLONASE) 50 MCG/ACT nasal spray; 1 spray by Nasal route daily for 14 days  Dispense: 16 g; Refill: 0  - albuterol sulfate HFA (VENTOLIN HFA) 108 (90 Base) MCG/ACT inhaler; Inhale 2 puffs into the lungs every 4 hours as needed for Wheezing or Shortness of Breath  Dispense: 18 g; Refill: 0      DISPOSITION    Return if symptoms worsen or fail to improve. Chikis Jyoti released with work restrictions. Future Appointments   Date Time Provider Junior Kim   12/20/2021  8:00 AM [address]   12/27/2021  9:00 AM Gavin Arora DO N SRPX Pain [facility]     PATIENT COUNSELING    Barriers to learning and self management: none    Discussed use, benefit, and side effects of prescribed medications. Barriers to medication compliance addressed. All patient questions answered. Pt voiced understanding.        Electronically signed by Rochelle Palomares DO on 12/9/2021 at 2:27 PM

## 2021-12-09 NOTE — TELEPHONE ENCOUNTER
Future Appointments   Date Time Provider Junior Kim   12/9/2021  1:40 PM [facility]   12/20/2021  8:00 AM Azar Waters DO [facility] FAITH.[facility]   12/27/2021  9:00 AM DO CARLI Coleman SRPX Pain [facility] II.DAVID

## 2021-12-19 NOTE — PROGRESS NOTES
Chief Complaint   Patient presents with    Follow-up     addipex     Other     red  spot on red eye lid  for  2 months        History obtained from the patient. SUBJECTIVE:  Serafin Schilder is a 35 y.o.  that presents today for     -Obesity PRIOR VISIT:  Wanted to discuss wts again today  Trouble losing wt  Has seen dietician, and doing great with diet  Is active and exercises regularly    UPDATE LAST VISIT:   Here for f/u   BP's better  Doing exercise, calorie restriction  Would like to try adipex    UPDATE TODAY:   Has completed adipex x 3 months  Started at 290+  Good life style changes  wts down to 268lb     Wt Readings from Last 3 Encounters:   12/20/21 268 lb (121.6 kg)   11/16/21 276 lb 12.8 oz (125.6 kg)   10/19/21 281 lb 6.4 oz (127.6 kg)       - R eye issue:  Red spot on medial part of upper eye lid  On upper eyelid, medial side  No pain   No vision issues       Age/Gender Health Maintenance    Lipid -   Lab Results   Component Value Date    CHOL 189 09/07/2021    CHOL 170 10/21/2020    CHOL 200 (H) 11/06/2019     Lab Results   Component Value Date    TRIG 117 09/07/2021    TRIG 97 10/21/2020    TRIG 122 11/06/2019     Lab Results   Component Value Date    HDL 45 09/07/2021    HDL 45 10/21/2020    HDL 48 11/06/2019     Lab Results   Component Value Date    LDLCALC 121 09/07/2021    [address] 106 10/21/2020    [address] 128 11/06/2019     No results found for: LABVLDL, VLDL  No results found for: CHOLHDLRATIO      DM Screen -   Lab Results   Component Value Date    GLUCOSE 94 09/07/2021     TSH -   Lab Results   Component Value Date    TSH 2.130 09/07/2021       Colon Cancer Screening - age 46  Lung Cancer Screening (Age 52 to [de-identified] with 30 pack year hx, current smoker or quit within past 15 years) - never smoker. Tetanus - UTD MAY 2018  Influenza Vaccine - UTD FALL 2021 per pt  Pneumonia Vaccine - age 70  Zoster - age 46     Breast Cancer Screening - ABN MARCH 2021 with neg f/u imaging. Repeat 1 year. Cervical Cancer Screening - hyst for benign reasons  Osteoporosis Screening - age 59      Current Outpatient Medications   Medication Sig Dispense Refill    erythromycin (ROMYCIN) 5 MG/GM ophthalmic ointment Apply to affected eyelid nightly x 2 weeks. 1 g 0    acetaminophen (TYLENOL) 325 MG suppository Place 325 mg rectally every 4 hours as needed for Fever      fluticasone (FLONASE) 50 MCG/ACT nasal spray 1 spray by Nasal route daily for 14 days 16 g 0    albuterol sulfate HFA (VENTOLIN HFA) 108 (90 Base) MCG/ACT inhaler Inhale 2 puffs into the lungs every 4 hours as needed for Wheezing or Shortness of Breath 18 g 0    telmisartan (MICARDIS) 40 MG tablet Take 1 tablet by mouth daily 90 tablet 3    meloxicam (MOBIC) 15 MG tablet Take 1 tablet by mouth daily as needed for Pain 30 tablet 5    tiZANidine (ZANAFLEX) 4 MG tablet Take 1 tablet by mouth every 8 hours as needed (for back pain/spasm) 90 tablet 0    levothyroxine (SYNTHROID) 25 MCG tablet Take 1 tablet by mouth daily 90 tablet 3     No current facility-administered medications for this visit. Orders Placed This Encounter   Medications    erythromycin (ROMYCIN) 5 MG/GM ophthalmic ointment     Sig: Apply to affected eyelid nightly x 2 weeks. Dispense:  1 g     Refill:  0         All medications reviewed and reconciled, including OTC and herbal medications. Updated list given to patient.        Patient Active Problem List   Diagnosis    Patellofemoral stress syndrome of right knee    Right knee meniscal tear    Osteochondral defect of patella, right    Morbid obesity ([address]    Multiple thyroid nodules    Adrenal adenoma; bilateral. Stable and benign on CT    Hypothyroidism, postoperative    Hypertension, essential    S/P partial thyroidectomy    Chronic right-sided low back pain with right-sided sciatica    Degenerative disc disease, lumbar       Past Medical History:   Diagnosis Date    Adrenal adenoma; bilateral. Stable and benign on CT     Hypertension, essential 11/11/2019    Hypothyroidism, postoperative     Morbid obesity ([address]     S/P partial thyroidectomy 11/11/2019       Past Surgical History:   Procedure Laterality Date    BACK SURGERY  2009    post aa    CARPAL TUNNEL RELEASE Right     DILATION AND CURETTAGE OF UTERUS      HYSTERECTOMY      KNEE ARTHROSCOPY Right 11/05/2015    Medial Menesectomy with Chrondroplasty - Dr Jeaneth Cantu N/A 3/9/2018    DIAGNOSTIC LAPAROSCOPY, APPENDECTOMY, infarcted appendix plancha performed by Moon Abad MD at [address]  age 19    endometriosis.  THYROID LOBECTOMY Right 10/2019    [facility] [place]    TONSILLECTOMY         No Known Allergies    Social History     Tobacco Use    Smoking status: Never Smoker    Smokeless tobacco: Never Used   Substance Use Topics    Alcohol use: No       Family History   Problem Relation Age of Onset    Colon Cancer Paternal Grandmother 80    Breast Cancer Paternal Grandmother 52    Breast Cancer Maternal Grandmother 39    High Blood Pressure Mother     Diabetes Father     Lung Cancer Maternal Uncle 72    Breast Cancer Paternal Aunt 40    Cancer Paternal Uncle         pt not sure of orgin of cancer    Cancer Maternal Uncle         pt unsure of orgin of cancer    Ovarian Cancer Neg Hx     Tuberculosis Neg Hx          I have reviewed the patient's past medical history, past surgical history, allergies, medications, social and family history and I have made updates where appropriate.       Review of Systems  Positive responses are highlighted in bold    Constitutional:  Fever, Chills, Night Sweats, Fatigue, Unexpected changes in weight  HENT:  Ear pain, Tinnitus, Nosebleeds, Trouble swallowing, Hearing loss, Sore throat  Cardiovascular:  Chest Pain, Palpitations, Orthopnea, Paroxysmal Nocturnal Dyspnea  Respiratory:  Cough, Wheezing, Shortness of breath, Chest tightness, Apnea  Gastrointestinal:  Nausea, Vomiting, Diarrhea, Constipation, Heartburn, Blood in stool  Genitourinary:  Difficulty or painful urination, Flank pain, Change in frequency, Urgency  Skin:  Color change, Rash, Itching, Wound  Musculoskeletal:  Joint pain, Back pain, Gait problems, Joint swelling, Myalgias  Neurological:  Dizziness, Headaches, Presyncope, Numbness, Seizures, Tremors  Endocrine:  Heat Intolerance, Cold Intolerance, Polydipsia, Polyphagia, Polyuria      PHYSICAL EXAM:  Vitals:    12/20/21 0808   BP: 136/76   Pulse: 85   Resp: 12   Temp: 97.9 °F (36.6 °C)   TempSrc: Oral   SpO2: 100%   Weight: 268 lb (121.6 kg)   Height: 5' 9\" (1.753 m)     Body mass index is 39.58 kg/m². Pain Score:   0 - No pain    VS Reviewed  General Appearance: A&O x 3, No acute distress,well developed and well- nourished  Eyes: pupils equal, round, and reactive to light, extraocular eye movements intact, conjunctivae and eye lids without erythema other than mild erythema medial R upper eye lid  ENT: external ear and ear canal clear bilaterally, TMs intact and regular, nose without deformity, nasal mucosa and turbinates normal without polyps, oropharynx normal, dentition is normal for age  Neck: supple and non-tender without mass, no thyromegaly or thyroid nodules, no cervical lymphadenopathy  Pulmonary/Chest: clear to auscultation bilaterally- no wheezes, rales or rhonchi, normal air movement, no respiratory distress or retractions  Cardiovascular: S1 and S2 auscultated w/ RRR. No murmurs, rubs, clicks, or gallops, distal pulses intact. Abdomen: soft, non-tender, non-distended, bowel sounds physiologic,  no rebound or guarding, no masses or hernias noted. Liver and spleen without enlargement. Extremities: no cyanosis, clubbing of the lower extremities. Trace bilat pedal edema. Dec sensation R 2nd toe. Skin: warm and dry, no rash or erythema      ASSESSMENT & PLAN  1.  Morbid obesity with BMI of 40.0-44.9, adult ([facility]    Improving with 3 months of adipex  Declines long-term meds at this time  con't therapeutic life style changes  F/u 6 months    2. Squamous blepharitis of right upper eyelid    Lid hygiene  Erythromycin op qhs x 2 wks  F/u 3 to 4 wks if not resolved    - erythromycin (ROMYCIN) 5 MG/GM ophthalmic ointment; Apply to affected eyelid nightly x 2 weeks. Dispense: 1 g; Refill: 0      DISPOSITION    Return in about 6 months (around 6/20/2022) for follow-up on chronic medical conditions, sooner as needed. Wyatt Chase released without work restrictions. Future Appointments   Date Time Provider Junior Reesei   12/27/2021  9:00 AM [facility] [address]   6/20/2022  8:40 AM Marilu Torres DO [facility] [address] [facility]     PATIENT COUNSELING    Barriers to learning and self management: none    Discussed use, benefit, and side effects of prescribed medications. Barriers to medication compliance addressed. All patient questions answered. Pt voiced understanding.        Electronically signed by Marilu Torres DO on 12/20/2021 at 8:27 AM

## 2021-12-20 ENCOUNTER — OFFICE VISIT (OUTPATIENT)
Dept: FAMILY MEDICINE CLINIC | Age: 41
End: 2021-12-20
Payer: COMMERCIAL

## 2021-12-20 VITALS
HEART RATE: 85 BPM | DIASTOLIC BLOOD PRESSURE: 76 MMHG | RESPIRATION RATE: 12 BRPM | WEIGHT: 268 LBS | OXYGEN SATURATION: 100 % | TEMPERATURE: 97.9 F | BODY MASS INDEX: 39.69 KG/M2 | HEIGHT: 69 IN | SYSTOLIC BLOOD PRESSURE: 136 MMHG

## 2021-12-20 DIAGNOSIS — H01.021 SQUAMOUS BLEPHARITIS OF RIGHT UPPER EYELID: ICD-10-CM

## 2021-12-20 DIAGNOSIS — E66.01 MORBID OBESITY WITH BMI OF 40.0-44.9, ADULT (HCC): Primary | ICD-10-CM

## 2021-12-20 PROCEDURE — 99213 OFFICE O/P EST LOW 20 MIN: CPT | Performed by: FAMILY MEDICINE

## 2021-12-20 RX ORDER — ERYTHROMYCIN 5 MG/G
OINTMENT OPHTHALMIC
Qty: 1 G | Refills: 0 | Status: SHIPPED | OUTPATIENT
Start: 2021-12-20 | End: 2021-12-30

## 2021-12-20 NOTE — PATIENT INSTRUCTIONS
Patient Education        Blepharitis: Care Instructions  Your Care Instructions     Blepharitis is an inflammation or infection of the eyelids. It causes dry, scaly crusts on the eyelids. It can also cause your eyes to itch, burn, and look red. This problem is more common in people who have rosacea, dandruff, skin allergies, or eczema. Home treatment can help you keep your eyes comfortable. Your doctor may also prescribe an ointment to put on your eyelids. Follow-up care is a key part of your treatment and safety. Be sure to make and go to all appointments, and call your doctor if you are having problems. It's also a good idea to know your test results and keep a list of the medicines you take. How can you care for yourself at home? · Wash your eyelids and eyebrows daily with baby shampoo. To wash your eyelids:  ? Place a very warm washcloth over your eyes for about a minute. This will help soften and loosen the crusts on your eyelashes. ? Put a few drops of baby shampoo on a warm washcloth. ? Gently wipe your eyelids. This helps remove any crust. It also cleans your eyelids. ? Rinse well with water. · Be safe with medicines. If your doctor prescribed medicine for you, use it exactly as directed. Call your doctor if you think you are having a problem with your medicine. When should you call for help? Call your doctor now or seek immediate medical care if:    · You have signs of an eye infection, such as:  ? Pus or thick discharge coming from the eye.  ? Redness or swelling around the eye.  ? A fever. Watch closely for changes in your health, and be sure to contact your doctor if:    · You have vision changes.     · You do not get better as expected. Where can you learn more? Go to [email or web address] and sign in to your [employer] account. Enter F356 in the SmartCrow[employer] box to learn more about \"Blepharitis: Care Instructions. \"     If you do not have an account, please click on the \"Sign Up Now\" link. Current as of: April 29, 2021               Content Version: 13.0  © 4351-4864 Healthwise, Incorporated. Care instructions adapted under license by [facility] [address] If you have questions about a medical condition or this instruction, always ask your healthcare professional. [address] any warranty or liability for your use of this information.

## 2022-03-01 DIAGNOSIS — E89.0 POSTOPERATIVE HYPOTHYROIDISM: ICD-10-CM

## 2022-03-01 DIAGNOSIS — G89.29 CHRONIC RIGHT-SIDED LOW BACK PAIN WITH RIGHT-SIDED SCIATICA: ICD-10-CM

## 2022-03-01 DIAGNOSIS — M54.41 CHRONIC RIGHT-SIDED LOW BACK PAIN WITH RIGHT-SIDED SCIATICA: ICD-10-CM

## 2022-03-01 DIAGNOSIS — M51.36 DEGENERATIVE DISC DISEASE, LUMBAR: ICD-10-CM

## 2022-03-01 RX ORDER — MELOXICAM 15 MG/1
15 TABLET ORAL DAILY PRN
Qty: 30 TABLET | Refills: 5 | Status: SHIPPED | OUTPATIENT
Start: 2022-03-01 | End: 2022-10-24 | Stop reason: SDUPTHER

## 2022-03-01 RX ORDER — LEVOTHYROXINE SODIUM 0.03 MG/1
25 TABLET ORAL DAILY
Qty: 90 TABLET | Refills: 3 | Status: SHIPPED | OUTPATIENT
Start: 2022-03-01

## 2022-03-01 NOTE — TELEPHONE ENCOUNTER
Last visit- 12/20/2021  Next visit- 6/20/2022    Requested Prescriptions     Pending Prescriptions Disp Refills    meloxicam (MOBIC) 15 MG tablet 30 tablet 5     Sig: Take 1 tablet by mouth daily as needed for Pain    levothyroxine (SYNTHROID) 25 MCG tablet 90 tablet 3     Sig: Take 1 tablet by mouth daily

## 2022-06-20 ENCOUNTER — TELEPHONE (OUTPATIENT)
Dept: FAMILY MEDICINE CLINIC | Age: 42
End: 2022-06-20

## 2022-06-27 NOTE — PROGRESS NOTES
Chief Complaint   Patient presents with    Follow-up     HTN , weight loss, back pain        History obtained from the patient. SUBJECTIVE:  Modesta Kim is a 38 y.o.  that presents today for     -Obesity PRIOR VISIT:  Wanted to discuss wts again today  Trouble losing wt  Has seen dietician, and doing great with diet  Is active and exercises regularly    UPDATE PRIOR VISIT:   Here for f/u   BP's better  Doing exercise, calorie restriction  Would like to try adipex    UPDATE LAST VISIT:   Has completed adipex x 3 months  Started at 290+  Good life style changes  wts down to 268lb     UPDATE TODAY:   Wt up to 277  Below 290+ where she was before  Working on lifestyle changes, had slacked off  Completed adipex 6+ months ago  Would like to try agan    Wt Readings from Last 3 Encounters:   06/28/22 277 lb (125.6 kg)   12/20/21 268 lb (121.6 kg)   11/16/21 276 lb 12.8 oz (125.6 kg)       -HTN:     HPI:  On micardis now  Changed from cozaar d/t nausea a while back  Nausea improved on micardis  BP <140/90     Taking meds as prescribed ?: yes  Tolerating well ?: yes  Side Effects ?: no  BP at home ?: as above  Working on TLCS ?: yes  Chest Pain/SOB/Palpitations? no    BP Readings from Last 3 Encounters:   06/28/22 120/68   12/20/21 136/76   12/09/21 122/78       -Hypothyroidism:     HPI:  S/p R thyroid lobectomy for large cyest Fall 2019 in [address] [place] [address]    Currently treated for Hypothyroidism? Yes  Fatigue? No  Recent change in weight? No  Cold/Heat intolerance? No  Diarrhea/Constipation? No  Diaphoresis? No  Anxiety? No  Palpitations? No   Hair Loss?   No    Lab Results   Component Value Date    TSH 2.130 09/07/2021       -Chronic low back pain:  Chronic issue  Done with PT  Had MRI, showed arthritic changes  Pt asked to see a surgeon, who agreed didn't need surgery  Was referred to pain management, but did not go  Better than it was before  Has less radicular sxs  Doesn't want to do pain management right now  Wants to work on wt loss  No bowel/bladder issues       Age/Gender Health Maintenance    Lipid -   Lab Results   Component Value Date    CHOL 189 09/07/2021    CHOL 170 10/21/2020    CHOL 200 (H) 11/06/2019     Lab Results   Component Value Date    TRIG 117 09/07/2021    TRIG 97 10/21/2020    TRIG 122 11/06/2019     Lab Results   Component Value Date    HDL 45 09/07/2021    HDL 45 10/21/2020    HDL 48 11/06/2019     Lab Results   Component Value Date    LDLCALC 121 09/07/2021    [address] 106 10/21/2020    [address] 128 11/06/2019     No results found for: LABVLDL, VLDL  No results found for: CHOLHDLRATIO      DM Screen -   Lab Results   Component Value Date    GLUCOSE 94 09/07/2021     TSH -   Lab Results   Component Value Date    TSH 2.130 09/07/2021       Colon Cancer Screening - age 37  Lung Cancer Screening (Age 52 to [de-identified] with 30 pack year hx, current smoker or quit within past 15 years) - never smoker. Tetanus - UTD MAY 2018  Influenza Vaccine - UTD FALL 2021 per pt  Pneumonia Vaccine - age 70  Zoster - age 46     Breast Cancer Screening - ABN MARCH 2021 with neg f/u imaging. Repeat 1 year. Cervical Cancer Screening - hyst for benign reasons  Osteoporosis Screening - age 59      Current Outpatient Medications   Medication Sig Dispense Refill    phentermine (ADIPEX-P) 37.5 MG tablet Take 1 tablet by mouth every morning (before breakfast) for 30 days. 30 tablet 0    meloxicam (MOBIC) 15 MG tablet Take 1 tablet by mouth daily as needed for Pain 30 tablet 5    levothyroxine (SYNTHROID) 25 MCG tablet Take 1 tablet by mouth daily 90 tablet 3    acetaminophen (TYLENOL) 325 MG suppository Place 325 mg rectally every 4 hours as needed for Fever      telmisartan (MICARDIS) 40 MG tablet Take 1 tablet by mouth daily 90 tablet 3    tiZANidine (ZANAFLEX) 4 MG tablet Take 1 tablet by mouth every 8 hours as needed (for back pain/spasm) 90 tablet 0     No current facility-administered medications for this visit. Orders Placed This Encounter   Medications    phentermine (ADIPEX-P) 37.5 MG tablet     Sig: Take 1 tablet by mouth every morning (before breakfast) for 30 days. Dispense:  30 tablet     Refill:  0         All medications reviewed and reconciled, including OTC and herbal medications. Updated list given to patient. Patient Active Problem List   Diagnosis    Patellofemoral stress syndrome of right knee    Right knee meniscal tear    Osteochondral defect of patella, right    Morbid obesity ([address]    Multiple thyroid nodules    Adrenal adenoma; bilateral. Stable and benign on CT    Hypothyroidism, postoperative    Hypertension, essential    S/P partial thyroidectomy    Chronic right-sided low back pain with right-sided sciatica    Degenerative disc disease, lumbar       Past Medical History:   Diagnosis Date    Adrenal adenoma; bilateral. Stable and benign on CT     Hypertension, essential 11/11/2019    Hypothyroidism, postoperative     Morbid obesity ([address]     S/P partial thyroidectomy 11/11/2019       Past Surgical History:   Procedure Laterality Date    BACK SURGERY  2009    post aa    CARPAL TUNNEL RELEASE Right     DILATION AND CURETTAGE OF UTERUS      HYSTERECTOMY (CERVIX STATUS UNKNOWN)      KNEE ARTHROSCOPY Right 11/05/2015    Medial Menesectomy with Chrondroplasty - Dr Esha Cotton N/A 3/9/2018    DIAGNOSTIC LAPAROSCOPY, APPENDECTOMY, infarcted appendix plancha performed by Mercedez Sanchez MD at [address] [place] [address] (CERVIX REMOVED)  age 19    endometriosis.      THYROID LOBECTOMY Right 10/2019    [facility] [place]    TONSILLECTOMY         No Known Allergies    Social History     Tobacco Use    Smoking status: Never Smoker    Smokeless tobacco: Never Used   Substance Use Topics    Alcohol use: No       Family History   Problem Relation Age of Onset    Colon Cancer Paternal Grandmother 80    Breast Cancer Paternal Grandmother 52    Breast Cancer Maternal Grandmother 39    High Blood Pressure Mother     Diabetes Father     Lung Cancer Maternal Uncle 72    Breast Cancer Paternal Aunt 40    Cancer Paternal Uncle         pt not sure of orgin of cancer    Cancer Maternal Uncle         pt unsure of orgin of cancer    Ovarian Cancer Neg Hx     Tuberculosis Neg Hx          I have reviewed the patient's past medical history, past surgical history, allergies, medications, social and family history and I have made updates where appropriate. Review of Systems  Positive responses are highlighted in bold    Constitutional:  Fever, Chills, Night Sweats, Fatigue, Unexpected changes in weight  HENT:  Ear pain, Tinnitus, Nosebleeds, Trouble swallowing, Hearing loss, Sore throat  Cardiovascular:  Chest Pain, Palpitations, Orthopnea, Paroxysmal Nocturnal Dyspnea  Respiratory:  Cough, Wheezing, Shortness of breath, Chest tightness, Apnea  Gastrointestinal:  Nausea, Vomiting, Diarrhea, Constipation, Heartburn, Blood in stool  Genitourinary:  Difficulty or painful urination, Flank pain, Change in frequency, Urgency  Skin:  Color change, Rash, Itching, Wound  Musculoskeletal:  Joint pain, Back pain, Gait problems, Joint swelling, Myalgias  Neurological:  Dizziness, Headaches, Presyncope, Numbness, Seizures, Tremors  Endocrine:  Heat Intolerance, Cold Intolerance, Polydipsia, Polyphagia, Polyuria      PHYSICAL EXAM:  Vitals:    06/28/22 0746   BP: 120/68   Pulse: 88   Resp: 12   Temp: 97.7 °F (36.5 °C)   TempSrc: Oral   SpO2: 100%   Weight: 277 lb (125.6 kg)   Height: 5' 9.5\" (1.765 m)     Body mass index is 40.32 kg/m².        VS Reviewed  General Appearance: A&O x 3, No acute distress,well developed and well- nourished  Eyes: pupils equal, round, and reactive to light, extraocular eye movements intact, conjunctivae and eye lids without erythema  ENT: external ear and ear canal clear bilaterally, TMs intact and regular, nose without deformity, nasal mucosa and turbinates normal without polyps, oropharynx normal, dentition is normal for age  Neck: supple and non-tender without mass, no thyromegaly or thyroid nodules, no cervical lymphadenopathy  Pulmonary/Chest: clear to auscultation bilaterally- no wheezes, rales or rhonchi, normal air movement, no respiratory distress or retractions  Cardiovascular: S1 and S2 auscultated w/ RRR. No murmurs, rubs, clicks, or gallops, distal pulses intact. Abdomen: soft, non-tender, non-distended, bowel sounds physiologic,  no rebound or guarding, no masses or hernias noted. Liver and spleen without enlargement. Extremities: no cyanosis, clubbing or edema of the lower extremities. Skin: warm and dry, no rash or erythema      ASSESSMENT & PLAN  1. Morbid obesity with BMI of 40.0-44.9, adult ([facility] [address]    Will resume adipex x 3 mnths  Wt check 4 wks  Improve diet/exercise  4 wk wt check, nurse visit  3 month OV    OAARS reviewed and appropriate. Controlled Substances Monitoring:     Periodic Controlled Substance Monitoring: Possible medication side effects, risk of tolerance/dependence & alternative treatments discussed. ,No signs of potential drug abuse or diversion identified. Kishore Bergman DO)    - phentermine (ADIPEX-P) 37.5 MG tablet; Take 1 tablet by mouth every morning (before breakfast) for 30 days. Dispense: 30 tablet; Refill: 0    2. Hypertension, essential    Stable  con't Micardis  Labs ordered    - CBC with Auto Differential; Future  - Comprehensive Metabolic Panel; Future  - Lipid Panel; Future  - Microalbumin / Creatinine Urine Ratio; Future  - TSH with Reflex; Future    3. Hypothyroidism, postoperative    Stable  con't synthroid  Labs ordered    - TSH with Reflex; Future    4. S/P partial thyroidectomy    - TSH with Reflex; Future    5. Chronic right-sided low back pain with right-sided sciatica    Stable  con't zanaflex, mobic, HEP and wt loss  If sxs worse, refer to pain management    6.  Degenerative disc disease, lumbar      7. Encounter for screening mammogram for malignant neoplasm of breast    - [facility] DIGITAL SCREEN BILATERAL; Future      DISPOSITION    Return in about 3 months (around 9/28/2022) for f/u wt loss and adipex, sooner as needed. [facility] released without restrictions. PATIENT COUNSELING    Barriers to learning and self management: none    Discussed use, benefit, and side effects of prescribed medications. Barriers to medication compliance addressed. All patient questions answered. Pt voiced understanding.        Electronically signed by Sophie Wong DO on 6/28/2022 at 7:59 AM

## 2022-06-28 ENCOUNTER — TELEPHONE (OUTPATIENT)
Dept: FAMILY MEDICINE CLINIC | Age: 42
End: 2022-06-28

## 2022-06-28 ENCOUNTER — OFFICE VISIT (OUTPATIENT)
Dept: FAMILY MEDICINE CLINIC | Age: 42
End: 2022-06-28
Payer: COMMERCIAL

## 2022-06-28 VITALS
HEIGHT: 70 IN | DIASTOLIC BLOOD PRESSURE: 68 MMHG | WEIGHT: 277 LBS | BODY MASS INDEX: 39.65 KG/M2 | TEMPERATURE: 97.7 F | RESPIRATION RATE: 12 BRPM | OXYGEN SATURATION: 100 % | SYSTOLIC BLOOD PRESSURE: 120 MMHG | HEART RATE: 88 BPM

## 2022-06-28 DIAGNOSIS — Z12.31 ENCOUNTER FOR SCREENING MAMMOGRAM FOR MALIGNANT NEOPLASM OF BREAST: ICD-10-CM

## 2022-06-28 DIAGNOSIS — I10 HYPERTENSION, ESSENTIAL: ICD-10-CM

## 2022-06-28 DIAGNOSIS — M54.41 CHRONIC RIGHT-SIDED LOW BACK PAIN WITH RIGHT-SIDED SCIATICA: ICD-10-CM

## 2022-06-28 DIAGNOSIS — M51.36 DEGENERATIVE DISC DISEASE, LUMBAR: ICD-10-CM

## 2022-06-28 DIAGNOSIS — E89.0 POSTOPERATIVE HYPOTHYROIDISM: ICD-10-CM

## 2022-06-28 DIAGNOSIS — E89.0 S/P PARTIAL THYROIDECTOMY: ICD-10-CM

## 2022-06-28 DIAGNOSIS — E66.01 MORBID OBESITY WITH BMI OF 40.0-44.9, ADULT (HCC): Primary | ICD-10-CM

## 2022-06-28 DIAGNOSIS — G89.29 CHRONIC RIGHT-SIDED LOW BACK PAIN WITH RIGHT-SIDED SCIATICA: ICD-10-CM

## 2022-06-28 PROCEDURE — 99214 OFFICE O/P EST MOD 30 MIN: CPT | Performed by: FAMILY MEDICINE

## 2022-06-28 RX ORDER — PHENTERMINE HYDROCHLORIDE 37.5 MG/1
37.5 TABLET ORAL
Qty: 30 TABLET | Refills: 0 | Status: SHIPPED | OUTPATIENT
Start: 2022-06-28 | End: 2022-07-26 | Stop reason: SDUPTHER

## 2022-06-28 ASSESSMENT — PATIENT HEALTH QUESTIONNAIRE - PHQ9
SUM OF ALL RESPONSES TO PHQ QUESTIONS 1-9: 0
1. LITTLE INTEREST OR PLEASURE IN DOING THINGS: 0
SUM OF ALL RESPONSES TO PHQ9 QUESTIONS 1 & 2: 0
2. FEELING DOWN, DEPRESSED OR HOPELESS: 0
SUM OF ALL RESPONSES TO PHQ QUESTIONS 1-9: 0

## 2022-06-28 NOTE — TELEPHONE ENCOUNTER
Mammogram 07/12/22 @ [facility]   Arrive @ 9:30 am to [place] [address] [address]   Wear mask bring ID and Insurance cards     No Powders ,lotins creams or perfumes until after test     To reschedule call [phone] option # 1

## 2022-06-28 NOTE — PATIENT INSTRUCTIONS
LAB INSTRUCTIONS:    Please complete labs within 4 week(s). Please fast for 8 hours prior to lab collection. The clinic will call you within 1 week of collection. If you have not heard from us within that amount of time, please call us at [phone].

## 2022-07-12 ENCOUNTER — HOSPITAL ENCOUNTER (OUTPATIENT)
Dept: WOMENS IMAGING | Age: 42
Discharge: HOME | End: 2022-07-12
Payer: COMMERCIAL

## 2022-07-12 DIAGNOSIS — Z12.31 ENCOUNTER FOR SCREENING MAMMOGRAM FOR MALIGNANT NEOPLASM OF BREAST: ICD-10-CM

## 2022-07-12 PROCEDURE — 77063 BREAST TOMOSYNTHESIS BI: CPT

## 2022-07-13 ENCOUNTER — TELEPHONE (OUTPATIENT)
Dept: FAMILY MEDICINE CLINIC | Age: 42
End: 2022-07-13

## 2022-07-13 DIAGNOSIS — Z80.3 FAMILY HISTORY OF BREAST CANCER: ICD-10-CM

## 2022-07-13 DIAGNOSIS — Z12.39 BREAST CANCER SCREENING, HIGH RISK PATIENT: Primary | ICD-10-CM

## 2022-07-13 NOTE — TELEPHONE ENCOUNTER
----- Message from Geraldo Ratliff DO sent at 7/13/2022  6:48 AM EDT -----  Please let pt know that mammogram is normal  Because of her family hx, she also is a candidate for breast MRI in addition to standard mammogram for breast cancer screening, per current guidelines. Would she like to pursue this? Let me know. , thanks!

## 2022-07-13 NOTE — TELEPHONE ENCOUNTER
Diagnosis Orders   1. Breast cancer screening, high risk patient  MRI BREAST BILATERAL W WO CONTRAST   2.  Family history of breast cancer  MRI BREAST BILATERAL W WO CONTRAST

## 2022-07-26 ENCOUNTER — TELEPHONE (OUTPATIENT)
Dept: FAMILY MEDICINE CLINIC | Age: 42
End: 2022-07-26

## 2022-07-26 ENCOUNTER — NURSE ONLY (OUTPATIENT)
Dept: FAMILY MEDICINE CLINIC | Age: 42
End: 2022-07-26

## 2022-07-26 VITALS — WEIGHT: 272.6 LBS | BODY MASS INDEX: 39.68 KG/M2

## 2022-07-26 DIAGNOSIS — E66.01 MORBID OBESITY WITH BMI OF 40.0-44.9, ADULT (HCC): Primary | ICD-10-CM

## 2022-07-26 RX ORDER — PHENTERMINE HYDROCHLORIDE 37.5 MG/1
37.5 TABLET ORAL
Qty: 30 TABLET | Refills: 0 | Status: SHIPPED | OUTPATIENT
Start: 2022-07-26 | End: 2022-08-31 | Stop reason: SDUPTHER

## 2022-07-26 NOTE — TELEPHONE ENCOUNTER
ASSESSMENT & PLAN   Diagnosis Orders   1. Morbid obesity with BMI of 40.0-44.9, adult (HCC)  phentermine (ADIPEX-P) 37.5 MG tablet          OAARS reviewed and appropriate. Controlled Substances Monitoring: Periodic Controlled Substance Monitoring: Possible medication side effects, risk of tolerance/dependence & alternative treatments discussed., No signs of potential drug abuse or diversion identified.  Bret Valdez, DO)      Future Appointments   Date Time Provider [facility]   9/27/2022  8:00 AM Bret Valdez, [address] [place] [address]

## 2022-07-26 NOTE — PROGRESS NOTES
Pt came in for her 1st weight checking being on adipex.    Wt Readings from Last 3 Encounters:   07/26/22 272 lb 9.6 oz (123.7 kg)   06/28/22 277 lb (125.6 kg)   12/20/21 268 lb (121.6 kg)

## 2022-07-26 NOTE — TELEPHONE ENCOUNTER
Pt came in for her 1st weight checking being on adipex.      Wt Readings from Last 3 Encounters:   07/26/22 272 lb 9.6 oz (123.7 kg)   06/28/22 277 lb (125.6 kg)   12/20/21 268 lb (121.6 kg)           Recent Visits  Date Type Provider Dept   06/28/22 Office Visit Caitlyn Salinas, DO [facility]   12/20/21 Office Visit Caitlyn Salinas, DO [facility]   12/09/21 Office Visit Caitlyn Salinas, DO [facility]   09/20/21 Office Visit Caitlyn Salinas, DO [facility]   08/19/21 Office Visit Caitlyn Salinas, DO [facility]   03/02/21 Office Visit Caitlyn Salinas, [address] [place] [address] recent visits within past 540 days with a meds authorizing provider and meeting all other requirements  Future Appointments  Date Type Provider Dept   09/27/22 Appointment Caitlyn Salinas, [facility]   Showing future appointments within next 150 days with a meds authorizing provider and meeting all other requirements  Future Appointments   Date Time Provider Junior Kim   9/27/2022  8:00 AM [id number] [facility]

## 2022-08-08 ENCOUNTER — TELEPHONE (OUTPATIENT)
Dept: FAMILY MEDICINE CLINIC | Age: 42
End: 2022-08-08

## 2022-08-08 NOTE — TELEPHONE ENCOUNTER
MRI Breast   Monday 8/29/22  Arrive at 5:30PM   Main radiology     *Do not wear any jewelry or clothing with metal to the appointment.   * Any pumps or continuous glucose monitors will need removed prior to MRI scanning      Pt informed and understanding with no further questions

## 2022-08-29 ENCOUNTER — HOSPITAL ENCOUNTER (OUTPATIENT)
Dept: MRI IMAGING | Age: 42
Discharge: HOME | End: 2022-08-29
Payer: COMMERCIAL

## 2022-08-29 DIAGNOSIS — Z12.39 BREAST CANCER SCREENING, HIGH RISK PATIENT: ICD-10-CM

## 2022-08-29 DIAGNOSIS — Z80.3 FAMILY HISTORY OF BREAST CANCER: ICD-10-CM

## 2022-08-29 PROCEDURE — C8908 MRI W/O FOL W/CONT, BREAST,: HCPCS

## 2022-08-29 PROCEDURE — 6360000004 HC RX CONTRAST MEDICATION: Performed by: FAMILY MEDICINE

## 2022-08-29 PROCEDURE — A9579 GAD-BASE MR CONTRAST NOS,1ML: HCPCS | Performed by: FAMILY MEDICINE

## 2022-08-29 RX ADMIN — GADOTERIDOL 20 ML: 279.3 INJECTION, SOLUTION INTRAVENOUS at 19:00

## 2022-08-30 ENCOUNTER — PATIENT MESSAGE (OUTPATIENT)
Dept: FAMILY MEDICINE CLINIC | Age: 42
End: 2022-08-30

## 2022-08-30 ENCOUNTER — TELEPHONE (OUTPATIENT)
Dept: FAMILY MEDICINE CLINIC | Age: 42
End: 2022-08-30

## 2022-08-30 NOTE — TELEPHONE ENCOUNTER
----- Message from Deb Moya DO sent at 8/30/2022 12:26 PM EDT -----  Please let pt know that bilat breast MRI is normal  Repeat 1 year per current guidelines. Let me know if questions, thanks!

## 2022-08-31 ENCOUNTER — TELEPHONE (OUTPATIENT)
Dept: FAMILY MEDICINE CLINIC | Age: 42
End: 2022-08-31

## 2022-08-31 ENCOUNTER — NURSE ONLY (OUTPATIENT)
Dept: FAMILY MEDICINE CLINIC | Age: 42
End: 2022-08-31

## 2022-08-31 VITALS — BODY MASS INDEX: 38.17 KG/M2 | WEIGHT: 262.2 LBS

## 2022-08-31 DIAGNOSIS — E66.01 MORBID OBESITY (HCC): Primary | ICD-10-CM

## 2022-08-31 DIAGNOSIS — E66.01 MORBID OBESITY WITH BMI OF 40.0-44.9, ADULT (HCC): Primary | ICD-10-CM

## 2022-08-31 RX ORDER — PHENTERMINE HYDROCHLORIDE 37.5 MG/1
37.5 TABLET ORAL
Qty: 30 TABLET | Refills: 0 | Status: SHIPPED | OUTPATIENT
Start: 2022-08-31 | End: 2022-09-30

## 2022-08-31 NOTE — PROGRESS NOTES
Pt presented to the office for weight check for adipex  Pt competed her 2nd month of adipex  Weight today 262.2 lbs  Wt Readings from Last 3 Encounters:   07/26/22 272 lb 9.6 oz (123.7 kg)   06/28/22 277 lb (125.6 kg)   12/20/21 268 lb (121.6 kg)

## 2022-08-31 NOTE — TELEPHONE ENCOUNTER
From: Anel Lopez  To: Dr. Ervin Duane: 8/30/2022 11:03 PM EDT  Subject: Phentermine nurse appointment    I forgot to schedule a nurse appointment when I came in last month. Do you have any availability for this week after 2:30pm? This would just be another weigh in and request for medication to be refilled.     Thank you,  Betty Whelan

## 2022-09-27 NOTE — PROGRESS NOTES
Chief Complaint   Patient presents with    Obesity    Mole     ? Mole on back     History obtained from the patient. SUBJECTIVE:  Durga Harris is a 38 y.o.  that presents today for       -Obesity PRIOR VISIT:  Wanted to discuss wts again today  Trouble losing wt  Has seen dietician, and doing great with diet  Is active and exercises regularly    UPDATE PRIOR VISIT:   Here for f/u   BP's better  Doing exercise, calorie restriction  Would like to try adipex    UPDATE PRIOR VISIT:   Has completed adipex x 3 months  Started at 290+  Good life style changes  wts down to 268lb     UPDATE LAST VISIT:   Wt up to 277  Below 290+ where she was before  Working on lifestyle changes, had slacked off  Completed adipex 6+ months ago  Would like to try agan    UPDATE TODAY:   Finishing adipex  Wts down 20lbs  Con[place] to work [place] [employer] [employer] [place]. Both good     Wt Readings from Last 3 Encounters:   09/28/22 254 lb 3.2 oz (115.3 kg)   08/31/22 262 lb 3.2 oz (118.9 kg)   07/26/22 272 lb 9.6 oz (123.7 kg)       -? Mole:  ? Mole on back  Irritating  Would like looked at  Been there a while. Age/Gender Health Maintenance    Lipid -   Lab Results   Component Value Date    CHOL 189 09/07/2021    CHOL 170 10/21/2020    CHOL 200 (H) 11/06/2019     Lab Results   Component Value Date    TRIG 117 09/07/2021    TRIG 97 10/21/2020    TRIG 122 11/06/2019     Lab Results   Component Value Date    HDL 45 09/07/2021    HDL 45 10/21/2020    HDL 48 11/06/2019     Lab Results   Component Value Date    LDLCALC 121 09/07/2021    [address] 106 10/21/2020    [address] 128 11/06/2019     No results found for: LABVLDL, VLDL  No results found for: CHOLHDLRATIO      DM Screen -   Lab Results   Component Value Date/Time    GLUCOSE 94 09/07/2021 12:00 PM     TSH -   Lab Results   Component Value Date    TSH 2.130 09/07/2021       Colon Cancer Screening - age 37  Lung Cancer Screening - never smoker.      Tetanus - UTD MAY 2018  Influenza Vaccine - UTD FALL 2022  Pneumonia Vaccine - age 70  Zoster - age 46     Breast Cancer Screening - NEG TERESO July 2022 and NEG MRI AUG 2022  Cervical Cancer Screening - hyst for benign reasons  Osteoporosis Screening - age 59      Current Outpatient Medications   Medication Sig Dispense Refill    phentermine (ADIPEX-P) 37.5 MG tablet Take 1 tablet by mouth every morning (before breakfast) for 30 days. 30 tablet 0    meloxicam (MOBIC) 15 MG tablet Take 1 tablet by mouth daily as needed for Pain 30 tablet 5    levothyroxine (SYNTHROID) 25 MCG tablet Take 1 tablet by mouth daily 90 tablet 3    acetaminophen (TYLENOL) 325 MG suppository Place 325 mg rectally every 4 hours as needed for Fever      telmisartan (MICARDIS) 40 MG tablet Take 1 tablet by mouth daily 90 tablet 3    tiZANidine (ZANAFLEX) 4 MG tablet Take 1 tablet by mouth every 8 hours as needed (for back pain/spasm) 90 tablet 0     No current facility-administered medications for this visit. No orders of the defined types were placed in this encounter. All medications reviewed and reconciled, including OTC and herbal medications. Updated list given to patient. Patient Active Problem List   Diagnosis    Patellofemoral stress syndrome of right knee    Right knee meniscal tear    Osteochondral defect of patella, right    Obesity (BMI 30-39. 9)    Multiple thyroid nodules    Adrenal adenoma; bilateral. Stable and benign on CT    Hypothyroidism, postoperative    Hypertension, essential    S/P partial thyroidectomy    Chronic right-sided low back pain with right-sided sciatica    Degenerative disc disease, lumbar       Past Medical History:   Diagnosis Date    Adrenal adenoma; bilateral. Stable and benign on CT     Hypertension, essential 11/11/2019    Hypothyroidism, postoperative     Obesity (BMI 30-39. 9)     S/P partial thyroidectomy 11/11/2019       Past Surgical History:   Procedure Laterality Date    BACK SURGERY  2009    post aa    CARPAL TUNNEL RELEASE Right DILATION AND CURETTAGE OF UTERUS      HYSTERECTOMY (CERVIX STATUS UNKNOWN)      KNEE ARTHROSCOPY Right 11/05/2015    Medial Menesectomy with Chrondroplasty - Dr Davide Williamson N/A 3/9/2018    DIAGNOSTIC LAPAROSCOPY, APPENDECTOMY, infarcted appendix plancha performed by Maris Lockhart MD at [address] [facility] [address] (CERVIX REMOVED)  age 19    endometriosis. THYROID LOBECTOMY Right 10/2019    [facility] [place]    TONSILLECTOMY         No Known Allergies    Social History     Tobacco Use    Smoking status: Never    Smokeless tobacco: Never   Substance Use Topics    Alcohol use: No       Family History   Problem Relation Age of Onset    Colon Cancer Paternal Grandmother 80    Breast Cancer Paternal Grandmother 52    Breast Cancer Maternal Grandmother 39    High Blood Pressure Mother     Diabetes Father     Prostate Cancer Father     Lung Cancer Maternal Uncle 72    Breast Cancer Paternal Aunt 40    Cancer Paternal Uncle         pt not sure of orgin of cancer    Cancer Maternal Uncle         pt unsure of orgin of cancer    Breast Cancer Sister 39    Ovarian Cancer Neg Hx     Tuberculosis Neg Hx          I have reviewed the patient's past medical history, past surgical history, allergies, medications, social and family history and I have made updates where appropriate.       Review of Systems  Positive responses are highlighted in bold    Constitutional:  Fever, Chills, Night Sweats, Fatigue, Unexpected changes in weight  HENT:  Ear pain, Tinnitus, Nosebleeds, Trouble swallowing, Hearing loss, Sore throat  Cardiovascular:  Chest Pain, Palpitations, Orthopnea, Paroxysmal Nocturnal Dyspnea  Respiratory:  Cough, Wheezing, Shortness of breath, Chest tightness, Apnea  Gastrointestinal:  Nausea, Vomiting, Diarrhea, Constipation, Heartburn, Blood in stool  Genitourinary:  Difficulty or painful urination, Flank pain, Change in frequency, Urgency  Skin:  Color change, Rash, Itching, Wound  Musculoskeletal: Joint pain, Back pain, Gait problems, Joint swelling, Myalgias  Neurological:  Dizziness, Headaches, Presyncope, Numbness, Seizures, Tremors  Endocrine:  Heat Intolerance, Cold Intolerance, Polydipsia, Polyphagia, Polyuria      PHYSICAL EXAM:  Vitals:    09/28/22 0748   BP: 130/80   Cuff Size: Large Adult   Pulse: 76   Resp: 16   Temp: 97.5 °F (36.4 °C)   SpO2: 99%   Weight: 254 lb 3.2 oz (115.3 kg)   Height: 5' 9.69\" (1.77 m)     Body mass index is 36.8 kg/m². VS Reviewed  General Appearance: A&O x 3, No acute distress,well developed and well- nourished  Eyes: pupils equal, round, and reactive to light, extraocular eye movements intact, conjunctivae and eye lids without erythema  ENT: external ear and ear canal clear bilaterally, TMs intact and regular, nose without deformity, nasal mucosa and turbinates normal without polyps, oropharynx normal, dentition is normal for age  Neck: supple and non-tender without mass, no thyromegaly or thyroid nodules, no cervical lymphadenopathy  Pulmonary/Chest: clear to auscultation bilaterally- no wheezes, rales or rhonchi, normal air movement, no respiratory distress or retractions  Cardiovascular: S1 and S2 auscultated w/ RRR. No murmurs, rubs, clicks, or gallops, distal pulses intact. Abdomen: soft, non-tender, non-distended, bowel sounds physiologic,  no rebound or guarding, no masses or hernias noted. Liver and spleen without enlargement. Extremities: no cyanosis, clubbing or edema of the lower extremities. Skin: warm and dry, no rash or erythema, 1.0 cm x 1.0 skin tag on L mid back. ASSESSMENT & PLAN  1. Obesity (BMI 30-39. 9)    Completed adipex  Con't lifestyle changes  F/u 6 months    2. Skin tag    Cryo x 3  F/u 3-4 wks if not resolved    Skin Cryotherapy Note    Verbal consent was obtain from patient or their guargian prior to procedure.  After obtaining informed consent, the patient's identity, procedure, and site were verified during a pause prior to proceeding with the minor surgical procedure as per universal protocol recommendations. Wart was treated with light cryotherapy with freeze thaw freeze technique with 2-3 mm surround freeze of overlying keratin. Local care discussed. Side effects of treatment and precautions discussed. All questions answered. To follow up if worse or any new problems. - 66007 - ID DESTRUCTION BENIGN LESIONS UP TO 14    3. Need for influenza vaccination    - Influenza, FLUCELVAX, (age 10 mo+), IM, Preservative Free, 0.5 mL      DISPOSITION    Return in about 6 months (around 4/6/2023) for follow-up on chronic medical conditions, sooner as needed. Suellen Corporal released without restrictions. PATIENT COUNSELING    Barriers to learning and self management: none    Discussed use, benefit, and side effects of prescribed medications. Barriers to medication compliance addressed. All patient questions answered. Pt voiced understanding.        Electronically signed by Jennifer Tony DO on 9/28/2022 at 8:10 AM

## 2022-09-27 NOTE — PATIENT INSTRUCTIONS
LAB INSTRUCTIONS:    Please complete labs within 4 week(s). Please fast for 8 hours prior to lab collection. The clinic will call you within 1 week of collection. If you have not heard from us within that amount of time, please call us at [phone].

## 2022-09-28 ENCOUNTER — OFFICE VISIT (OUTPATIENT)
Dept: FAMILY MEDICINE CLINIC | Age: 42
End: 2022-09-28
Payer: COMMERCIAL

## 2022-09-28 VITALS
WEIGHT: 254.2 LBS | TEMPERATURE: 97.5 F | BODY MASS INDEX: 36.39 KG/M2 | DIASTOLIC BLOOD PRESSURE: 80 MMHG | HEIGHT: 70 IN | RESPIRATION RATE: 16 BRPM | OXYGEN SATURATION: 99 % | SYSTOLIC BLOOD PRESSURE: 130 MMHG | HEART RATE: 76 BPM

## 2022-09-28 DIAGNOSIS — L91.8 SKIN TAG: ICD-10-CM

## 2022-09-28 DIAGNOSIS — Z23 NEED FOR INFLUENZA VACCINATION: ICD-10-CM

## 2022-09-28 DIAGNOSIS — E66.9 OBESITY (BMI 30-39.9): Primary | Chronic | ICD-10-CM

## 2022-09-28 PROCEDURE — 11200 RMVL SKIN TAGS UP TO&INC 15: CPT | Performed by: FAMILY MEDICINE

## 2022-09-28 PROCEDURE — 90471 IMMUNIZATION ADMIN: CPT | Performed by: FAMILY MEDICINE

## 2022-09-28 PROCEDURE — 99213 OFFICE O/P EST LOW 20 MIN: CPT | Performed by: FAMILY MEDICINE

## 2022-09-28 PROCEDURE — 90674 CCIIV4 VAC NO PRSV 0.5 ML IM: CPT | Performed by: FAMILY MEDICINE

## 2022-09-28 SDOH — ECONOMIC STABILITY: FOOD INSECURITY: WITHIN THE PAST 12 MONTHS, THE FOOD YOU BOUGHT JUST DIDN'T LAST AND YOU DIDN'T HAVE MONEY TO GET MORE.: NEVER TRUE

## 2022-09-28 SDOH — ECONOMIC STABILITY: FOOD INSECURITY: WITHIN THE PAST 12 MONTHS, YOU WORRIED THAT YOUR FOOD WOULD RUN OUT BEFORE YOU GOT MONEY TO BUY MORE.: NEVER TRUE

## 2022-09-28 ASSESSMENT — SOCIAL DETERMINANTS OF HEALTH (SDOH): HOW HARD IS IT FOR YOU TO PAY FOR THE VERY BASICS LIKE FOOD, HOUSING, MEDICAL CARE, AND HEATING?: NOT HARD AT ALL

## 2022-09-28 NOTE — PROGRESS NOTES
Vaccine Information Sheet, \"Influenza - Inactivated\"  given to Satish Silva, or parent/legal guardian of  Satish Silva and verbalized understanding. Patient responses:    Have you ever had a reaction to a flu vaccine? No  Do you have an allergy to eggs, neomycin or polymixin? No  Do you have an allergy to Thimerosal, contact lens solution, or Merthiolate? No  Have you ever had Guillian Cheney Syndrome? No  Do you have any current illness? No  Do you have a temperature above 100 degrees? No  Are you pregnant? No  If pregnant, permission obtained from physician? No  Do you have an active neurological disorder? No      Flu vaccine given per order. Please see immunization tab.

## 2022-10-24 DIAGNOSIS — M54.41 CHRONIC RIGHT-SIDED LOW BACK PAIN WITH RIGHT-SIDED SCIATICA: ICD-10-CM

## 2022-10-24 DIAGNOSIS — M51.36 DEGENERATIVE DISC DISEASE, LUMBAR: ICD-10-CM

## 2022-10-24 DIAGNOSIS — G89.29 CHRONIC RIGHT-SIDED LOW BACK PAIN WITH RIGHT-SIDED SCIATICA: ICD-10-CM

## 2022-10-24 RX ORDER — MELOXICAM 15 MG/1
15 TABLET ORAL DAILY PRN
Qty: 30 TABLET | Refills: 5 | Status: SHIPPED | OUTPATIENT
Start: 2022-10-24

## 2022-10-24 NOTE — TELEPHONE ENCOUNTER
Recent Visits  Date Type Provider Dept   09/28/22 Office Visit Nicole Alvarado, DO [facility]   06/28/22 Office Visit Nicole Alvarado, DO [facility]   12/20/21 Office Visit Nicole Alvarado, DO [facility]   12/09/21 Office Visit Nicole Alvarado, DO [facility]   09/20/21 Office Visit Nicole Alvarado, DO [facility]   08/19/21 Office Visit Nicole Alvarado, DO [facility]   Showing recent visits within past 540 days with a meds authorizing provider and meeting all other requirements  Future Appointments  No visits were found meeting these conditions.   Showing future appointments within next 150 days with a meds authorizing provider and meeting all other requirements   Future Appointments   Date Time Provider Junior Kim   4/6/2023  7:40 AM [id number] [facility]

## 2023-04-05 NOTE — PROGRESS NOTES
with right-sided sciatica    Stable  con't zanaflex, mobic, HEP and wt loss  If sxs worse, refer to pain management  Ok to update FMLA    6. Degenerative disc disease, lumbar    As above    7. Skin tag    Cryo x 3  F/u 3-4 wks if not resolved    Skin Cryotherapy Note    Verbal consent was obtain from patient or their guargian prior to procedure. After obtaining informed consent, the patient's identity, procedure, and site were verified during a pause prior to proceeding with the minor surgical procedure as per universal protocol recommendations. Wart was treated with light cryotherapy with freeze thaw freeze technique with 2-3 mm surround freeze of overlying keratin. Local care discussed. Side effects of treatment and precautions discussed. All questions answered. To follow up if worse or any new problems. - 70367 - NM DESTRUCTION BENIGN LESIONS UP TO 14      DISPOSITION    Return in about 3 months (around 7/6/2023) for f/u obesity/adipex. [address] released without restrictions. Future Appointments   Date Time Provider Junior Kim   5/4/2023  8:00 AM SCHEDULE, NURSE Dayne Rice [address] [email or web address] [place]   10/5/2023  8:00 AM Pelon Fan DO [facility] [address]     PATIENT COUNSELING    Barriers to learning and self management: none    Discussed use, benefit, and side effects of prescribed medications. Barriers to medication compliance addressed. All patient questions answered. Pt voiced understanding.        Electronically signed by Pelon Fan DO on 4/6/2023 at 8:16 AM

## 2023-04-06 ENCOUNTER — OFFICE VISIT (OUTPATIENT)
Dept: FAMILY MEDICINE CLINIC | Age: 43
End: 2023-04-06

## 2023-04-06 ENCOUNTER — HOSPITAL ENCOUNTER (OUTPATIENT)
Age: 43
Discharge: HOME | End: 2023-04-06
Payer: COMMERCIAL

## 2023-04-06 VITALS
RESPIRATION RATE: 16 BRPM | HEART RATE: 81 BPM | BODY MASS INDEX: 40.94 KG/M2 | HEIGHT: 70 IN | OXYGEN SATURATION: 96 % | DIASTOLIC BLOOD PRESSURE: 84 MMHG | SYSTOLIC BLOOD PRESSURE: 138 MMHG | WEIGHT: 286 LBS | TEMPERATURE: 97.9 F

## 2023-04-06 DIAGNOSIS — E89.0 POSTOPERATIVE HYPOTHYROIDISM: Chronic | ICD-10-CM

## 2023-04-06 DIAGNOSIS — I10 HYPERTENSION, ESSENTIAL: Chronic | ICD-10-CM

## 2023-04-06 DIAGNOSIS — E66.01 MORBID OBESITY WITH BMI OF 40.0-44.9, ADULT (HCC): Primary | ICD-10-CM

## 2023-04-06 DIAGNOSIS — M51.36 DEGENERATIVE DISC DISEASE, LUMBAR: ICD-10-CM

## 2023-04-06 DIAGNOSIS — L91.8 SKIN TAG: ICD-10-CM

## 2023-04-06 DIAGNOSIS — E66.01 MORBID OBESITY WITH BMI OF 40.0-44.9, ADULT (HCC): ICD-10-CM

## 2023-04-06 DIAGNOSIS — M54.41 CHRONIC RIGHT-SIDED LOW BACK PAIN WITH RIGHT-SIDED SCIATICA: ICD-10-CM

## 2023-04-06 DIAGNOSIS — E89.0 S/P PARTIAL THYROIDECTOMY: Chronic | ICD-10-CM

## 2023-04-06 DIAGNOSIS — G89.29 CHRONIC RIGHT-SIDED LOW BACK PAIN WITH RIGHT-SIDED SCIATICA: ICD-10-CM

## 2023-04-06 LAB
ALBUMIN SERPL BCG-MCNC: 4.5 G/DL (ref 3.5–5.1)
ALP SERPL-CCNC: 95 U/L (ref 38–126)
ALT SERPL W/O P-5'-P-CCNC: 18 U/L (ref 11–66)
ANION GAP SERPL CALC-SCNC: 14 MEQ/L (ref 8–16)
AST SERPL-CCNC: 19 U/L (ref 5–40)
BASOPHILS ABSOLUTE: 0.1 THOU/MM3 (ref 0–0.1)
BASOPHILS NFR BLD AUTO: 0.6 %
BILIRUB SERPL-MCNC: 0.2 MG/DL (ref 0.3–1.2)
BUN SERPL-MCNC: 16 MG/DL (ref 7–22)
CALCIUM SERPL-MCNC: 9.3 MG/DL (ref 8.5–10.5)
CHLORIDE SERPL-SCNC: 108 MEQ/L (ref 98–111)
CHOLEST SERPL-MCNC: 208 MG/DL (ref 100–199)
CO2 SERPL-SCNC: 23 MEQ/L (ref 23–33)
CREAT SERPL-MCNC: 0.8 MG/DL (ref 0.4–1.2)
CREAT UR-MCNC: 171.6 MG/DL
DEPRECATED MEAN GLUCOSE BLD GHB EST-ACNC: 117 MG/DL (ref 70–126)
DEPRECATED RDW RBC AUTO: 41.5 FL (ref 35–45)
EOSINOPHIL NFR BLD AUTO: 2.4 %
EOSINOPHILS ABSOLUTE: 0.2 THOU/MM3 (ref 0–0.4)
ERYTHROCYTE [DISTWIDTH] IN BLOOD BY AUTOMATED COUNT: 13.2 % (ref 11.5–14.5)
GFR SERPL CREATININE-BSD FRML MDRD: > 60 ML/MIN/1.73M2
GLUCOSE SERPL-MCNC: 87 MG/DL (ref 70–108)
HBA1C MFR BLD HPLC: 5.9 % (ref 4.4–6.4)
HCT VFR BLD AUTO: 44.9 % (ref 37–47)
HDLC SERPL-MCNC: 60 MG/DL
HGB BLD-MCNC: 14.3 GM/DL (ref 12–16)
IMM GRANULOCYTES # BLD AUTO: 0.05 THOU/MM3 (ref 0–0.07)
IMM GRANULOCYTES NFR BLD AUTO: 0.6 %
INSULIN SERPL-ACNC: 19.8 MU/L
LDLC SERPL CALC-MCNC: 122 MG/DL
LYMPHOCYTES ABSOLUTE: 2.4 THOU/MM3 (ref 1–4.8)
LYMPHOCYTES NFR BLD AUTO: 26.7 %
MCH RBC QN AUTO: 27.4 PG (ref 26–33)
MCHC RBC AUTO-ENTMCNC: 31.8 GM/DL (ref 32.2–35.5)
MCV RBC AUTO: 86 FL (ref 81–99)
MICROALBUMIN UR-MCNC: < 1.2 MG/DL
MICROALBUMIN/CREAT RATIO PNL UR: 7 MG/G (ref 0–30)
MONOCYTES ABSOLUTE: 0.5 THOU/MM3 (ref 0.4–1.3)
MONOCYTES NFR BLD AUTO: 5.6 %
NEUTROPHILS NFR BLD AUTO: 64.1 %
NRBC BLD AUTO-RTO: 0 /100 WBC
PLATELET # BLD AUTO: 194 THOU/MM3 (ref 130–400)
PMV BLD AUTO: 11.4 FL (ref 9.4–12.4)
POTASSIUM SERPL-SCNC: 4.4 MEQ/L (ref 3.5–5.2)
PROT SERPL-MCNC: 7.2 G/DL (ref 6.1–8)
RBC # BLD AUTO: 5.22 MILL/MM3 (ref 4.2–5.4)
SEGMENTED NEUTROPHILS ABSOLUTE COUNT: 5.7 THOU/MM3 (ref 1.8–7.7)
SODIUM SERPL-SCNC: 145 MEQ/L (ref 135–145)
TRIGL SERPL-MCNC: 130 MG/DL (ref 0–199)
TSH SERPL DL<=0.005 MIU/L-ACNC: 2.55 UIU/ML (ref 0.4–4.2)
WBC # BLD AUTO: 8.9 THOU/MM3 (ref 4.8–10.8)

## 2023-04-06 PROCEDURE — 36415 COLL VENOUS BLD VENIPUNCTURE: CPT

## 2023-04-06 PROCEDURE — 82043 UR ALBUMIN QUANTITATIVE: CPT

## 2023-04-06 PROCEDURE — 85025 COMPLETE CBC W/AUTO DIFF WBC: CPT

## 2023-04-06 PROCEDURE — 80061 LIPID PANEL: CPT

## 2023-04-06 PROCEDURE — 80053 COMPREHEN METABOLIC PANEL: CPT

## 2023-04-06 PROCEDURE — 84443 ASSAY THYROID STIM HORMONE: CPT

## 2023-04-06 PROCEDURE — 83525 ASSAY OF INSULIN: CPT

## 2023-04-06 PROCEDURE — 83036 HEMOGLOBIN GLYCOSYLATED A1C: CPT

## 2023-04-06 RX ORDER — PHENTERMINE HYDROCHLORIDE 37.5 MG/1
37.5 TABLET ORAL
Qty: 30 TABLET | Refills: 0 | Status: SHIPPED | OUTPATIENT
Start: 2023-04-06 | End: 2023-05-06

## 2023-04-06 SDOH — ECONOMIC STABILITY: INCOME INSECURITY: HOW HARD IS IT FOR YOU TO PAY FOR THE VERY BASICS LIKE FOOD, HOUSING, MEDICAL CARE, AND HEATING?: NOT HARD AT ALL

## 2023-04-06 SDOH — ECONOMIC STABILITY: HOUSING INSECURITY
IN THE LAST 12 MONTHS, WAS THERE A TIME WHEN YOU DID NOT HAVE A STEADY PLACE TO SLEEP OR SLEPT IN A SHELTER (INCLUDING NOW)?: NO

## 2023-04-06 SDOH — ECONOMIC STABILITY: FOOD INSECURITY: WITHIN THE PAST 12 MONTHS, YOU WORRIED THAT YOUR FOOD WOULD RUN OUT BEFORE YOU GOT MONEY TO BUY MORE.: NEVER TRUE

## 2023-04-06 SDOH — ECONOMIC STABILITY: FOOD INSECURITY: WITHIN THE PAST 12 MONTHS, THE FOOD YOU BOUGHT JUST DIDN'T LAST AND YOU DIDN'T HAVE MONEY TO GET MORE.: NEVER TRUE

## 2023-04-06 ASSESSMENT — PATIENT HEALTH QUESTIONNAIRE - PHQ9
SUM OF ALL RESPONSES TO PHQ QUESTIONS 1-9: 0
SUM OF ALL RESPONSES TO PHQ9 QUESTIONS 1 & 2: 0
2. FEELING DOWN, DEPRESSED OR HOPELESS: 0
SUM OF ALL RESPONSES TO PHQ QUESTIONS 1-9: 0
1. LITTLE INTEREST OR PLEASURE IN DOING THINGS: 0

## 2023-04-06 NOTE — PATIENT INSTRUCTIONS
LAB INSTRUCTIONS:    Please complete labs within 4 week(s). Please fast for 8 hours prior to lab collection. The clinic will call you within 1 week of collection. If you have not heard from us within that amount of time, please call us at [phone].

## 2023-04-07 ENCOUNTER — TELEPHONE (OUTPATIENT)
Dept: FAMILY MEDICINE CLINIC | Age: 43
End: 2023-04-07

## 2023-04-07 NOTE — TELEPHONE ENCOUNTER
----- Message from Elizabeth Juares DO sent at 4/7/2023  7:15 AM EDT -----  Please let pt know that labs overall look good. No evidence of diabetes  Insulin level is WNL as well  Con't with plan from office  Let me know if questions, thanks!

## 2023-05-02 ENCOUNTER — TELEPHONE (OUTPATIENT)
Dept: FAMILY MEDICINE CLINIC | Age: 43
End: 2023-05-02

## 2023-05-02 NOTE — TELEPHONE ENCOUNTER
LM with pt, our office form for FMLA was completed on 4/6/23 and the FMLA forms from her employer have not yet been received.

## 2023-05-04 ENCOUNTER — TELEPHONE (OUTPATIENT)
Dept: FAMILY MEDICINE CLINIC | Age: 43
End: 2023-05-04

## 2023-05-04 ENCOUNTER — NURSE ONLY (OUTPATIENT)
Dept: FAMILY MEDICINE CLINIC | Age: 43
End: 2023-05-04

## 2023-05-04 VITALS — WEIGHT: 277.2 LBS | BODY MASS INDEX: 39.77 KG/M2

## 2023-05-04 DIAGNOSIS — E66.01 MORBID OBESITY WITH BMI OF 40.0-44.9, ADULT (HCC): Primary | ICD-10-CM

## 2023-05-04 RX ORDER — PHENTERMINE HYDROCHLORIDE 37.5 MG/1
37.5 TABLET ORAL
Qty: 30 TABLET | Refills: 0 | Status: SHIPPED | OUTPATIENT
Start: 2023-05-04 | End: 2023-06-03

## 2023-05-04 NOTE — TELEPHONE ENCOUNTER
Pt came in for a weight check due to being on adipex. Wt Readings from Last 3 Encounters:   05/04/23 277 lb 3.2 oz (125.7 kg)   04/06/23 286 lb (129.7 kg)   09/28/22 254 lb 3.2 oz (115.3 kg)         Recent Visits  Date Type Provider Dept   04/06/23 Office Visit Andres Shearer, DO [facility]   09/28/22 Office Visit Andres Shearer, DO [facility]   06/28/22 Office Visit Andres Shearer, DO [facility]   12/20/21 Office Visit Andres Shearer, DO [facility]   12/09/21 Office Visit Andres Shearer, DO [facility]   Showing recent visits within past 540 days with a meds authorizing provider and meeting all other requirements  Future Appointments  No visits were found meeting these conditions.   Showing future appointments within next 150 days with a meds authorizing provider and meeting all other requirements      Future Appointments   Date Time Provider Junior Kim   10/5/2023  8:00 AM Andres Shearer, [address] [place] [address]

## 2023-05-04 NOTE — TELEPHONE ENCOUNTER
ASSESSMENT & PLAN   Diagnosis Orders   1. Morbid obesity with BMI of 40.0-44.9, adult (HCC)  phentermine (ADIPEX-P) 37.5 MG tablet          OAARS reviewed and appropriate. Controlled Substances Monitoring: Periodic Controlled Substance Monitoring: Possible medication side effects, risk of tolerance/dependence & alternative treatments discussed., No signs of potential drug abuse or diversion identified.  Maty Hartman DO)      Future Appointments   Date Time Provider [facility]   10/5/2023  8:00 AM Maty Hartman DO [facility] [address]     Wt Readings from Last 3 Encounters:   05/04/23 277 lb 3.2 oz (125.7 kg)   04/06/23 286 lb (129.7 kg)   09/28/22 254 lb 3.2 oz (115.3 kg)

## 2023-05-23 DIAGNOSIS — M54.41 CHRONIC RIGHT-SIDED LOW BACK PAIN WITH RIGHT-SIDED SCIATICA: ICD-10-CM

## 2023-05-23 DIAGNOSIS — M51.36 DEGENERATIVE DISC DISEASE, LUMBAR: ICD-10-CM

## 2023-05-23 DIAGNOSIS — G89.29 CHRONIC RIGHT-SIDED LOW BACK PAIN WITH RIGHT-SIDED SCIATICA: ICD-10-CM

## 2023-05-24 DIAGNOSIS — M51.36 DEGENERATIVE DISC DISEASE, LUMBAR: ICD-10-CM

## 2023-05-24 DIAGNOSIS — M54.41 CHRONIC RIGHT-SIDED LOW BACK PAIN WITH RIGHT-SIDED SCIATICA: ICD-10-CM

## 2023-05-24 DIAGNOSIS — G89.29 CHRONIC RIGHT-SIDED LOW BACK PAIN WITH RIGHT-SIDED SCIATICA: ICD-10-CM

## 2023-05-24 RX ORDER — MELOXICAM 15 MG/1
TABLET ORAL
Qty: 30 TABLET | Refills: 5 | OUTPATIENT
Start: 2023-05-24

## 2023-05-24 RX ORDER — MELOXICAM 15 MG/1
TABLET ORAL
Qty: 30 TABLET | Refills: 5 | Status: SHIPPED | OUTPATIENT
Start: 2023-05-24

## 2023-05-24 NOTE — TELEPHONE ENCOUNTER
Recent Visits  Date Type Provider Dept   04/06/23 Office Visit Guillermo Taversa, DO [facility]   09/28/22 Office Visit Guillermo Taveras, DO [facility]   06/28/22 Office Visit Guillermo Taveras, DO [facility]   12/20/21 Office Visit Guillermo Taveras, DO [facility]   12/09/21 Office Visit Guillermo Taveras, DO [facility]   Showing recent visits within past 540 days with a meds authorizing provider and meeting all other requirements  Future Appointments  Date Type Provider Dept   10/05/23 Appointment Guillermo Taveras, [address] [place] [address]   Showing future appointments within next 150 days with a meds authorizing provider and meeting all other requirements      Future Appointments   Date Time Provider Junior Kim   10/5/2023  8:00 AM Guillermo Taveras, [facility]

## 2023-06-01 ENCOUNTER — TELEPHONE (OUTPATIENT)
Dept: FAMILY MEDICINE CLINIC | Age: 43
End: 2023-06-01

## 2023-06-01 ENCOUNTER — NURSE ONLY (OUTPATIENT)
Dept: FAMILY MEDICINE CLINIC | Age: 43
End: 2023-06-01

## 2023-06-01 VITALS — WEIGHT: 273.8 LBS | BODY MASS INDEX: 39.29 KG/M2

## 2023-06-01 DIAGNOSIS — G89.29 CHRONIC RIGHT-SIDED LOW BACK PAIN WITH RIGHT-SIDED SCIATICA: ICD-10-CM

## 2023-06-01 DIAGNOSIS — M54.41 CHRONIC RIGHT-SIDED LOW BACK PAIN WITH RIGHT-SIDED SCIATICA: ICD-10-CM

## 2023-06-01 DIAGNOSIS — M51.36 DEGENERATIVE DISC DISEASE, LUMBAR: ICD-10-CM

## 2023-06-01 DIAGNOSIS — E66.01 MORBID OBESITY WITH BMI OF 40.0-44.9, ADULT (HCC): Primary | ICD-10-CM

## 2023-06-01 DIAGNOSIS — E66.01 MORBID OBESITY WITH BMI OF 40.0-44.9, ADULT (HCC): ICD-10-CM

## 2023-06-01 RX ORDER — PHENTERMINE HYDROCHLORIDE 37.5 MG/1
37.5 TABLET ORAL
Qty: 30 TABLET | Refills: 0 | Status: SHIPPED | OUTPATIENT
Start: 2023-06-03 | End: 2023-07-03

## 2023-06-01 NOTE — PROGRESS NOTES
Patient here for weigh in   Wt Readings from Last 3 Encounters:   06/01/23 273 lb 12.8 oz (124.2 kg)   05/04/23 277 lb 3.2 oz (125.7 kg)   04/06/23 286 lb (129.7 kg)

## 2023-06-27 DIAGNOSIS — E89.0 POSTOPERATIVE HYPOTHYROIDISM: ICD-10-CM

## 2023-06-28 RX ORDER — LEVOTHYROXINE SODIUM 0.03 MG/1
25 TABLET ORAL DAILY
Qty: 90 TABLET | Refills: 3 | Status: SHIPPED | OUTPATIENT
Start: 2023-06-28

## 2023-08-01 ENCOUNTER — OFFICE VISIT (OUTPATIENT)
Dept: FAMILY MEDICINE CLINIC | Age: 43
End: 2023-08-01
Payer: COMMERCIAL

## 2023-08-01 VITALS
OXYGEN SATURATION: 97 % | BODY MASS INDEX: 39.54 KG/M2 | DIASTOLIC BLOOD PRESSURE: 82 MMHG | SYSTOLIC BLOOD PRESSURE: 132 MMHG | HEART RATE: 74 BPM | WEIGHT: 276.2 LBS | HEIGHT: 70 IN | RESPIRATION RATE: 16 BRPM | TEMPERATURE: 97 F

## 2023-08-01 DIAGNOSIS — L91.8 SKIN TAG: ICD-10-CM

## 2023-08-01 DIAGNOSIS — Z12.11 SCREENING FOR COLON CANCER: ICD-10-CM

## 2023-08-01 DIAGNOSIS — S83.206A POSITIVE MCMURRAY TEST OF RIGHT KNEE, INITIAL ENCOUNTER: ICD-10-CM

## 2023-08-01 DIAGNOSIS — M25.561 ACUTE PAIN OF RIGHT KNEE: Primary | ICD-10-CM

## 2023-08-01 DIAGNOSIS — E89.0 POSTOPERATIVE HYPOTHYROIDISM: ICD-10-CM

## 2023-08-01 DIAGNOSIS — E66.9 OBESITY (BMI 30-39.9): Chronic | ICD-10-CM

## 2023-08-01 PROCEDURE — 3079F DIAST BP 80-89 MM HG: CPT | Performed by: FAMILY MEDICINE

## 2023-08-01 PROCEDURE — 11303 SHAVE SKIN LESION >2.0 CM: CPT | Performed by: FAMILY MEDICINE

## 2023-08-01 PROCEDURE — 3075F SYST BP GE 130 - 139MM HG: CPT | Performed by: FAMILY MEDICINE

## 2023-08-01 PROCEDURE — 99214 OFFICE O/P EST MOD 30 MIN: CPT | Performed by: FAMILY MEDICINE

## 2023-08-03 ENCOUNTER — TELEPHONE (OUTPATIENT)
Dept: FAMILY MEDICINE CLINIC | Age: 43
End: 2023-08-03

## 2023-08-03 NOTE — TELEPHONE ENCOUNTER
----- Message from Brandy Tinajero DO sent at 8/3/2023  1:55 PM EDT -----  Please let pt know that lesion removed is a large skin tag. No other findings  Let me know if questions, thanks!

## 2023-08-08 ENCOUNTER — HOSPITAL ENCOUNTER (OUTPATIENT)
Age: 43
Discharge: HOME | End: 2023-08-08
Payer: COMMERCIAL

## 2023-08-08 ENCOUNTER — HOSPITAL ENCOUNTER (OUTPATIENT)
Dept: GENERAL RADIOLOGY | Age: 43
Discharge: HOME | End: 2023-08-08
Payer: COMMERCIAL

## 2023-08-08 DIAGNOSIS — M25.561 ACUTE PAIN OF RIGHT KNEE: ICD-10-CM

## 2023-08-08 DIAGNOSIS — S83.206A POSITIVE MCMURRAY TEST OF RIGHT KNEE, INITIAL ENCOUNTER: ICD-10-CM

## 2023-08-08 PROCEDURE — 73564 X-RAY EXAM KNEE 4 OR MORE: CPT

## 2023-08-09 ENCOUNTER — TELEPHONE (OUTPATIENT)
Dept: FAMILY MEDICINE CLINIC | Age: 43
End: 2023-08-09

## 2023-08-09 NOTE — TELEPHONE ENCOUNTER
----- Message from Ailyn Marti, DO sent at 8/8/2023  7:50 PM EDT -----  Please let pt know that xray shows worsening arthritic changes  No fxr  Con't with plan from office for MRI  Let me know if questions, thanks!

## 2023-08-16 ENCOUNTER — HOSPITAL ENCOUNTER (OUTPATIENT)
Dept: MRI IMAGING | Age: 43
Discharge: HOME | End: 2023-08-16
Attending: FAMILY MEDICINE
Payer: COMMERCIAL

## 2023-08-16 ENCOUNTER — TELEPHONE (OUTPATIENT)
Dept: FAMILY MEDICINE CLINIC | Age: 43
End: 2023-08-16

## 2023-08-16 DIAGNOSIS — M94.261 CHONDROMALACIA, KNEE, RIGHT: ICD-10-CM

## 2023-08-16 DIAGNOSIS — S83.241A ACUTE MEDIAL MENISCUS TEAR OF RIGHT KNEE, INITIAL ENCOUNTER: Primary | ICD-10-CM

## 2023-08-16 DIAGNOSIS — S83.519A PARTIAL TEAR OF ANTERIOR CRUCIATE LIGAMENT OF KNEE: ICD-10-CM

## 2023-08-16 DIAGNOSIS — M25.561 ACUTE PAIN OF RIGHT KNEE: ICD-10-CM

## 2023-08-16 DIAGNOSIS — M17.11 PRIMARY OSTEOARTHRITIS OF RIGHT KNEE: ICD-10-CM

## 2023-08-16 DIAGNOSIS — S83.206A POSITIVE MCMURRAY TEST OF RIGHT KNEE, INITIAL ENCOUNTER: ICD-10-CM

## 2023-08-16 PROCEDURE — 73721 MRI JNT OF LWR EXTRE W/O DYE: CPT

## 2023-08-16 NOTE — TELEPHONE ENCOUNTER
----- Message from Benjamin Cardona DO sent at 8/16/2023  3:23 PM EDT -----  Please let pt know that MRI is most c/w tear in the meniscus as well as partial tear of her ACL as well as some cartilage damage and arthritis. Recommend we get her setup with orthopedics. I've placed a referral to [facility]    Let me know if questions, thanks!

## 2023-09-04 ENCOUNTER — APPOINTMENT (OUTPATIENT)
Dept: GENERAL RADIOLOGY | Age: 43
End: 2023-09-04
Payer: COMMERCIAL

## 2023-09-04 ENCOUNTER — HOSPITAL ENCOUNTER (EMERGENCY)
Age: 43
Discharge: HOME | End: 2023-09-04
Attending: EMERGENCY MEDICINE
Payer: COMMERCIAL

## 2023-09-04 VITALS
TEMPERATURE: 97.9 F | WEIGHT: 275 LBS | HEART RATE: 86 BPM | OXYGEN SATURATION: 99 % | DIASTOLIC BLOOD PRESSURE: 98 MMHG | BODY MASS INDEX: 39.46 KG/M2 | RESPIRATION RATE: 16 BRPM | SYSTOLIC BLOOD PRESSURE: 157 MMHG

## 2023-09-04 DIAGNOSIS — R07.9 CHEST PAIN, UNSPECIFIED TYPE: Primary | ICD-10-CM

## 2023-09-04 LAB
ALBUMIN SERPL BCG-MCNC: 4.3 G/DL (ref 3.5–5.1)
ALP SERPL-CCNC: 104 U/L (ref 38–126)
ALT SERPL W/O P-5'-P-CCNC: 24 U/L (ref 11–66)
ANION GAP SERPL CALC-SCNC: 14 MEQ/L (ref 8–16)
AST SERPL-CCNC: 19 U/L (ref 5–40)
BASOPHILS ABSOLUTE: 0.1 THOU/MM3 (ref 0–0.1)
BASOPHILS NFR BLD AUTO: 0.8 %
BILIRUB SERPL-MCNC: 0.2 MG/DL (ref 0.3–1.2)
BUN SERPL-MCNC: 24 MG/DL (ref 7–22)
CALCIUM SERPL-MCNC: 9.9 MG/DL (ref 8.5–10.5)
CHLORIDE SERPL-SCNC: 104 MEQ/L (ref 98–111)
CO2 SERPL-SCNC: 24 MEQ/L (ref 23–33)
CREAT SERPL-MCNC: 0.8 MG/DL (ref 0.4–1.2)
D DIMER PPP IA.FEU-MCNC: 226 NG/ML FEU (ref 0–500)
DEPRECATED RDW RBC AUTO: 43.2 FL (ref 35–45)
EOSINOPHIL NFR BLD AUTO: 2.1 %
EOSINOPHILS ABSOLUTE: 0.2 THOU/MM3 (ref 0–0.4)
ERYTHROCYTE [DISTWIDTH] IN BLOOD BY AUTOMATED COUNT: 13.3 % (ref 11.5–14.5)
FLUAV RNA RESP QL NAA+PROBE: NOT DETECTED
FLUBV RNA RESP QL NAA+PROBE: NOT DETECTED
GFR SERPL CREATININE-BSD FRML MDRD: > 60 ML/MIN/1.73M2
GLUCOSE SERPL-MCNC: 109 MG/DL (ref 70–108)
HCT VFR BLD AUTO: 47.2 % (ref 37–47)
HGB BLD-MCNC: 14.9 GM/DL (ref 12–16)
IMM GRANULOCYTES # BLD AUTO: 0.08 THOU/MM3 (ref 0–0.07)
IMM GRANULOCYTES NFR BLD AUTO: 0.8 %
LYMPHOCYTES ABSOLUTE: 2.7 THOU/MM3 (ref 1–4.8)
LYMPHOCYTES NFR BLD AUTO: 26.7 %
MAGNESIUM SERPL-MCNC: 2.1 MG/DL (ref 1.6–2.4)
MCH RBC QN AUTO: 28.1 PG (ref 26–33)
MCHC RBC AUTO-ENTMCNC: 31.6 GM/DL (ref 32.2–35.5)
MCV RBC AUTO: 88.9 FL (ref 81–99)
MONOCYTES ABSOLUTE: 0.6 THOU/MM3 (ref 0.4–1.3)
MONOCYTES NFR BLD AUTO: 5.9 %
NEUTROPHILS NFR BLD AUTO: 63.7 %
NRBC BLD AUTO-RTO: 0 /100 WBC
NT-PROBNP SERPL IA-MCNC: < 36 PG/ML (ref 0–124)
OSMOLALITY SERPL CALC.SUM OF ELEC: 287.7 MOSMOL/KG (ref 275–300)
PLATELET # BLD AUTO: 245 THOU/MM3 (ref 130–400)
PMV BLD AUTO: 11.1 FL (ref 9.4–12.4)
POTASSIUM SERPL-SCNC: 4.5 MEQ/L (ref 3.5–5.2)
PROT SERPL-MCNC: 7.6 G/DL (ref 6.1–8)
RBC # BLD AUTO: 5.31 MILL/MM3 (ref 4.2–5.4)
SARS-COV-2 RNA RESP QL NAA+PROBE: NOT DETECTED
SEGMENTED NEUTROPHILS ABSOLUTE COUNT: 6.4 THOU/MM3 (ref 1.8–7.7)
SODIUM SERPL-SCNC: 142 MEQ/L (ref 135–145)
T4 FREE SERPL-MCNC: 1.21 NG/DL (ref 0.93–1.76)
TROPONIN, HIGH SENSITIVITY: < 6 NG/L (ref 0–12)
TROPONIN, HIGH SENSITIVITY: < 6 NG/L (ref 0–12)
TSH SERPL DL<=0.005 MIU/L-ACNC: 7.65 UIU/ML (ref 0.4–4.2)
WBC # BLD AUTO: 10.1 THOU/MM3 (ref 4.8–10.8)

## 2023-09-04 PROCEDURE — 84443 ASSAY THYROID STIM HORMONE: CPT

## 2023-09-04 PROCEDURE — 85025 COMPLETE CBC W/AUTO DIFF WBC: CPT

## 2023-09-04 PROCEDURE — 99285 EMERGENCY DEPT VISIT HI MDM: CPT

## 2023-09-04 PROCEDURE — 87636 SARSCOV2 & INF A&B AMP PRB: CPT

## 2023-09-04 PROCEDURE — 6370000000 HC RX 637 (ALT 250 FOR IP): Performed by: EMERGENCY MEDICINE

## 2023-09-04 PROCEDURE — 80053 COMPREHEN METABOLIC PANEL: CPT

## 2023-09-04 PROCEDURE — 93010 ELECTROCARDIOGRAM REPORT: CPT | Performed by: INTERNAL MEDICINE

## 2023-09-04 PROCEDURE — 93005 ELECTROCARDIOGRAM TRACING: CPT | Performed by: EMERGENCY MEDICINE

## 2023-09-04 PROCEDURE — 83880 ASSAY OF NATRIURETIC PEPTIDE: CPT

## 2023-09-04 PROCEDURE — 85379 FIBRIN DEGRADATION QUANT: CPT

## 2023-09-04 PROCEDURE — 84439 ASSAY OF FREE THYROXINE: CPT

## 2023-09-04 PROCEDURE — 83735 ASSAY OF MAGNESIUM: CPT

## 2023-09-04 PROCEDURE — 71046 X-RAY EXAM CHEST 2 VIEWS: CPT

## 2023-09-04 PROCEDURE — 36415 COLL VENOUS BLD VENIPUNCTURE: CPT

## 2023-09-04 PROCEDURE — 84484 ASSAY OF TROPONIN QUANT: CPT

## 2023-09-04 RX ORDER — FAMOTIDINE 20 MG/1
20 TABLET, FILM COATED ORAL ONCE
Status: COMPLETED | OUTPATIENT
Start: 2023-09-04 | End: 2023-09-04

## 2023-09-04 RX ORDER — PANTOPRAZOLE SODIUM 40 MG/1
40 TABLET, DELAYED RELEASE ORAL
Qty: 30 TABLET | Refills: 0 | Status: SHIPPED | OUTPATIENT
Start: 2023-09-04

## 2023-09-04 RX ADMIN — Medication: at 07:24

## 2023-09-04 RX ADMIN — FAMOTIDINE 20 MG: 20 TABLET, FILM COATED ORAL at 07:22

## 2023-09-04 ASSESSMENT — HEART SCORE: ECG: 0

## 2023-09-04 ASSESSMENT — PAIN - FUNCTIONAL ASSESSMENT: PAIN_FUNCTIONAL_ASSESSMENT: 0-10

## 2023-09-04 ASSESSMENT — PAIN SCALES - GENERAL: PAINLEVEL_OUTOF10: 9

## 2023-09-04 NOTE — ED TRIAGE NOTES
Patient presents to ED with c/o new onset of SOB and chest pain starting around 0450. Patient states the pain is on the right side of chest. Patient denies any numbness or tingling. Patient appears to be having labored breathing at this time. Patient states the pain is 9/10 and she feels nauseous. EKG obtained at this time.

## 2023-09-04 NOTE — DISCHARGE INSTRUCTIONS
You are seen at [facility] emergency department for chest pain. We did not identify any life-threatening conditions at this time. You have been prescribed 1 month of Protonix, an antacid, please take this every day as prescribed, and see if it helps resolve some of your symptoms over time. You have also been scheduled a follow-up cardiology appointment tomorrow at 10 AM.  Please attend this appointment, for further evaluation of your chest pain. Do not hesitate to return to the emergency department if your symptoms worsen.

## 2023-09-05 ENCOUNTER — OFFICE VISIT (OUTPATIENT)
Dept: CARDIOLOGY CLINIC | Age: 43
End: 2023-09-05
Payer: COMMERCIAL

## 2023-09-05 VITALS
HEIGHT: 70 IN | BODY MASS INDEX: 40.17 KG/M2 | DIASTOLIC BLOOD PRESSURE: 82 MMHG | WEIGHT: 280.6 LBS | HEART RATE: 80 BPM | SYSTOLIC BLOOD PRESSURE: 128 MMHG

## 2023-09-05 DIAGNOSIS — I20.8 ANGINA OF EFFORT (HCC): Primary | ICD-10-CM

## 2023-09-05 DIAGNOSIS — R53.83 OTHER FATIGUE: ICD-10-CM

## 2023-09-05 PROCEDURE — 3074F SYST BP LT 130 MM HG: CPT | Performed by: INTERNAL MEDICINE

## 2023-09-05 PROCEDURE — 99204 OFFICE O/P NEW MOD 45 MIN: CPT | Performed by: INTERNAL MEDICINE

## 2023-09-05 PROCEDURE — 3079F DIAST BP 80-89 MM HG: CPT | Performed by: INTERNAL MEDICINE

## 2023-09-05 RX ORDER — CELECOXIB 200 MG/1
200 CAPSULE ORAL DAILY
COMMUNITY
Start: 2023-08-31

## 2023-09-05 RX ORDER — ASPIRIN 81 MG/1
81 TABLET ORAL DAILY
Qty: 90 TABLET | Refills: 1 | Status: SHIPPED | OUTPATIENT
Start: 2023-09-05

## 2023-09-05 NOTE — PROGRESS NOTES
[address]  Dept: [phone]  Dept Fax: [phone]  Loc: [phone]    Visit Date: 9/5/2023    Ms. Kira Bates is a 37 y.o. female  who presented for:  Post ER visit  New referral  Chest pain  Shortness of breath   HPI:   MARLA Thomas is a pleasant 37year old female patient who  has a past medical history of Adrenal adenoma; bilateral. Stable and benign on CT, Hypertension, essential, Hypothyroidism, postoperative, Obesity (BMI 30-39.9), and S/P partial thyroidectomy. Her FH is positive for stroke in her mother. She denies smoking Hx. Patient was seen in ER with chest pain, SOB. EKG revealed NSR. HS Troponin <6, <6. ACS was ruled out. No acute findings on CXR. D-Dimer 226. Patient was referred for further cardiac evaluation. The patient states that chest pain occurred while she was in shower, retrosternal, radiated to left side, sharp, 9/10. This associated with shortness of breath and dizziness. She had less severe episode of chest pain today in AM, self limiting. Feels that chest pain can be induced by stress. /82. She reports fatigue. Also, reports some leg swelling. Current Outpatient Medications:     pantoprazole (PROTONIX) 40 MG tablet, Take 1 tablet by mouth every morning (before breakfast), Disp: 30 tablet, Rfl: 0    levothyroxine (SYNTHROID) 25 MCG tablet, Take 1 tablet by mouth daily, Disp: 90 tablet, Rfl: 3    phentermine (ADIPEX-P) 37.5 MG tablet, Take 1 tablet by mouth every morning (before breakfast) for 30 days.  Max Daily Amount: 37.5 mg, Disp: 30 tablet, Rfl: 0    meloxicam (MOBIC) 15 MG tablet, take 1 tablet by mouth once daily AS NEEDED FOR PAIN, Disp: 30 tablet, Rfl: 5    Past Medical History  Arnulfo Main  has a past medical history of Adrenal adenoma; bilateral. Stable and benign on CT, Hypertension, essential, Hypothyroidism, postoperative, Obesity (BMI 30-39.9), and

## 2023-09-06 LAB
EKG ATRIAL RATE: 89 BPM
EKG P AXIS: 45 DEGREES
EKG P-R INTERVAL: 164 MS
EKG Q-T INTERVAL: 372 MS
EKG QRS DURATION: 80 MS
EKG QTC CALCULATION (BAZETT): 452 MS
EKG R AXIS: 3 DEGREES
EKG T AXIS: 18 DEGREES
EKG VENTRICULAR RATE: 89 BPM

## 2023-09-13 ENCOUNTER — HOSPITAL ENCOUNTER (OUTPATIENT)
Dept: NON INVASIVE DIAGNOSTICS | Age: 43
Discharge: HOME | End: 2023-09-13
Attending: INTERNAL MEDICINE
Payer: COMMERCIAL

## 2023-09-13 VITALS — BODY MASS INDEX: 39.37 KG/M2 | WEIGHT: 275 LBS | HEIGHT: 70 IN

## 2023-09-13 DIAGNOSIS — I20.8 ANGINA OF EFFORT (HCC): ICD-10-CM

## 2023-09-13 DIAGNOSIS — R53.83 OTHER FATIGUE: ICD-10-CM

## 2023-09-13 PROCEDURE — 3430000000 HC RX DIAGNOSTIC RADIOPHARMACEUTICAL: Performed by: INTERNAL MEDICINE

## 2023-09-13 PROCEDURE — 93017 CV STRESS TEST TRACING ONLY: CPT | Performed by: INTERNAL MEDICINE

## 2023-09-13 PROCEDURE — 6360000002 HC RX W HCPCS

## 2023-09-13 PROCEDURE — A9500 TC99M SESTAMIBI: HCPCS | Performed by: INTERNAL MEDICINE

## 2023-09-13 PROCEDURE — 93306 TTE W/DOPPLER COMPLETE: CPT

## 2023-09-13 PROCEDURE — 78452 HT MUSCLE IMAGE SPECT MULT: CPT | Performed by: INTERNAL MEDICINE

## 2023-09-13 RX ORDER — TETRAKIS(2-METHOXYISOBUTYLISOCYANIDE)COPPER(I) TETRAFLUOROBORATE 1 MG/ML
32.1 INJECTION, POWDER, LYOPHILIZED, FOR SOLUTION INTRAVENOUS
Status: COMPLETED | OUTPATIENT
Start: 2023-09-13 | End: 2023-09-13

## 2023-09-13 RX ORDER — TETRAKIS(2-METHOXYISOBUTYLISOCYANIDE)COPPER(I) TETRAFLUOROBORATE 1 MG/ML
9.01 INJECTION, POWDER, LYOPHILIZED, FOR SOLUTION INTRAVENOUS
Status: COMPLETED | OUTPATIENT
Start: 2023-09-13 | End: 2023-09-13

## 2023-09-13 RX ADMIN — Medication 32.1 MILLICURIE: at 10:42

## 2023-09-13 RX ADMIN — Medication 9.01 MILLICURIE: at 09:30

## 2023-10-30 NOTE — TELEPHONE ENCOUNTER
ASSESSMENT & PLAN   Diagnosis Orders   1. Morbid obesity with BMI of 40.0-44.9, adult (HCC)  phentermine (ADIPEX-P) 37.5 MG tablet          OAARS reviewed and appropriate. Controlled Substances Monitoring: Periodic Controlled Substance Monitoring: Possible medication side effects, risk of tolerance/dependence & alternative treatments discussed., No signs of potential drug abuse or diversion identified.  Eleonora Cruz, DO)      Future Appointments   Date Time Provider Junior Reesei   9/27/2022  8:00 AM Eleonora Cruz, [address] [place] [address] Stable

## 2024-04-12 ENCOUNTER — TELEPHONE (OUTPATIENT)
Dept: FAMILY MEDICINE CLINIC | Age: 44
End: 2024-04-12

## 2024-04-12 DIAGNOSIS — R73.03 PREDIABETES: ICD-10-CM

## 2024-04-12 DIAGNOSIS — I10 HYPERTENSION, ESSENTIAL: Primary | ICD-10-CM

## 2024-04-12 DIAGNOSIS — R73.9 HYPERGLYCEMIA: ICD-10-CM

## 2024-04-12 DIAGNOSIS — E89.0 POSTOPERATIVE HYPOTHYROIDISM: ICD-10-CM

## 2024-04-12 NOTE — TELEPHONE ENCOUNTER
Pt informed and understanding with no further questions at this time.     Orders in Epic/ will go to [facility]

## 2024-04-12 NOTE — TELEPHONE ENCOUNTER
Pt due for fasting labs prior to next apt on 5/1/2024. Please call to have pt complete this. Thanks!    ASSESSMENT & PLAN   Diagnosis Orders   1. Hypertension, essential  CBC with Auto Differential    Comprehensive Metabolic Panel    Hemoglobin A1C    Insulin, Fasting    Lipid Panel    TSH with Reflex    Microalbumin / Creatinine Urine Ratio      2. Hypothyroidism, postoperative  CBC with Auto Differential    Comprehensive Metabolic Panel    Hemoglobin A1C    Insulin, Fasting    Lipid Panel    TSH with Reflex    Microalbumin / Creatinine Urine Ratio      3. Hyperglycemia  CBC with Auto Differential    Comprehensive Metabolic Panel    Hemoglobin A1C    Insulin, Fasting    Lipid Panel    TSH with Reflex    Microalbumin / Creatinine Urine Ratio      4. Prediabetes  CBC with Auto Differential    Comprehensive Metabolic Panel    Hemoglobin A1C    Insulin, Fasting    Lipid Panel    TSH with Reflex    Microalbumin / Creatinine Urine Ratio        Future Appointments   Date Time Provider Department Center   5/1/2024  9:20 AM Richie Cha DO [facility] [place]   9/4/2024 12:45 PM Patito Arroyo MD [facility] [place]

## 2024-04-30 ENCOUNTER — NURSE ONLY (OUTPATIENT)
Dept: LAB | Age: 44
End: 2024-04-30

## 2024-04-30 DIAGNOSIS — I10 HYPERTENSION, ESSENTIAL: ICD-10-CM

## 2024-04-30 DIAGNOSIS — E89.0 POSTOPERATIVE HYPOTHYROIDISM: ICD-10-CM

## 2024-04-30 DIAGNOSIS — R73.03 PREDIABETES: ICD-10-CM

## 2024-04-30 DIAGNOSIS — R73.9 HYPERGLYCEMIA: ICD-10-CM

## 2024-04-30 LAB
ALBUMIN SERPL BCG-MCNC: 4.1 G/DL (ref 3.5–5.1)
ALP SERPL-CCNC: 85 U/L (ref 38–126)
ALT SERPL W/O P-5'-P-CCNC: 18 U/L (ref 11–66)
ANION GAP SERPL CALC-SCNC: 10 MEQ/L (ref 8–16)
AST SERPL-CCNC: 14 U/L (ref 5–40)
BASOPHILS ABSOLUTE: 0 THOU/MM3 (ref 0–0.1)
BASOPHILS NFR BLD AUTO: 0.5 %
BILIRUB SERPL-MCNC: 0.2 MG/DL (ref 0.3–1.2)
BUN SERPL-MCNC: 23 MG/DL (ref 7–22)
CALCIUM SERPL-MCNC: 9.5 MG/DL (ref 8.5–10.5)
CHLORIDE SERPL-SCNC: 104 MEQ/L (ref 98–111)
CHOLEST SERPL-MCNC: 177 MG/DL (ref 100–199)
CO2 SERPL-SCNC: 26 MEQ/L (ref 23–33)
CREAT SERPL-MCNC: 0.7 MG/DL (ref 0.4–1.2)
CREAT UR-MCNC: 79 MG/DL
DEPRECATED MEAN GLUCOSE BLD GHB EST-ACNC: 117 MG/DL (ref 70–126)
DEPRECATED RDW RBC AUTO: 43.8 FL (ref 35–45)
EOSINOPHIL NFR BLD AUTO: 2.1 %
EOSINOPHILS ABSOLUTE: 0.2 THOU/MM3 (ref 0–0.4)
ERYTHROCYTE [DISTWIDTH] IN BLOOD BY AUTOMATED COUNT: 13.6 % (ref 11.5–14.5)
GFR SERPL CREATININE-BSD FRML MDRD: > 90 ML/MIN/1.73M2
GLUCOSE SERPL-MCNC: 109 MG/DL (ref 70–108)
HBA1C MFR BLD HPLC: 5.9 % (ref 4.4–6.4)
HCT VFR BLD AUTO: 42 % (ref 37–47)
HDLC SERPL-MCNC: 48 MG/DL
HGB BLD-MCNC: 13.3 GM/DL (ref 12–16)
IMM GRANULOCYTES # BLD AUTO: 0.05 THOU/MM3 (ref 0–0.07)
IMM GRANULOCYTES NFR BLD AUTO: 0.5 %
LDLC SERPL CALC-MCNC: 107 MG/DL
LYMPHOCYTES ABSOLUTE: 2.4 THOU/MM3 (ref 1–4.8)
LYMPHOCYTES NFR BLD AUTO: 25.5 %
MCH RBC QN AUTO: 27.8 PG (ref 26–33)
MCHC RBC AUTO-ENTMCNC: 31.7 GM/DL (ref 32.2–35.5)
MCV RBC AUTO: 87.9 FL (ref 81–99)
MICROALBUMIN UR-MCNC: < 1.2 MG/DL
MICROALBUMIN/CREAT RATIO PNL UR: 15 MG/G (ref 0–30)
MONOCYTES ABSOLUTE: 0.6 THOU/MM3 (ref 0.4–1.3)
MONOCYTES NFR BLD AUTO: 6.5 %
NEUTROPHILS NFR BLD AUTO: 64.9 %
NRBC BLD AUTO-RTO: 0 /100 WBC
PLATELET # BLD AUTO: 204 THOU/MM3 (ref 130–400)
PMV BLD AUTO: 11.8 FL (ref 9.4–12.4)
POTASSIUM SERPL-SCNC: 4.3 MEQ/L (ref 3.5–5.2)
PROT SERPL-MCNC: 7 G/DL (ref 6.1–8)
RBC # BLD AUTO: 4.78 MILL/MM3 (ref 4.2–5.4)
SEGMENTED NEUTROPHILS ABSOLUTE COUNT: 6.2 THOU/MM3 (ref 1.8–7.7)
SODIUM SERPL-SCNC: 140 MEQ/L (ref 135–145)
TRIGL SERPL-MCNC: 110 MG/DL (ref 0–199)
TSH SERPL DL<=0.005 MIU/L-ACNC: 2.91 UIU/ML (ref 0.4–4.2)
WBC # BLD AUTO: 9.5 THOU/MM3 (ref 4.8–10.8)

## 2024-04-30 SDOH — ECONOMIC STABILITY: INCOME INSECURITY: HOW HARD IS IT FOR YOU TO PAY FOR THE VERY BASICS LIKE FOOD, HOUSING, MEDICAL CARE, AND HEATING?: NOT VERY HARD

## 2024-04-30 SDOH — ECONOMIC STABILITY: FOOD INSECURITY: WITHIN THE PAST 12 MONTHS, YOU WORRIED THAT YOUR FOOD WOULD RUN OUT BEFORE YOU GOT MONEY TO BUY MORE.: NEVER TRUE

## 2024-04-30 SDOH — ECONOMIC STABILITY: FOOD INSECURITY: WITHIN THE PAST 12 MONTHS, THE FOOD YOU BOUGHT JUST DIDN'T LAST AND YOU DIDN'T HAVE MONEY TO GET MORE.: NEVER TRUE

## 2024-04-30 SDOH — ECONOMIC STABILITY: TRANSPORTATION INSECURITY
IN THE PAST 12 MONTHS, HAS LACK OF TRANSPORTATION KEPT YOU FROM MEETINGS, WORK, OR FROM GETTING THINGS NEEDED FOR DAILY LIVING?: NO

## 2024-04-30 ASSESSMENT — PATIENT HEALTH QUESTIONNAIRE - PHQ9
SUM OF ALL RESPONSES TO PHQ QUESTIONS 1-9: 0
SUM OF ALL RESPONSES TO PHQ9 QUESTIONS 1 & 2: 0
2. FEELING DOWN, DEPRESSED OR HOPELESS: NOT AT ALL
SUM OF ALL RESPONSES TO PHQ QUESTIONS 1-9: 0
SUM OF ALL RESPONSES TO PHQ9 QUESTIONS 1 & 2: 0
SUM OF ALL RESPONSES TO PHQ QUESTIONS 1-9: 0
1. LITTLE INTEREST OR PLEASURE IN DOING THINGS: NOT AT ALL
SUM OF ALL RESPONSES TO PHQ QUESTIONS 1-9: 0
2. FEELING DOWN, DEPRESSED OR HOPELESS: NOT AT ALL
1. LITTLE INTEREST OR PLEASURE IN DOING THINGS: NOT AT ALL

## 2024-04-30 NOTE — PROGRESS NOTES
Chief Complaint   Patient presents with    Rash    Follow-up     Chronic issues noted below     History obtained from the patient.    SUBJECTIVE:  Pili Brown is a 44 y.o.  that presents today for     -Rash:  2 months  Steroid crm made a little worse  2 spots posterior neck    Inciting events or exposures prior to rash starting? No  Pruritic?  Yes  Erythematous?  Yes  Weeping or drainage?  No  History of Urticaria?  No  Fever?  No  Painful?  No  New Detergent?  No      -Obesity:  Struggling to lose wt  On adipex in past, helped some  Off for about a year now  On calorie restricted diet  Is exercising.     Wt Readings from Last 3 Encounters:   05/01/24 133.8 kg (295 lb)   09/13/23 124.7 kg (275 lb)   09/05/23 127.3 kg (280 lb 9.6 oz)       -Hypothyroidism:     HPI:  S/p R thyroid lobectomy for large cyst Fall 2019 in [place], [place]     Currently treated for Hypothyroidism?  Yes  Fatigue?  No  Recent change in weight?  No  Cold/Heat intolerance?  No  Diarrhea/Constipation?  No  Diaphoresis?  No  Anxiety?  No  Palpitations?  No   Hair Loss?  No    Lab Results   Component Value Date    TSH 2.910 04/30/2024       -HTN:  Hx of  Weaned off Micardis APR 2023  BPs remain appropriate, <140/90  No CP/SOB      -Chronic low back pain:  Chronic issue  Done with PT  Had MRI, showed arthritic changes  Pt asked to see a surgeon, who agreed didn't need surgery  Was referred to pain management, but did not go  Better than it was before  Has less radicular sxs  Doesn't want to do pain management right now  Wants to work on wt loss  No bowel/bladder issues      Age/Gender Health Maintenance    Lipid -   Lab Results   Component Value Date    CHOL 177 04/30/2024    CHOL 208 (H) 04/06/2023    CHOL 189 09/07/2021     Lab Results   Component Value Date    TRIG 110 04/30/2024    TRIG 130 04/06/2023    TRIG 117 09/07/2021     Lab Results   Component Value Date    HDL 48 04/30/2024    HDL 60 04/06/2023    HDL 45 09/07/2021     No components

## 2024-04-30 NOTE — PATIENT INSTRUCTIONS
LAB INSTRUCTIONS:    Please complete labs within 4 week(s).    Please fast for 8 hours prior to lab collection.    The clinic will call you within 1 week of collection. If you have not heard from us within that amount of time, please call us at [phone].

## 2024-05-01 ENCOUNTER — OFFICE VISIT (OUTPATIENT)
Dept: FAMILY MEDICINE CLINIC | Age: 44
End: 2024-05-01
Payer: COMMERCIAL

## 2024-05-01 VITALS
WEIGHT: 293 LBS | DIASTOLIC BLOOD PRESSURE: 80 MMHG | BODY MASS INDEX: 41.95 KG/M2 | HEART RATE: 78 BPM | HEIGHT: 70 IN | SYSTOLIC BLOOD PRESSURE: 130 MMHG | TEMPERATURE: 97.9 F | RESPIRATION RATE: 18 BRPM | OXYGEN SATURATION: 100 %

## 2024-05-01 DIAGNOSIS — M54.41 CHRONIC RIGHT-SIDED LOW BACK PAIN WITH RIGHT-SIDED SCIATICA: ICD-10-CM

## 2024-05-01 DIAGNOSIS — E66.01 MORBID OBESITY WITH BMI OF 40.0-44.9, ADULT (HCC): ICD-10-CM

## 2024-05-01 DIAGNOSIS — Z86.79 HISTORY OF HYPERTENSION: ICD-10-CM

## 2024-05-01 DIAGNOSIS — B35.9 TINEA: Primary | ICD-10-CM

## 2024-05-01 DIAGNOSIS — M51.36 DEGENERATIVE DISC DISEASE, LUMBAR: ICD-10-CM

## 2024-05-01 DIAGNOSIS — E89.0 S/P PARTIAL THYROIDECTOMY: Chronic | ICD-10-CM

## 2024-05-01 DIAGNOSIS — Z12.39 BREAST CANCER SCREENING, HIGH RISK PATIENT: ICD-10-CM

## 2024-05-01 DIAGNOSIS — G89.29 CHRONIC RIGHT-SIDED LOW BACK PAIN WITH RIGHT-SIDED SCIATICA: ICD-10-CM

## 2024-05-01 DIAGNOSIS — E89.0 POSTOPERATIVE HYPOTHYROIDISM: Chronic | ICD-10-CM

## 2024-05-01 DIAGNOSIS — Z12.31 ENCOUNTER FOR SCREENING MAMMOGRAM FOR MALIGNANT NEOPLASM OF BREAST: ICD-10-CM

## 2024-05-01 DIAGNOSIS — Z80.3 FAMILY HISTORY OF BREAST CANCER: ICD-10-CM

## 2024-05-01 DIAGNOSIS — Z12.11 SCREENING FOR COLON CANCER: ICD-10-CM

## 2024-05-01 LAB — INSULIN SERPL-ACNC: 19.1 MU/L

## 2024-05-01 PROCEDURE — 99214 OFFICE O/P EST MOD 30 MIN: CPT | Performed by: FAMILY MEDICINE

## 2024-05-01 PROCEDURE — 3075F SYST BP GE 130 - 139MM HG: CPT | Performed by: FAMILY MEDICINE

## 2024-05-01 PROCEDURE — 3079F DIAST BP 80-89 MM HG: CPT | Performed by: FAMILY MEDICINE

## 2024-05-01 RX ORDER — TIRZEPATIDE 2.5 MG/.5ML
2.5 INJECTION, SOLUTION SUBCUTANEOUS WEEKLY
Qty: 2 ML | Refills: 0 | Status: SHIPPED | OUTPATIENT
Start: 2024-05-01 | End: 2024-05-23

## 2024-05-01 RX ORDER — TIRZEPATIDE 7.5 MG/.5ML
7.5 INJECTION, SOLUTION SUBCUTANEOUS WEEKLY
Qty: 2 ML | Refills: 0 | Status: SHIPPED | OUTPATIENT
Start: 2024-06-26

## 2024-05-01 RX ORDER — MELOXICAM 15 MG/1
TABLET ORAL
Qty: 30 TABLET | Refills: 5 | Status: SHIPPED | OUTPATIENT
Start: 2024-05-01

## 2024-05-01 RX ORDER — TIRZEPATIDE 10 MG/.5ML
10 INJECTION, SOLUTION SUBCUTANEOUS WEEKLY
Qty: 2 ML | Refills: 3 | Status: SHIPPED | OUTPATIENT
Start: 2024-07-24

## 2024-05-01 RX ORDER — MELOXICAM 15 MG/1
15 TABLET ORAL DAILY
COMMUNITY
Start: 2023-05-24 | End: 2024-05-01 | Stop reason: SDUPTHER

## 2024-05-01 RX ORDER — TIZANIDINE 4 MG/1
4 TABLET ORAL EVERY 8 HOURS PRN
Qty: 90 TABLET | Refills: 0 | Status: SHIPPED | OUTPATIENT
Start: 2024-05-01

## 2024-05-01 RX ORDER — KETOCONAZOLE 20 MG/G
CREAM TOPICAL
Qty: 60 G | Refills: 0 | Status: SHIPPED | OUTPATIENT
Start: 2024-05-01

## 2024-05-01 RX ORDER — TIRZEPATIDE 5 MG/.5ML
5 INJECTION, SOLUTION SUBCUTANEOUS WEEKLY
Qty: 2 ML | Refills: 0 | Status: SHIPPED | OUTPATIENT
Start: 2024-05-29 | End: 2024-06-20

## 2024-05-03 ENCOUNTER — TELEPHONE (OUTPATIENT)
Dept: FAMILY MEDICINE CLINIC | Age: 44
End: 2024-05-03

## 2024-05-03 NOTE — TELEPHONE ENCOUNTER
----- Message from Richie Cha, DO sent at 5/3/2024  6:20 AM EDT -----  Please call pt and see if she was able to pick the Zepbound or if it required a PA?    Let me know, thanks!

## 2024-05-03 NOTE — TELEPHONE ENCOUNTER
Called and spoke to patient and she states that they were out of stock and she has called a few pharmacies around here and they are all out of stock!     Patient is unsure of the next step!

## 2024-05-03 NOTE — TELEPHONE ENCOUNTER
Called and spoke to patient and she stated that the lady was very confusing about the coverage for medication and said they couldn't know coverage possible until medication in stock and stated that it could need a PA, but she was not for certain.  I can start a PA and least see if its needed?

## 2024-05-06 ENCOUNTER — TELEPHONE (OUTPATIENT)
Dept: FAMILY MEDICINE CLINIC | Age: 44
End: 2024-05-06

## 2024-05-06 NOTE — TELEPHONE ENCOUNTER
Please see attached denial from [facility] for Zepbound     \"The requested medication is not covered by the plan. Medications used for weight loss are not covered under your health plan's pharmacy coverage.\"    Please advise

## 2024-05-06 NOTE — TELEPHONE ENCOUNTER
Please let pt know that her insurance does not cover weight loss medication, including the Rx we sent in.     We could use a compounding pharmacy instead, I believe we discussed this in office.     Let me know what she would like to do.     Let me know if questions, thanks!

## 2024-05-20 ENCOUNTER — TELEPHONE (OUTPATIENT)
Dept: FAMILY MEDICINE CLINIC | Age: 44
End: 2024-05-20

## 2024-05-20 ENCOUNTER — HOSPITAL ENCOUNTER (OUTPATIENT)
Dept: WOMENS IMAGING | Age: 44
Discharge: HOME | End: 2024-05-20
Attending: FAMILY MEDICINE
Payer: COMMERCIAL

## 2024-05-20 ENCOUNTER — HOSPITAL ENCOUNTER (OUTPATIENT)
Dept: MRI IMAGING | Age: 44
Discharge: HOME | End: 2024-05-20
Attending: FAMILY MEDICINE
Payer: COMMERCIAL

## 2024-05-20 VITALS — BODY MASS INDEX: 41.95 KG/M2 | HEIGHT: 70 IN | WEIGHT: 293 LBS

## 2024-05-20 DIAGNOSIS — Z12.31 ENCOUNTER FOR SCREENING MAMMOGRAM FOR MALIGNANT NEOPLASM OF BREAST: ICD-10-CM

## 2024-05-20 DIAGNOSIS — Z80.3 FAMILY HISTORY OF BREAST CANCER: ICD-10-CM

## 2024-05-20 DIAGNOSIS — Z12.39 BREAST CANCER SCREENING, HIGH RISK PATIENT: ICD-10-CM

## 2024-05-20 PROCEDURE — 6360000004 HC RX CONTRAST MEDICATION: Performed by: FAMILY MEDICINE

## 2024-05-20 PROCEDURE — C8908 MRI W/O FOL W/CONT, BREAST,: HCPCS

## 2024-05-20 PROCEDURE — A9579 GAD-BASE MR CONTRAST NOS,1ML: HCPCS | Performed by: FAMILY MEDICINE

## 2024-05-20 PROCEDURE — 77063 BREAST TOMOSYNTHESIS BI: CPT

## 2024-05-20 RX ADMIN — GADOTERIDOL 20 ML: 279.3 INJECTION, SOLUTION INTRAVENOUS at 13:15

## 2024-05-20 NOTE — TELEPHONE ENCOUNTER
----- Message from Richie Cha, DO sent at 5/20/2024  4:02 PM EDT -----  Please let pt know that TERESO and MRI are both WNL  Repeat 1 year  Let me know if questions, thanks!

## 2024-05-24 ENCOUNTER — PATIENT MESSAGE (OUTPATIENT)
Dept: FAMILY MEDICINE CLINIC | Age: 44
End: 2024-05-24

## 2024-05-24 DIAGNOSIS — L30.9 DERMATITIS: Primary | ICD-10-CM

## 2024-05-24 RX ORDER — TRIAMCINOLONE ACETONIDE 1 MG/G
CREAM TOPICAL
Qty: 45 G | Refills: 0 | Status: SHIPPED | OUTPATIENT
Start: 2024-05-24

## 2024-05-24 NOTE — TELEPHONE ENCOUNTER
From: Pili Brown  To: Dr. Richie Cha  Sent: 5/24/2024 1:35 PM EDT  Subject: Rash on neck    I've been using the cream on my neck for over 3 weeks and it has not gotten any better.

## 2024-05-24 NOTE — TELEPHONE ENCOUNTER
Christiano, can you fill out an Rx for the compounding pharmacy for Tirzepatide for Pili on Tuesday?  I'm at home now.  Sorry, thanks!

## 2024-05-24 NOTE — TELEPHONE ENCOUNTER
From: Pili Brown  To: Dr. Richie Cha  Sent: 5/24/2024 1:42 PM EDT  Subject: Medication     I just saw message from May 7 about my insurance not covering the medication   I would be interested in the Compounding pharmacy.

## 2024-05-28 ENCOUNTER — TELEPHONE (OUTPATIENT)
Dept: FAMILY MEDICINE CLINIC | Age: 44
End: 2024-05-28

## 2024-05-28 NOTE — TELEPHONE ENCOUNTER
----- Message from Richie Cha DO sent at 5/28/2024  6:49 AM EDT -----  Sent pt a [employer] message, left unread.    Please relay the following:   \"Pili,     I sent in a different crm for you. Let me know if that is also ineffective.   Let me know if questions, thanks!     Vr/  Dr. Cha    Previous Messages    ----- Message -----       From:Pili Brown       Sent:5/24/2024  1:35 PM EDT         To:Dr. Richie Cha    Subject:Rash on neck    I've been using the cream on my neck for over 3 weeks and it has not gotten any better\"

## 2024-07-02 DIAGNOSIS — E89.0 POSTOPERATIVE HYPOTHYROIDISM: ICD-10-CM

## 2024-07-02 RX ORDER — LEVOTHYROXINE SODIUM 0.03 MG/1
25 TABLET ORAL DAILY
Qty: 90 TABLET | Refills: 3 | Status: SHIPPED | OUTPATIENT
Start: 2024-07-02

## 2024-07-02 NOTE — TELEPHONE ENCOUNTER
Recent Visits  Date Type Provider Dept   05/01/24 Office Visit Richie Cha, DO [facility]   08/01/23 Office Visit Richie Cha, DO [facility]   04/06/23 Office Visit Richie Cha, DO [facility]   Showing recent visits within past 540 days with a meds authorizing provider and meeting all other requirements  Future Appointments  Date Type Provider Dept   08/05/24 Appointment Richie Cha, DO [facility]   Showing future appointments within next 150 days with a meds authorizing provider and meeting all other requirements

## 2024-07-19 DIAGNOSIS — E89.0 POSTOPERATIVE HYPOTHYROIDISM: ICD-10-CM

## 2024-07-19 DIAGNOSIS — G89.29 CHRONIC RIGHT-SIDED LOW BACK PAIN WITH RIGHT-SIDED SCIATICA: ICD-10-CM

## 2024-07-19 DIAGNOSIS — M51.36 DEGENERATIVE DISC DISEASE, LUMBAR: ICD-10-CM

## 2024-07-19 DIAGNOSIS — M54.41 CHRONIC RIGHT-SIDED LOW BACK PAIN WITH RIGHT-SIDED SCIATICA: ICD-10-CM

## 2024-07-19 RX ORDER — MELOXICAM 15 MG/1
TABLET ORAL
Qty: 30 TABLET | Refills: 5 | Status: SHIPPED | OUTPATIENT
Start: 2024-07-19

## 2024-07-19 RX ORDER — LEVOTHYROXINE SODIUM 0.03 MG/1
25 TABLET ORAL DAILY
Qty: 90 TABLET | Refills: 3 | Status: SHIPPED | OUTPATIENT
Start: 2024-07-19

## 2024-07-28 PROBLEM — E04.2 MULTIPLE THYROID NODULES: Status: RESOLVED | Noted: 2018-09-19 | Resolved: 2024-07-28

## 2024-07-28 NOTE — PROGRESS NOTES
Chief Complaint   Patient presents with    Follow-up     Issues noted below     History obtained from the patient.    SUBJECTIVE:  Pili Brown is a 44 y.o.  that presents today for     -Rash:  Going on a while  On back of neck, 2 spots  Now on forearms as well  Tried on antifungal crm, did not help  Tried steroid crm, did not help  Rash no better, no worse    Inciting events or exposures prior to rash starting? No  Pruritic?  Yes  Erythematous?  Yes  Weeping or drainage?  No  History of Urticaria?  No  Fever?  No  Painful?  No  New Detergent?  No      -Obesity:  Struggling to lose wt  On adipex in past, helped some  Off for about a year now  On calorie restricted diet  Is exercising.   Wts the same  Tried to get Zepbound approved, was denied  Compounding pharmacy too expensive  Pt states Wegov[employer] is covered    Wt Readings from Last 3 Encounters:   07/29/24 133.8 kg (295 lb)   06/03/24 133.4 kg (294 lb)   05/20/24 133.8 kg (295 lb)       -Hypothyroidism:     HPI:  S/p R thyroid lobectomy for large cyst Fall 2019 in [place], [place]     Currently treated for Hypothyroidism?  Yes  Fatigue?  No  Recent change in weight?  No  Cold/Heat intolerance?  No  Diarrhea/Constipation?  No  Diaphoresis?  No  Anxiety?  No  Palpitations?  No   Hair Loss?  No    Lab Results   Component Value Date    TSH 2.910 04/30/2024       -HTN:  Hx of  Weaned off Micardis APR 2023  BPs remain appropriate, <140/90  No CP/SOB      -Chronic low back pain:  Chronic issue  Done with PT  Had MRI, showed arthritic changes  Pt asked to see a surgeon, who agreed didn't need surgery  Was referred to pain management, but did not go  However, pain worse and would like to see pain management now  No bowel/bladder issues      Age/Gender Health Maintenance    Lipid -   Lab Results   Component Value Date    CHOL 177 04/30/2024    CHOL 208 (H) 04/06/2023    CHOL 189 09/07/2021     Lab Results   Component Value Date    TRIG 110 04/30/2024    TRIG 130 04/06/2023

## 2024-07-29 ENCOUNTER — OFFICE VISIT (OUTPATIENT)
Dept: FAMILY MEDICINE CLINIC | Age: 44
End: 2024-07-29
Payer: COMMERCIAL

## 2024-07-29 VITALS
TEMPERATURE: 97 F | HEART RATE: 72 BPM | SYSTOLIC BLOOD PRESSURE: 130 MMHG | OXYGEN SATURATION: 100 % | DIASTOLIC BLOOD PRESSURE: 76 MMHG | RESPIRATION RATE: 18 BRPM | WEIGHT: 293 LBS | HEIGHT: 70 IN | BODY MASS INDEX: 41.95 KG/M2

## 2024-07-29 DIAGNOSIS — G89.29 CHRONIC RIGHT-SIDED LOW BACK PAIN WITH RIGHT-SIDED SCIATICA: ICD-10-CM

## 2024-07-29 DIAGNOSIS — E89.0 S/P PARTIAL THYROIDECTOMY: Chronic | ICD-10-CM

## 2024-07-29 DIAGNOSIS — E89.0 POSTOPERATIVE HYPOTHYROIDISM: Chronic | ICD-10-CM

## 2024-07-29 DIAGNOSIS — M54.41 CHRONIC RIGHT-SIDED LOW BACK PAIN WITH RIGHT-SIDED SCIATICA: ICD-10-CM

## 2024-07-29 DIAGNOSIS — B35.9 TINEA: ICD-10-CM

## 2024-07-29 DIAGNOSIS — L30.9 DERMATITIS: Primary | ICD-10-CM

## 2024-07-29 DIAGNOSIS — E66.01 MORBID OBESITY WITH BMI OF 40.0-44.9, ADULT (HCC): ICD-10-CM

## 2024-07-29 DIAGNOSIS — M51.36 DEGENERATIVE DISC DISEASE, LUMBAR: ICD-10-CM

## 2024-07-29 DIAGNOSIS — Z86.79 HISTORY OF HYPERTENSION: ICD-10-CM

## 2024-07-29 PROCEDURE — 3078F DIAST BP <80 MM HG: CPT | Performed by: FAMILY MEDICINE

## 2024-07-29 PROCEDURE — 99214 OFFICE O/P EST MOD 30 MIN: CPT | Performed by: FAMILY MEDICINE

## 2024-07-29 PROCEDURE — 3075F SYST BP GE 130 - 139MM HG: CPT | Performed by: FAMILY MEDICINE

## 2024-07-29 RX ORDER — FLUCONAZOLE 100 MG/1
200 TABLET ORAL DAILY
Qty: 28 TABLET | Refills: 0 | Status: SHIPPED | OUTPATIENT
Start: 2024-07-29 | End: 2024-07-29 | Stop reason: SDUPTHER

## 2024-07-29 RX ORDER — SEMAGLUTIDE 1.7 MG/.75ML
1.7 INJECTION, SOLUTION SUBCUTANEOUS
Qty: 3 ML | Refills: 0 | Status: SHIPPED | OUTPATIENT
Start: 2024-10-21 | End: 2024-08-01 | Stop reason: RX

## 2024-07-29 RX ORDER — FLUCONAZOLE 100 MG/1
200 TABLET ORAL DAILY
Qty: 28 TABLET | Refills: 0 | Status: SHIPPED | OUTPATIENT
Start: 2024-07-29 | End: 2024-08-12

## 2024-07-29 RX ORDER — SEMAGLUTIDE 0.5 MG/.5ML
0.5 INJECTION, SOLUTION SUBCUTANEOUS
Qty: 2 ML | Refills: 0 | Status: SHIPPED | OUTPATIENT
Start: 2024-08-26 | End: 2024-08-01 | Stop reason: RX

## 2024-07-29 RX ORDER — SEMAGLUTIDE 2.4 MG/.75ML
2.4 INJECTION, SOLUTION SUBCUTANEOUS
Qty: 3 ML | Refills: 5 | Status: SHIPPED | OUTPATIENT
Start: 2024-11-18 | End: 2024-08-01 | Stop reason: RX

## 2024-07-29 RX ORDER — SEMAGLUTIDE 1 MG/.5ML
1 INJECTION, SOLUTION SUBCUTANEOUS
Qty: 2 ML | Refills: 0 | Status: SHIPPED | OUTPATIENT
Start: 2024-09-23 | End: 2024-08-01 | Stop reason: RX

## 2024-07-29 RX ORDER — SEMAGLUTIDE 0.25 MG/.5ML
0.25 INJECTION, SOLUTION SUBCUTANEOUS
Qty: 2 ML | Refills: 0 | Status: SHIPPED | OUTPATIENT
Start: 2024-07-29 | End: 2024-08-01 | Stop reason: RX

## 2024-07-31 ENCOUNTER — TELEPHONE (OUTPATIENT)
Dept: FAMILY MEDICINE CLINIC | Age: 44
End: 2024-07-31

## 2024-07-31 DIAGNOSIS — E66.01 MORBID OBESITY WITH BMI OF 40.0-44.9, ADULT (HCC): Primary | ICD-10-CM

## 2024-07-31 NOTE — TELEPHONE ENCOUNTER
----- Message from Richie Cha DO sent at 7/31/2024  7:10 AM EDT -----  Please see if pt was able to get the Wegovy or if PA needed  Let me know, thanks!

## 2024-07-31 NOTE — TELEPHONE ENCOUNTER
Ok  See what pt wants to do, but I'd recommend we try out the Zepbound, since specifically approved for wt loss, where we may be push back on the Ozepmic, since it's approved for DM2 only    Let me know, thanks!

## 2024-07-31 NOTE — TELEPHONE ENCOUNTER
Spoke to patient, she knows she needs a prior auth but unable to get a hold of someone at [facility]. Has left a message with them and waiting on a call back and will give us a call back when she does.

## 2024-07-31 NOTE — TELEPHONE ENCOUNTER
When I checked cover my meds states no PA needed    Phoned [facility] spoke with Merna she states the issue is there is not sufficient stock and since pt has never filled with them they are needing to use medication for previous members so their tx is not interrupted    They do have oyzempic or zepbound in stock or pt can try a local RX

## 2024-08-01 RX ORDER — TIRZEPATIDE 7.5 MG/.5ML
7.5 INJECTION, SOLUTION SUBCUTANEOUS WEEKLY
Qty: 2 ML | Refills: 0 | Status: SHIPPED | OUTPATIENT
Start: 2024-09-26 | End: 2024-10-18

## 2024-08-01 RX ORDER — TIRZEPATIDE 5 MG/.5ML
5 INJECTION, SOLUTION SUBCUTANEOUS WEEKLY
Qty: 2 ML | Refills: 0 | Status: SHIPPED | OUTPATIENT
Start: 2024-08-29 | End: 2024-09-20

## 2024-08-01 RX ORDER — TIRZEPATIDE 10 MG/.5ML
10 INJECTION, SOLUTION SUBCUTANEOUS WEEKLY
Qty: 2 ML | Refills: 3 | Status: SHIPPED | OUTPATIENT
Start: 2024-10-24

## 2024-08-01 RX ORDER — TIRZEPATIDE 2.5 MG/.5ML
2.5 INJECTION, SOLUTION SUBCUTANEOUS WEEKLY
Qty: 2 ML | Refills: 0 | Status: SHIPPED | OUTPATIENT
Start: 2024-08-01 | End: 2024-08-23

## 2024-08-01 NOTE — TELEPHONE ENCOUNTER
Patient called in stating that [facility] called her stating we \"denied\" a PA for her Zepbound, and they have all of the information, and script but said we denied PA. I don't see where PA was even initiated or anything scanned in her chart.

## 2024-08-01 NOTE — TELEPHONE ENCOUNTER
Ok  Rx sent     Diagnosis Orders   1. Morbid obesity with BMI of 40.0-44.9, adult (HCC)  Tirzepatide-Weight Management (ZEPBOUND) 2.5 MG/0.5ML SOAJ    Tirzepatide-Weight Management (ZEPBOUND) 5 MG/0.5ML SOAJ    Tirzepatide-Weight Management (ZEPBOUND) 7.5 MG/0.5ML SOAJ    Tirzepatide-Weight Management (ZEPBOUND) 10 MG/0.5ML SOAJ

## 2024-08-02 NOTE — TELEPHONE ENCOUNTER
I called and spoke to [facility], they stated they did not receive any PA for 2.5 mg since May and it was denied due to being a excluded medication and now a formulary.    He initiated a PA for 5 mg through Cover my Meds so that was started today.     I called and spoke to patient and let her know and I let her know we will keep her updated.

## 2024-08-09 ENCOUNTER — PATIENT MESSAGE (OUTPATIENT)
Dept: FAMILY MEDICINE CLINIC | Age: 44
End: 2024-08-09

## 2024-08-09 DIAGNOSIS — L30.9 DERMATITIS: Primary | ICD-10-CM

## 2024-08-09 RX ORDER — PREDNISONE 20 MG/1
40 TABLET ORAL DAILY
Qty: 10 TABLET | Refills: 0 | Status: SHIPPED | OUTPATIENT
Start: 2024-08-09 | End: 2024-08-14

## 2024-08-09 NOTE — TELEPHONE ENCOUNTER
From: Pili Brown  To: Dr. Richie Cha  Sent: 8/9/2024 12:20 AM EDT  Subject: Rash on kneck Wednesday I woke up covered with red pumps all over my neck, chest, face and today it is starting on my arms. They itch and burn. I don't know if this is continued from the rash that is on the back of my neck that I've seen you a few times for or not but it looks different. I started the new medication for the rash on my neck and have taken it for 8 days. My apt with the dermatologist is not until Aug 15. I don't know what to do.

## 2024-09-30 ENCOUNTER — PATIENT MESSAGE (OUTPATIENT)
Dept: FAMILY MEDICINE CLINIC | Age: 44
End: 2024-09-30

## 2024-09-30 DIAGNOSIS — E66.01 MORBID OBESITY WITH BMI OF 40.0-44.9, ADULT: ICD-10-CM

## 2024-10-01 ENCOUNTER — APPOINTMENT (OUTPATIENT)
Dept: GENERAL RADIOLOGY | Age: 44
End: 2024-10-01
Payer: COMMERCIAL

## 2024-10-01 ENCOUNTER — HOSPITAL ENCOUNTER (EMERGENCY)
Age: 44
Discharge: HOME | End: 2024-10-01
Payer: COMMERCIAL

## 2024-10-01 VITALS
TEMPERATURE: 97.5 F | SYSTOLIC BLOOD PRESSURE: 149 MMHG | HEART RATE: 74 BPM | RESPIRATION RATE: 18 BRPM | DIASTOLIC BLOOD PRESSURE: 95 MMHG | OXYGEN SATURATION: 98 %

## 2024-10-01 DIAGNOSIS — M79.671 RIGHT FOOT PAIN: Primary | ICD-10-CM

## 2024-10-01 PROCEDURE — 96372 THER/PROPH/DIAG INJ SC/IM: CPT

## 2024-10-01 PROCEDURE — 99213 OFFICE O/P EST LOW 20 MIN: CPT | Performed by: NURSE PRACTITIONER

## 2024-10-01 PROCEDURE — 73630 X-RAY EXAM OF FOOT: CPT

## 2024-10-01 PROCEDURE — 99213 OFFICE O/P EST LOW 20 MIN: CPT

## 2024-10-01 PROCEDURE — 6360000002 HC RX W HCPCS: Performed by: NURSE PRACTITIONER

## 2024-10-01 RX ORDER — MENTHOL 787.5 MG/1
PATCH TOPICAL
COMMUNITY
Start: 2024-10-01

## 2024-10-01 RX ORDER — ACETAMINOPHEN 500 MG
500 TABLET ORAL EVERY 6 HOURS PRN
COMMUNITY

## 2024-10-01 RX ORDER — HYDROXYZINE HYDROCHLORIDE 10 MG/1
TABLET, FILM COATED ORAL
COMMUNITY
Start: 2024-08-16

## 2024-10-01 RX ORDER — TIRZEPATIDE 7.5 MG/.5ML
7.5 INJECTION, SOLUTION SUBCUTANEOUS WEEKLY
Qty: 2 ML | Refills: 0 | Status: SHIPPED | OUTPATIENT
Start: 2024-10-01 | End: 2024-10-23

## 2024-10-01 RX ORDER — KETOROLAC TROMETHAMINE 30 MG/ML
60 INJECTION, SOLUTION INTRAMUSCULAR; INTRAVENOUS ONCE
Status: COMPLETED | OUTPATIENT
Start: 2024-10-01 | End: 2024-10-01

## 2024-10-01 RX ORDER — IBUPROFEN 400 MG/1
400 TABLET, FILM COATED ORAL EVERY 6 HOURS PRN
COMMUNITY

## 2024-10-01 RX ADMIN — KETOROLAC TROMETHAMINE 60 MG: 60 INJECTION, SOLUTION INTRAMUSCULAR at 18:13

## 2024-10-01 ASSESSMENT — PAIN SCALES - GENERAL: PAINLEVEL_OUTOF10: 8

## 2024-10-01 ASSESSMENT — PAIN DESCRIPTION - ORIENTATION: ORIENTATION: RIGHT;ANTERIOR

## 2024-10-01 ASSESSMENT — PAIN - FUNCTIONAL ASSESSMENT: PAIN_FUNCTIONAL_ASSESSMENT: 0-10

## 2024-10-01 ASSESSMENT — PAIN DESCRIPTION - DESCRIPTORS: DESCRIPTORS: SHARP;STABBING

## 2024-10-01 ASSESSMENT — PAIN DESCRIPTION - LOCATION: LOCATION: FOOT

## 2024-10-01 NOTE — DISCHARGE INSTRUCTIONS
Ice, elevation 15-20 minutes 3 times a day for the first 24-48 hours followed with heat 15-20 minutes 3 times a day thereafter.    Activity as tolerated.    Take medication as directed  Return for worsening symptoms, otherwise if symptoms persist follow up orthopedics in 1 to 3 days

## 2024-10-01 NOTE — TELEPHONE ENCOUNTER
Recent Visits  Date Type Provider Dept   07/29/24 Office Visit Richie Cha, DO [facility]   05/01/24 Office Visit Richie Cha, DO [facility]   08/01/23 Office Visit Richie Cha, DO [facility]   Showing recent visits within past 540 days with a meds authorizing provider and meeting all other requirements  Future Appointments  Date Type Provider Dept   10/29/24 Appointment Richie Cha, DO [facility]   Showing future appointments within next 150 days with a meds authorizing provider and meeting all other requirements

## 2024-10-01 NOTE — ED NOTES
Pt with complaints of right foot pain that started on Sunday. Denies any injury. States she has been taking tylenol and ibuprofen which has helped a little bit. States pain is when walking.     Lupe Quinteros LPN  10/01/24 9412

## 2024-10-05 ASSESSMENT — ENCOUNTER SYMPTOMS
WHEEZING: 0
STRIDOR: 0
COUGH: 0
CHEST TIGHTNESS: 0
CHOKING: 0
SHORTNESS OF BREATH: 0
APNEA: 0
BACK PAIN: 0
COLOR CHANGE: 0

## 2024-10-05 NOTE — ED PROVIDER NOTES
[facility] URGENT CARE  Urgent Care Encounter      CHIEF COMPLAINT       Chief Complaint   Patient presents with    Foot Pain     right       Nurses Notes reviewed and I agree except as noted in the HPI.  HISTORY OFPRESENT ILLNESS   Pili Brown is a 44 y.o.  The history is provided by the patient. No  was used.   Foot Problem  Location:  Foot  Time since incident:  2 weeks  Injury: no    Foot location:  [place] of R foot  Pain details:     Quality:  Aching    Radiates to:  Does not radiate    Severity:  Severe    Onset quality:  Gradual    Timing:  Constant    Progression:  Worsening  Chronicity:  New  Dislocation: no    Foreign body present:  No foreign bodies  Tetanus status:  Unknown  Prior injury to area:  No  Relieved by:  Nothing  Worsened by:  Bearing weight  Ineffective treatments:  NSAIDs  Associated symptoms: no back pain, no decreased ROM, no fatigue, no fever, no itching, no muscle weakness, no neck pain, no numbness, no stiffness, no swelling and no tingling    Risk factors: obesity    Risk factors: no concern for non-accidental trauma, no frequent fractures, no known bone disorder and no recent illness        REVIEW OF SYSTEMS     Review of Systems   Constitutional:  Negative for activity change, appetite change, chills, diaphoresis, fatigue and fever.   Respiratory:  Negative for apnea, cough, choking, chest tightness, shortness of breath, wheezing and stridor.    Musculoskeletal:  Positive for gait problem and myalgias. Negative for back pain, neck pain and stiffness.   Skin:  Negative for color change, itching, pallor, rash and wound.       PAST MEDICAL HISTORY         Diagnosis Date    Adrenal adenoma; bilateral. Stable and benign on CT     Hypertension, essential 11/11/2019    Hypothyroidism, postoperative     Morbid obesity     S/P partial thyroidectomy 11/11/2019       SURGICAL HISTORY     Patient  has a past surgical history that includes Total abdominal  used smokeless tobacco. She reports current alcohol use of about 1.0 - 2.0 standard drink of alcohol per week. She reports that she does not use drugs.    PHYSICAL EXAM     ED TRIAGE VITALS  BP: (!) 149/95, Temp: 97.5 °F (36.4 °C), Pulse: 74, Respirations: 18, SpO2: 98 %  Physical Exam  Vitals and nursing note reviewed.   Constitutional:       General: She is not in acute distress.     Appearance: Normal appearance. She is well-developed, well-groomed and overweight. She is not ill-appearing, toxic-appearing or diaphoretic.   HENT:      Head: Normocephalic and atraumatic.      Right Ear: External ear normal.      Left Ear: External ear normal.   Eyes:      Extraocular Movements: Extraocular movements intact.      Conjunctiva/sclera: Conjunctivae normal.   Cardiovascular:      Pulses: Normal pulses.           Dorsalis pedis pulses are 2+ on the right side.        Posterior tibial pulses are 2+ on the right side.   Pulmonary:      Effort: Pulmonary effort is normal.   Musculoskeletal:      Right foot: Normal range of motion. No deformity, bunion, Charcot foot, foot drop or prominent metatarsal heads.        Feet:    Feet:      Right foot:      Skin integrity: Skin integrity normal. No ulcer, blister, skin breakdown, erythema, warmth, callus, dry skin or fissure.      Toenail Condition: Right toenails are normal.   Neurological:      General: No focal deficit present.      Mental Status: She is alert and oriented to person, place, and time.         DIAGNOSTIC RESULTS   Labs:No results found for this visit on 10/01/24.    IMAGING:  XR FOOT RIGHT (MIN 3 VIEWS)   Final Result   Impression:   No fracture. Moderate degenerative change.      This document has been electronically signed by: Rosibel Aleman MD    on 10/01/2024 06:40 PM        URGENT CARE COURSE:     Vitals:    10/01/24 1719 10/01/24 1723 10/01/24 1837   BP:  (!) 146/93 (!) 149/95   Pulse: 74  74   Resp: 18  18   Temp: 97.5 °F (36.4 °C)     TempSrc:

## 2024-10-28 NOTE — PATIENT INSTRUCTIONS
LAB INSTRUCTIONS:    Please complete labs within 2 week(s).    Please fast for 8 hours prior to lab collection.    The clinic will call you within 1 week of collection. If you have not heard from us within that amount of time, please call us at [phone].

## 2024-10-28 NOTE — PROGRESS NOTES
Chief Complaint   Patient presents with    Follow-up     Chronic issues noted below     History obtained from the patient.    SUBJECTIVE:  Pili Brown is a 44 y.o.  that presents today for     -Obesity:  On Zepbound  Just finished up 7.5mg, ready to move to 10mg  Exercises daily  On calorie restricted diet  Has lost quite a bit of wt  Struggled to lose wt prior    Wt Readings from Last 3 Encounters:   10/29/24 121.6 kg (268 lb)   08/06/24 132.5 kg (292 lb)   07/29/24 133.8 kg (295 lb)       -IFG:  Losing wt as above  On Zepbound  Due for labs      -Hypothyroidism:     HPI:  S/p R thyroid lobectomy for large cyst Fall 2019 in [place], [place]     Currently treated for Hypothyroidism?  Yes  Fatigue?  No  Recent change in weight?  No  Cold/Heat intolerance?  No  Diarrhea/Constipation?  No  Diaphoresis?  No  Anxiety?  No  Palpitations?  No   Hair Loss?  No    Lab Results   Component Value Date    TSH 2.910 04/30/2024       -HTN:  Hx of  Weaned off Micardis APR 2023  BPs remain appropriate, <140/90  No CP/SOB      Age/Gender Health Maintenance    Lipid -   Lab Results   Component Value Date    CHOL 177 04/30/2024    CHOL 208 (H) 04/06/2023    CHOL 189 09/07/2021     Lab Results   Component Value Date    TRIG 110 04/30/2024    TRIG 130 04/06/2023    TRIG 117 09/07/2021     Lab Results   Component Value Date    HDL 48 04/30/2024    HDL 60 04/06/2023    HDL 45 09/07/2021     Lab Results   Component Value Date     04/30/2024     04/06/2023     09/07/2021       DM Screen -   Lab Results   Component Value Date/Time    GLUCOSE 109 04/30/2024 07:59 AM     Lab Results   Component Value Date/Time    LABA1C 5.9 04/30/2024 07:59 AM    LABA1C 5.9 04/06/2023 08:54 AM    LABA1C 5.6 10/21/2020 10:47 AM       TSH -   Lab Results   Component Value Date    TSH 2.910 04/30/2024       Colon Cancer Screening - + Polyp July 2024, repeat 5 years per GI, [place]  Lung Cancer Screening - never smoker.     Tetanus - UTD MAY

## 2024-10-29 ENCOUNTER — OFFICE VISIT (OUTPATIENT)
Dept: FAMILY MEDICINE CLINIC | Age: 44
End: 2024-10-29
Payer: COMMERCIAL

## 2024-10-29 VITALS
WEIGHT: 268 LBS | OXYGEN SATURATION: 100 % | SYSTOLIC BLOOD PRESSURE: 130 MMHG | HEART RATE: 76 BPM | HEIGHT: 70 IN | RESPIRATION RATE: 18 BRPM | TEMPERATURE: 97 F | DIASTOLIC BLOOD PRESSURE: 74 MMHG | BODY MASS INDEX: 38.37 KG/M2

## 2024-10-29 DIAGNOSIS — R73.01 IMPAIRED FASTING GLUCOSE: ICD-10-CM

## 2024-10-29 DIAGNOSIS — E66.9 OBESITY (BMI 30-39.9): Primary | ICD-10-CM

## 2024-10-29 DIAGNOSIS — Z86.79 HISTORY OF HYPERTENSION: ICD-10-CM

## 2024-10-29 DIAGNOSIS — E89.0 POSTOPERATIVE HYPOTHYROIDISM: Chronic | ICD-10-CM

## 2024-10-29 DIAGNOSIS — E89.0 S/P PARTIAL THYROIDECTOMY: Chronic | ICD-10-CM

## 2024-10-29 PROCEDURE — 3075F SYST BP GE 130 - 139MM HG: CPT | Performed by: FAMILY MEDICINE

## 2024-10-29 PROCEDURE — 3078F DIAST BP <80 MM HG: CPT | Performed by: FAMILY MEDICINE

## 2024-10-29 PROCEDURE — 99214 OFFICE O/P EST MOD 30 MIN: CPT | Performed by: FAMILY MEDICINE

## 2024-10-29 RX ORDER — TIRZEPATIDE 10 MG/.5ML
10 INJECTION, SOLUTION SUBCUTANEOUS WEEKLY
Qty: 6 ML | Refills: 2 | Status: SHIPPED | OUTPATIENT
Start: 2024-10-29

## 2025-01-20 DIAGNOSIS — G89.29 CHRONIC RIGHT-SIDED LOW BACK PAIN WITH RIGHT-SIDED SCIATICA: ICD-10-CM

## 2025-01-20 DIAGNOSIS — M51.369 DEGENERATIVE DISC DISEASE, LUMBAR: ICD-10-CM

## 2025-01-20 DIAGNOSIS — M54.41 CHRONIC RIGHT-SIDED LOW BACK PAIN WITH RIGHT-SIDED SCIATICA: ICD-10-CM

## 2025-01-20 RX ORDER — MELOXICAM 15 MG/1
TABLET ORAL
Qty: 30 TABLET | Refills: 5 | Status: SHIPPED | OUTPATIENT
Start: 2025-01-20

## 2025-01-20 NOTE — TELEPHONE ENCOUNTER
Recent Visits  Date Type Provider Dept   10/29/24 Office Visit Richie Cha, DO [facility]   07/29/24 Office Visit Richie Cha, DO [facility]   05/01/24 Office Visit Richie Cha, DO [facility]   08/01/23 Office Visit Richie Cha, DO [facility]   Showing recent visits within past 540 days with a meds authorizing provider and meeting all other requirements  Future Appointments  Date Type Provider Dept   04/29/25 Appointment Richie Cha, DO [facility]   Showing future appointments within next 150 days with a meds authorizing provider and meeting all other requirements

## 2025-02-10 ENCOUNTER — APPOINTMENT (OUTPATIENT)
Dept: GENERAL RADIOLOGY | Age: 45
End: 2025-02-10
Payer: COMMERCIAL

## 2025-02-10 ENCOUNTER — HOSPITAL ENCOUNTER (EMERGENCY)
Age: 45
Discharge: HOME | End: 2025-02-10
Payer: COMMERCIAL

## 2025-02-10 VITALS
TEMPERATURE: 97.6 F | BODY MASS INDEX: 35.07 KG/M2 | DIASTOLIC BLOOD PRESSURE: 97 MMHG | RESPIRATION RATE: 20 BRPM | SYSTOLIC BLOOD PRESSURE: 156 MMHG | OXYGEN SATURATION: 100 % | WEIGHT: 245 LBS | HEART RATE: 79 BPM | HEIGHT: 70 IN

## 2025-02-10 DIAGNOSIS — S29.9XXA RIB INJURY: Primary | ICD-10-CM

## 2025-02-10 PROCEDURE — 99213 OFFICE O/P EST LOW 20 MIN: CPT

## 2025-02-10 PROCEDURE — 71101 X-RAY EXAM UNILAT RIBS/CHEST: CPT

## 2025-02-10 ASSESSMENT — ENCOUNTER SYMPTOMS
TROUBLE SWALLOWING: 0
COUGH: 0
VOMITING: 0
RHINORRHEA: 0
WHEEZING: 0
ABDOMINAL PAIN: 0
SINUS PAIN: 0
NAUSEA: 0
SORE THROAT: 0
SHORTNESS OF BREATH: 0
APNEA: 0
BACK PAIN: 0
PHOTOPHOBIA: 0
CONSTIPATION: 0
EYE PAIN: 0
CHEST TIGHTNESS: 0
DIARRHEA: 0

## 2025-02-10 ASSESSMENT — PAIN DESCRIPTION - DESCRIPTORS: DESCRIPTORS: SHARP

## 2025-02-10 ASSESSMENT — PAIN SCALES - GENERAL: PAINLEVEL_OUTOF10: 10

## 2025-02-10 ASSESSMENT — PAIN DESCRIPTION - LOCATION: LOCATION: RIB CAGE

## 2025-02-10 ASSESSMENT — PAIN - FUNCTIONAL ASSESSMENT: PAIN_FUNCTIONAL_ASSESSMENT: 0-10

## 2025-02-10 ASSESSMENT — PAIN DESCRIPTION - ORIENTATION: ORIENTATION: LEFT

## 2025-02-10 NOTE — ED PROVIDER NOTES
[facility] URGENT CARE  Urgent Care Encounter       CHIEF COMPLAINT       Chief Complaint   Patient presents with    Rib Injury       Nurses Notes reviewed and I agree except as noted in the HPI.  HISTORY OF PRESENT ILLNESS   Pili Brown is a 44 y.o. female who presents to [facility] urgent care for evaluation of rib injury.  Patient reporting that she was \"trying self-defense with a coworker yesterday\" and she \"felt a pop.  In the left ribs.  Patient reports she has tried Tylenol which have provided some relief.  Patient reporting that she takes a daily Mobic.  Patient reporting that she did continue with the defense maneuvers after the popping sensation and pain started.    The history is provided by the patient. No  was used.       REVIEW OF SYSTEMS     Review of Systems   Constitutional:  Negative for activity change, appetite change, chills, fatigue, fever and unexpected weight change.   HENT:  Negative for congestion, ear pain, hearing loss, mouth sores, nosebleeds, rhinorrhea, sinus pain, sneezing, sore throat, tinnitus and trouble swallowing.    Eyes:  Negative for photophobia, pain and visual disturbance.   Respiratory:  Negative for apnea, cough, chest tightness, shortness of breath and wheezing.    Cardiovascular:  Negative for chest pain and palpitations.   Gastrointestinal:  Negative for abdominal pain, constipation, diarrhea, nausea and vomiting.   Endocrine: Negative for cold intolerance, heat intolerance, polydipsia, polyphagia and polyuria.   Genitourinary:  Negative for difficulty urinating, dysuria, flank pain, frequency, hematuria, menstrual problem and urgency.   Musculoskeletal:  Positive for arthralgias. Negative for back pain, gait problem, joint swelling, neck pain and neck stiffness.   Skin:  Negative for rash and wound.   Neurological:  Negative for dizziness, tremors, seizures, speech difficulty, weakness, numbness and headaches.   Psychiatric/Behavioral:   regular rhythm.      Pulses: Normal pulses.      Heart sounds: Normal heart sounds.   Pulmonary:      Effort: Pulmonary effort is normal.      Breath sounds: Normal breath sounds.   Abdominal:      General: Bowel sounds are normal.      Palpations: Abdomen is soft.      Tenderness: There is no abdominal tenderness. There is no right CVA tenderness, left CVA tenderness or rebound.   Musculoskeletal:         General: Normal range of motion.        Arms:       Cervical back: Normal range of motion.      Comments: Trigger points to the area indicated above.  Pain elicited with movement of the left arm and stretching.   Skin:     General: Skin is warm and dry.   Neurological:      Mental Status: She is alert and oriented to person, place, and time. Mental status is at baseline.   Psychiatric:         Mood and Affect: Mood normal.         Behavior: Behavior normal.         Thought Content: Thought content normal.         Judgment: Judgment normal.         DIAGNOSTIC RESULTS     Labs:No results found for this visit on 02/10/25.    IMAGING:    XR RIBS LEFT INCLUDE CHEST (MIN 3 VIEWS)   Final Result   1. No acute bony abnormality            **This report has been created using voice recognition software.  It may contain   minor errors which are inherent in voice recognition technology.**      Electronically signed by Dr. Raquel Brownlee            EKG:      URGENT CARE COURSE:     Vitals:    02/10/25 0849   BP: (!) 156/97   Pulse: 79   Resp: 20   Temp: 97.6 °F (36.4 °C)   TempSrc: Temporal   SpO2: 100%   Weight: 111.1 kg (245 lb)   Height: 1.778 m (5' 10\")       Medications - No data to display         PROCEDURES:  None    FINAL IMPRESSION      1. Rib injury          DISPOSITION/ PLAN     Pili is a 44-year-old female pt to [facility] urgent care for evaluation of rib injury.  Upon assessment, patient resting comfortably on cot with easy respirations.  No acute/obvious distress observed at this time.  I agree with physical

## 2025-02-10 NOTE — ED NOTES
Pt with complaints of a left sided rib injury that occurred yesterday. States she was trying self defense with a coworker when she felt a pop in the left ribs. States she has tried tylenol and ibuprofen which helped with pain.     Lupe Quinteros LPN  02/10/25 0827

## 2025-02-10 NOTE — DISCHARGE INSTRUCTIONS
Your x-ray is negative for any acute fracture.    It is important that you take deep breaths and allow yourself to cough.  This will help prevent pneumonia.    There is not a lot to do for a bruised rib.  Keep using your Tylenol for pain.  The Mobic should help as well.  We can use warm and cold compresses alternating it.    Allow yourself time to heal.  However try doing stretches as well, as your symptoms start to improve.

## 2025-04-02 ENCOUNTER — TELEPHONE (OUTPATIENT)
Dept: FAMILY MEDICINE CLINIC | Age: 45
End: 2025-04-02

## 2025-04-02 DIAGNOSIS — R73.9 HYPERGLYCEMIA: ICD-10-CM

## 2025-04-02 DIAGNOSIS — I10 HYPERTENSION, ESSENTIAL: Primary | Chronic | ICD-10-CM

## 2025-04-03 NOTE — TELEPHONE ENCOUNTER
Pt due for fasting labs prior to next apt on 4/29/2025. Please call to have pt complete this. Thanks!    ASSESSMENT & PLAN   Diagnosis Orders   1. Hypertension, essential  CBC with Auto Differential    Comprehensive Metabolic Panel    Lipid Panel    TSH reflex to FT4    Albumin/Creatinine Ratio, Urine      2. Hyperglycemia  Hemoglobin A1C        Future Appointments   Date Time Provider Department Center   4/29/2025  8:00 AM Richie Cha, DO [facility]

## 2025-04-10 ENCOUNTER — HOSPITAL ENCOUNTER (OUTPATIENT)
Age: 45
Discharge: HOME | End: 2025-04-10
Payer: COMMERCIAL

## 2025-04-10 ENCOUNTER — RESULTS FOLLOW-UP (OUTPATIENT)
Dept: FAMILY MEDICINE CLINIC | Age: 45
End: 2025-04-10

## 2025-04-10 DIAGNOSIS — R73.9 HYPERGLYCEMIA: ICD-10-CM

## 2025-04-10 DIAGNOSIS — I10 HYPERTENSION, ESSENTIAL: Chronic | ICD-10-CM

## 2025-04-10 LAB
ALBUMIN SERPL BCG-MCNC: 4.1 G/DL (ref 3.4–4.9)
ALP SERPL-CCNC: 95 U/L (ref 38–126)
ALT SERPL W/O P-5'-P-CCNC: 17 U/L (ref 10–35)
ANION GAP SERPL CALC-SCNC: 12 MEQ/L (ref 8–16)
AST SERPL-CCNC: 21 U/L (ref 10–35)
BASOPHILS ABSOLUTE: 0 THOU/MM3 (ref 0–0.1)
BASOPHILS NFR BLD AUTO: 0.7 %
BILIRUB SERPL-MCNC: 0.4 MG/DL (ref 0.3–1.2)
BUN SERPL-MCNC: 18 MG/DL (ref 8–23)
CALCIUM SERPL-MCNC: 9.7 MG/DL (ref 8.6–10)
CHLORIDE SERPL-SCNC: 106 MEQ/L (ref 98–111)
CHOLEST SERPL-MCNC: 188 MG/DL (ref 100–199)
CO2 SERPL-SCNC: 22 MEQ/L (ref 22–29)
CREAT SERPL-MCNC: 0.9 MG/DL (ref 0.5–0.9)
CREAT UR-MCNC: 260 MG/DL
DEPRECATED MEAN GLUCOSE BLD GHB EST-ACNC: 96 MG/DL (ref 70–126)
DEPRECATED RDW RBC AUTO: 43.1 FL (ref 35–45)
EOSINOPHIL NFR BLD AUTO: 1.8 %
EOSINOPHILS ABSOLUTE: 0.1 THOU/MM3 (ref 0–0.4)
ERYTHROCYTE [DISTWIDTH] IN BLOOD BY AUTOMATED COUNT: 13.6 % (ref 11.5–14.5)
GFR SERPL CREATININE-BSD FRML MDRD: 79 ML/MIN/1.73M2
GLUCOSE SERPL-MCNC: 89 MG/DL (ref 74–109)
HBA1C MFR BLD HPLC: 5.2 % (ref 4–6)
HCT VFR BLD AUTO: 45.3 % (ref 37–47)
HDLC SERPL-MCNC: 51 MG/DL
HGB BLD-MCNC: 14.8 GM/DL (ref 12–16)
IMM GRANULOCYTES # BLD AUTO: 0.02 THOU/MM3 (ref 0–0.07)
IMM GRANULOCYTES NFR BLD AUTO: 0.3 %
LDLC SERPL CALC-MCNC: 118 MG/DL
LYMPHOCYTES ABSOLUTE: 2.1 THOU/MM3 (ref 1–4.8)
LYMPHOCYTES NFR BLD AUTO: 31.8 %
MCH RBC QN AUTO: 28.3 PG (ref 26–33)
MCHC RBC AUTO-ENTMCNC: 32.7 GM/DL (ref 32.2–35.5)
MCV RBC AUTO: 86.6 FL (ref 81–99)
MICROALBUMIN UR-MCNC: < 2 MG/DL
MICROALBUMIN/CREAT RATIO PNL UR: 8 MG/G (ref 0–30)
MONOCYTES ABSOLUTE: 0.3 THOU/MM3 (ref 0.4–1.3)
MONOCYTES NFR BLD AUTO: 5.1 %
NEUTROPHILS ABSOLUTE: 4 THOU/MM3 (ref 1.8–7.7)
NEUTROPHILS NFR BLD AUTO: 60.3 %
NRBC BLD AUTO-RTO: 0 /100 WBC
PLATELET # BLD AUTO: 230 THOU/MM3 (ref 130–400)
PMV BLD AUTO: 10.9 FL (ref 9.4–12.4)
POTASSIUM SERPL-SCNC: 4.1 MEQ/L (ref 3.5–5.2)
PROT SERPL-MCNC: 7.2 G/DL (ref 6.4–8.3)
RBC # BLD AUTO: 5.23 MILL/MM3 (ref 4.2–5.4)
SODIUM SERPL-SCNC: 140 MEQ/L (ref 135–145)
TRIGL SERPL-MCNC: 94 MG/DL (ref 0–199)
TSH SERPL DL<=0.05 MIU/L-ACNC: 1.62 UIU/ML (ref 0.27–4.2)
WBC # BLD AUTO: 6.7 THOU/MM3 (ref 4.8–10.8)

## 2025-04-10 PROCEDURE — 85025 COMPLETE CBC W/AUTO DIFF WBC: CPT

## 2025-04-10 PROCEDURE — 80053 COMPREHEN METABOLIC PANEL: CPT

## 2025-04-10 PROCEDURE — 80061 LIPID PANEL: CPT

## 2025-04-10 PROCEDURE — 84443 ASSAY THYROID STIM HORMONE: CPT

## 2025-04-10 PROCEDURE — 82043 UR ALBUMIN QUANTITATIVE: CPT

## 2025-04-10 PROCEDURE — 36415 COLL VENOUS BLD VENIPUNCTURE: CPT

## 2025-04-10 PROCEDURE — 83036 HEMOGLOBIN GLYCOSYLATED A1C: CPT

## 2025-04-18 ENCOUNTER — TELEPHONE (OUTPATIENT)
Dept: FAMILY MEDICINE CLINIC | Age: 45
End: 2025-04-18

## 2025-04-18 NOTE — TELEPHONE ENCOUNTER
Zepbound was denied.    Case number- [id number]      [id number] 04/17/2025 Pili Brown [address] [id number] Case number: [id number] Prescriber name: Richie Cha Prescriber fax: [phone] NOTICE OF DENIAL OFPRIOR AUTHORIZATION THIS DOCUMENTCONTAINSIMPORTANTINFORMATION THAT YOUSHOULDRETAINFORYOURRECORDS This document serves as notice of an Adverse Benefit Determination. An Adverse Benefit Determination is a decision we make not to provide benefits because we believe they are not medically necessary, you are not eligible for this benefit or the benefit is not covered under your plan. It can also be a decision to deny health benefit plan coverage or to rescind coverage. We will not provide benefits for the reason indicated below. If you think this determination was made in error, you have the right to appeal (see the “Important Information About Your Appeal Rights” section of this notice).     Dear Pili Brown, On behalf of [facility], [employer] is responsible for reviewing pharmacy services provided to [facility]. On 04/17/2025, we received a request from your prescriber for coverage of Zepbound Inj 12.5/0.5. Wehave declined to provide benefits, in whole or in part, for the requested treatment or service coverage for Zepbound. Whywasmyrequest denied? This request was denied because you did not meet the following clinical requirements: Based on the information provided, you do not meet the established medicationspecific criteria or guidelines for Zepbound at this time.   Per your health plan's criteria, this drug is covered if you meet the following: (1) Your baseline and current weight and body fat amount (body mass index) are provided. (2) One of the following: (A) Both of the following: (I) You have received up to six months of treatment. Page 1 of 14(II) You have had a weight loss of 5% or more of your baseline body weight, as confirmed by your current weight and baseline weight. (B)

## 2025-04-22 NOTE — TELEPHONE ENCOUNTER
Left detailed message about denial and appeal being faxed. Okay per HIPAA. Requested call back at [phone]  if they have any further questions.

## 2025-04-23 NOTE — TELEPHONE ENCOUNTER
Received fax from [employer], decision was overturned. Zepbound is approved for 6 months, through 10.22.2025.    Approval Scanned into patient chart

## 2025-04-23 NOTE — TELEPHONE ENCOUNTER
Left detailed message about Zepbound approval. [place] per HIPAA. Requested call back at [phone]  if they have any further questions.

## 2025-04-29 PROBLEM — E66.9 OBESITY (BMI 30-39.9): Status: RESOLVED | Noted: 2025-04-29 | Resolved: 2025-04-29

## 2025-04-29 PROBLEM — E66.9 OBESITY (BMI 30-39.9): Status: ACTIVE | Noted: 2025-04-29

## 2025-04-29 NOTE — PROGRESS NOTES
gallops, distal pulses intact.  Abdomen: soft, non-tender, non-distended, bowel sounds physiologic,  no rebound or guarding, no masses or hernias noted. Liver and spleen without enlargement.   Extremities: no cyanosis, clubbing or edema of the lower extremities.   Skin: warm and dry, no rash or erythema      ASSESSMENT & PLAN  1. Obesity (BMI 30-39.9)    Doing great  Con't Zepbound, titrate to 15mg  Con't exercise/diet changes  F/u 6 months    2. Impaired fasting glucose    Improved  Con't wt loss/Zepbound    3. Hypothyroidism, postoperative    Stable  Con't synthroid    4. S/P partial thyroidectomy      5. History of hypertension    In remission, off medications  Resume Micardis if need be in the future.    6. Encounter for screening mammogram for malignant neoplasm of breast    - TERESO KEANU DIGITAL SCREEN BILATERAL; Future  - MRI BREAST BILATERAL W WO CONTRAST; Future    7. Breast cancer screening, high risk patient    - TERESO KEANU DIGITAL SCREEN BILATERAL; Future  - MRI BREAST BILATERAL W WO CONTRAST; Future    8. Family history of breast cancer    - TERESO KEANU DIGITAL SCREEN BILATERAL; Future  - MRI BREAST BILATERAL W WO CONTRAST; Future      DISPOSITION    Return in about 6 months (around 10/30/2025) for follow-up on chronic medical conditions, sooner as needed.    Pili released without restrictions.    PATIENT COUNSELING    Barriers to learning and self management: none    Discussed use, benefit, and side effects of prescribed medications.  Barriers to medication compliance addressed.  All patient questions answered.  Pt voiced understanding.       Electronically signed by Richie Cha DO on 4/30/2025 at 7:52 AM

## 2025-04-30 ENCOUNTER — OFFICE VISIT (OUTPATIENT)
Dept: FAMILY MEDICINE CLINIC | Age: 45
End: 2025-04-30
Payer: COMMERCIAL

## 2025-04-30 VITALS
SYSTOLIC BLOOD PRESSURE: 130 MMHG | RESPIRATION RATE: 16 BRPM | HEART RATE: 77 BPM | BODY MASS INDEX: 33.58 KG/M2 | HEIGHT: 70 IN | OXYGEN SATURATION: 99 % | WEIGHT: 234.6 LBS | DIASTOLIC BLOOD PRESSURE: 80 MMHG | TEMPERATURE: 97.7 F

## 2025-04-30 DIAGNOSIS — E89.0 POSTOPERATIVE HYPOTHYROIDISM: Chronic | ICD-10-CM

## 2025-04-30 DIAGNOSIS — Z12.39 BREAST CANCER SCREENING, HIGH RISK PATIENT: ICD-10-CM

## 2025-04-30 DIAGNOSIS — E66.9 OBESITY (BMI 30-39.9): Primary | ICD-10-CM

## 2025-04-30 DIAGNOSIS — Z80.3 FAMILY HISTORY OF BREAST CANCER: ICD-10-CM

## 2025-04-30 DIAGNOSIS — Z12.31 ENCOUNTER FOR SCREENING MAMMOGRAM FOR MALIGNANT NEOPLASM OF BREAST: ICD-10-CM

## 2025-04-30 DIAGNOSIS — Z86.79 HISTORY OF HYPERTENSION: ICD-10-CM

## 2025-04-30 DIAGNOSIS — R73.01 IMPAIRED FASTING GLUCOSE: ICD-10-CM

## 2025-04-30 DIAGNOSIS — E89.0 S/P PARTIAL THYROIDECTOMY: Chronic | ICD-10-CM

## 2025-04-30 PROCEDURE — 3075F SYST BP GE 130 - 139MM HG: CPT | Performed by: FAMILY MEDICINE

## 2025-04-30 PROCEDURE — 3079F DIAST BP 80-89 MM HG: CPT | Performed by: FAMILY MEDICINE

## 2025-04-30 PROCEDURE — 99214 OFFICE O/P EST MOD 30 MIN: CPT | Performed by: FAMILY MEDICINE

## 2025-04-30 SDOH — ECONOMIC STABILITY: FOOD INSECURITY: WITHIN THE PAST 12 MONTHS, YOU WORRIED THAT YOUR FOOD WOULD RUN OUT BEFORE YOU GOT MONEY TO BUY MORE.: NEVER TRUE

## 2025-04-30 SDOH — ECONOMIC STABILITY: FOOD INSECURITY: WITHIN THE PAST 12 MONTHS, THE FOOD YOU BOUGHT JUST DIDN'T LAST AND YOU DIDN'T HAVE MONEY TO GET MORE.: NEVER TRUE

## 2025-04-30 ASSESSMENT — PATIENT HEALTH QUESTIONNAIRE - PHQ9
SUM OF ALL RESPONSES TO PHQ QUESTIONS 1-9: 0
1. LITTLE INTEREST OR PLEASURE IN DOING THINGS: NOT AT ALL
2. FEELING DOWN, DEPRESSED OR HOPELESS: NOT AT ALL
SUM OF ALL RESPONSES TO PHQ QUESTIONS 1-9: 0

## 2025-07-07 DIAGNOSIS — M54.41 CHRONIC RIGHT-SIDED LOW BACK PAIN WITH RIGHT-SIDED SCIATICA: ICD-10-CM

## 2025-07-07 DIAGNOSIS — G89.29 CHRONIC RIGHT-SIDED LOW BACK PAIN WITH RIGHT-SIDED SCIATICA: ICD-10-CM

## 2025-07-07 DIAGNOSIS — M51.369 DEGENERATIVE DISC DISEASE, LUMBAR: ICD-10-CM

## 2025-07-07 RX ORDER — MELOXICAM 15 MG/1
15 TABLET ORAL PRN
Qty: 30 TABLET | Refills: 5 | Status: SHIPPED | OUTPATIENT
Start: 2025-07-07

## 2025-07-07 NOTE — TELEPHONE ENCOUNTER
Recent Visits  Date Type Provider Dept   04/30/25 Office Visit Richie Cha, DO [facility]   10/29/24 Office Visit Richie Cha, DO [facility]   07/29/24 Office Visit Richie Cha, DO [facility]   05/01/24 Office Visit Richie Cha, DO [facility]   Showing recent visits within past 540 days with a meds authorizing provider and meeting all other requirements  Future Appointments  Date Type Provider Dept   10/29/25 Appointment Richie Cha, DO [facility]   Showing future appointments within next 150 days with a meds authorizing provider and meeting all other requirements

## 2025-08-06 DIAGNOSIS — L30.9 DERMATITIS: ICD-10-CM

## 2025-08-06 DIAGNOSIS — G89.29 CHRONIC RIGHT-SIDED LOW BACK PAIN WITH RIGHT-SIDED SCIATICA: ICD-10-CM

## 2025-08-06 DIAGNOSIS — E89.0 POSTOPERATIVE HYPOTHYROIDISM: ICD-10-CM

## 2025-08-06 DIAGNOSIS — M54.41 CHRONIC RIGHT-SIDED LOW BACK PAIN WITH RIGHT-SIDED SCIATICA: ICD-10-CM

## 2025-08-06 DIAGNOSIS — M51.369 DEGENERATIVE DISC DISEASE, LUMBAR: ICD-10-CM

## 2025-08-06 RX ORDER — MELOXICAM 15 MG/1
15 TABLET ORAL PRN
Qty: 30 TABLET | Refills: 5 | Status: SHIPPED | OUTPATIENT
Start: 2025-08-06

## 2025-08-06 RX ORDER — TRIAMCINOLONE ACETONIDE 1 MG/G
CREAM TOPICAL
Qty: 45 G | Refills: 0 | Status: SHIPPED | OUTPATIENT
Start: 2025-08-06

## 2025-08-06 RX ORDER — LEVOTHYROXINE SODIUM 25 UG/1
25 TABLET ORAL DAILY
Qty: 90 TABLET | Refills: 3 | Status: SHIPPED | OUTPATIENT
Start: 2025-08-06

## 2025-08-16 ENCOUNTER — HOSPITAL ENCOUNTER (EMERGENCY)
Age: 45
Discharge: HOME | End: 2025-08-16
Payer: COMMERCIAL

## 2025-08-16 VITALS
DIASTOLIC BLOOD PRESSURE: 110 MMHG | RESPIRATION RATE: 18 BRPM | SYSTOLIC BLOOD PRESSURE: 173 MMHG | HEART RATE: 80 BPM | TEMPERATURE: 97.9 F | OXYGEN SATURATION: 99 %

## 2025-08-16 DIAGNOSIS — H60.391 INFECTIVE OTITIS EXTERNA OF RIGHT EAR: Primary | ICD-10-CM

## 2025-08-16 DIAGNOSIS — H66.001 NON-RECURRENT ACUTE SUPPURATIVE OTITIS MEDIA OF RIGHT EAR WITHOUT SPONTANEOUS RUPTURE OF TYMPANIC MEMBRANE: ICD-10-CM

## 2025-08-16 PROCEDURE — 99213 OFFICE O/P EST LOW 20 MIN: CPT

## 2025-08-16 RX ORDER — AMOXICILLIN 875 MG/1
875 TABLET, COATED ORAL 2 TIMES DAILY
Qty: 10 TABLET | Refills: 0 | Status: SHIPPED | OUTPATIENT
Start: 2025-08-16 | End: 2025-08-21

## 2025-08-16 RX ORDER — CIPROFLOXACIN AND DEXAMETHASONE 3; 1 MG/ML; MG/ML
4 SUSPENSION/ DROPS AURICULAR (OTIC) 2 TIMES DAILY
Qty: 2.9 ML | Refills: 0 | Status: SHIPPED | OUTPATIENT
Start: 2025-08-16 | End: 2025-08-23

## 2025-08-16 ASSESSMENT — PAIN SCALES - GENERAL: PAINLEVEL_OUTOF10: 6

## 2025-08-16 ASSESSMENT — PAIN DESCRIPTION - LOCATION: LOCATION: EAR

## (undated) DEVICE — 3M™ WARMING BLANKET, UPPER BODY, 10 PER CASE, 42268: Brand: BAIR HUGGER™

## (undated) DEVICE — CORE TRUMPET FOR SINGLE SOLUTION BAG: Brand: CORE DYNAMICS

## (undated) DEVICE — EXCEL 10FT (3.05 M) INSUFFLATION TUBING SET WITH 0.1 MICRON FILTER: Brand: EXCEL

## (undated) DEVICE — INSUFFLATION NEEDLE TO ESTABLISH PNEUMOPERITONEUM.: Brand: INSUFFLATION NEEDLE

## (undated) DEVICE — UNIVERSAL MONOPOLAR LAPAROSCOPIC CABLE 10FT, 4MM PIN CONNECTOR: Brand: CONMED

## (undated) DEVICE — TROCAR: Brand: KII SHIELDED BLADED ACCESS SYSTEM